# Patient Record
Sex: FEMALE | Race: BLACK OR AFRICAN AMERICAN | Employment: OTHER | URBAN - METROPOLITAN AREA
[De-identification: names, ages, dates, MRNs, and addresses within clinical notes are randomized per-mention and may not be internally consistent; named-entity substitution may affect disease eponyms.]

---

## 2017-01-06 RX ORDER — OMEPRAZOLE 20 MG/1
CAPSULE, DELAYED RELEASE ORAL
Qty: 90 CAP | Refills: 12 | Status: SHIPPED | OUTPATIENT
Start: 2017-01-06 | End: 2019-01-08 | Stop reason: ALTCHOICE

## 2017-01-30 RX ORDER — ATORVASTATIN CALCIUM 20 MG/1
20 TABLET, FILM COATED ORAL DAILY
Qty: 90 TAB | Refills: 3 | Status: SHIPPED | OUTPATIENT
Start: 2017-01-30 | End: 2017-12-18 | Stop reason: SDUPTHER

## 2017-06-06 ENCOUNTER — OFFICE VISIT (OUTPATIENT)
Dept: FAMILY MEDICINE CLINIC | Age: 72
End: 2017-06-06

## 2017-06-06 VITALS
HEART RATE: 62 BPM | SYSTOLIC BLOOD PRESSURE: 132 MMHG | RESPIRATION RATE: 14 BRPM | DIASTOLIC BLOOD PRESSURE: 62 MMHG | BODY MASS INDEX: 20.43 KG/M2 | TEMPERATURE: 96.2 F | HEIGHT: 65 IN | WEIGHT: 122.6 LBS | OXYGEN SATURATION: 100 %

## 2017-06-06 DIAGNOSIS — M79.10 MYALGIA: ICD-10-CM

## 2017-06-06 DIAGNOSIS — E78.00 HYPERCHOLESTEROLEMIA: Primary | ICD-10-CM

## 2017-06-06 DIAGNOSIS — R13.19 ESOPHAGEAL DYSPHAGIA: ICD-10-CM

## 2017-06-06 RX ORDER — DICLOFENAC SODIUM 50 MG/1
TABLET, DELAYED RELEASE ORAL
Qty: 60 TAB | Refills: 5 | Status: SHIPPED | OUTPATIENT
Start: 2017-06-06 | End: 2017-08-25 | Stop reason: CLARIF

## 2017-06-06 NOTE — PROGRESS NOTES
HISTORY OF PRESENT ILLNESS  Lucy Palumbo is a 66 y.o. female. HPI In for cholesterol check. No complaints of chest pain, shortness of breath, TIAs, claudication or edema. Some pain and swelling in knees, Left greater than Right. Thinks that cholesterol pills make pain worse. Takes Tylenol prn- some relief. Has problems with food getting hung up in throat when swallows. Has had this problem for 50 years. Occasional vomiting. No weight loss. ROS    Physical Exam   Constitutional: She is oriented to person, place, and time. She appears well-developed and well-nourished. Neck: No thyromegaly present. Cardiovascular: Normal rate, regular rhythm and normal heart sounds. No murmur heard. Pulmonary/Chest: Effort normal and breath sounds normal. She has no wheezes. BR- without mass   Abdominal: Soft. Bowel sounds are normal. She exhibits no distension. There is no tenderness. There is no rebound and no guarding. Musculoskeletal: Normal range of motion. She exhibits no edema. Knees- moderate crepitus bilaterally   Lymphadenopathy:     She has no cervical adenopathy. Neurological: She is alert and oriented to person, place, and time. Nursing note and vitals reviewed. ASSESSMENT and PLAN  Orders Placed This Encounter    XR SWALLOW FUNC VIDEO    METABOLIC PANEL, COMPREHENSIVE    CBC WITH AUTOMATED DIFF    LIPID PANEL    CK    diclofenac EC (VOLTAREN) 50 mg EC tablet     Glory was seen today for cholesterol problem and gerd. Diagnoses and all orders for this visit:    Hypercholesterolemia  -     METABOLIC PANEL, COMPREHENSIVE  -     LIPID PANEL    Esophageal dysphagia  -     XR SWALLOW FUNC VIDEO; Future  -     CBC WITH AUTOMATED DIFF    Myalgia  -     CK    Other orders  -     diclofenac EC (VOLTAREN) 50 mg EC tablet; One every 12 hours as needed for knee pain      Follow-up Disposition:  Return in about 6 months (around 12/6/2017).

## 2017-06-06 NOTE — PROGRESS NOTES
Chief Complaint   Patient presents with    Cholesterol Problem    GERD     1. Have you been to the ER, urgent care clinic since your last visit? Hospitalized since your last visit? No    2. Have you seen or consulted any other health care providers outside of the [facility] since your last visit? Include any pap smears or colon screening.  No     Health Maintenance Due   Topic Date Due    GLAUCOMA SCREENING Q2Y  07/07/2008    OSTEOPOROSIS SCREENING (DEXA)  07/07/2008

## 2017-06-06 NOTE — MR AVS SNAPSHOT
Visit Information Date & Time Provider Department Dept. Phone Encounter #  
 6/6/2017  8:45 AM Tsering Pimentel MD [facility] [phone] [id number] Follow-up Instructions Return in about 6 months (around 12/6/2017). Upcoming Health Maintenance Date Due  
 GLAUCOMA SCREENING Q2Y 7/7/2008 OSTEOPOROSIS SCREENING (DEXA) 7/7/2008 INFLUENZA AGE 9 TO ADULT 8/1/2017 MEDICARE YEARLY EXAM 12/7/2017 BREAST CANCER SCRN MAMMOGRAM 1/8/2018 DTaP/Tdap/Td series (2 - Td) 1/28/2024 COLONOSCOPY 8/25/2026 Allergies as of 6/6/2017  Review Complete On: 6/6/2017 By: Tsering Pimentel MD  
 No Known Allergies Current Immunizations  Reviewed on 12/1/2015 Name Date Influenza High Dose Vaccine PF 12/6/2016, 12/1/2015, 12/1/2014 Influenza Vaccine PF 11/25/2013 Influenza Vaccine Split 11/30/2012, 10/28/2011, 10/19/2010 Pneumococcal Vaccine (Unspecified Type) 11/30/2012, 10/25/2007 Tdap 1/28/2014 Not reviewed this visit You Were Diagnosed With   
  
 Codes Comments Hypercholesterolemia    -  Primary ICD-10-CM: E78.00 ICD-9-CM: 272.0 Esophageal dysphagia     ICD-10-CM: R13.14 ICD-9-CM: 787.24 Myalgia     ICD-10-CM: M79.1 ICD-9-CM: 729.1 Vitals BP Pulse Temp Resp Height(growth percentile) Weight(growth percentile) 132/62 62 96.2 °F (35.7 °C) (Oral) 14 5' 5\" (1.651 m) 122 lb 9.6 oz (55.6 kg) SpO2 BMI OB Status Smoking Status 100% 20.4 kg/m2 Postmenopausal Current Some Day Smoker Vitals History BMI and BSA Data Body Mass Index Body Surface Area  
 20.4 kg/m 2 1.6 m 2 Preferred Pharmacy Pharmacy Name Phone [facility] [address] [place] [address]  [place] [phone] Your Updated Medication List  
  
   
This list is accurate as of: 6/6/17  9:22 AM.  Always use your most recent med list.  
  
  
  
  
 atorvastatin 20 mg tablet Commonly known as:  LIPITOR Take 1 Tab by mouth daily. For cholesterol  
  
 diclofenac EC 50 mg EC tablet Commonly known as:  VOLTAREN One every 12 hours as needed for knee pain  
  
 omeprazole 20 mg capsule Commonly known as:  PRILOSEC  
TAKE 1 CAPSULE BY MOUTH DAILY FOR INDEGESTION Prescriptions Sent to Pharmacy Refills  
 diclofenac EC (VOLTAREN) 50 mg EC tablet 5 Sig: One every 12 hours as needed for knee pain  
 Class: Normal  
 Pharmacy: [facility] [address] #: [phone] We Performed the Following CBC WITH AUTOMATED DIFF [39731 CPT(R)] CK [id number] CPT(R)] LIPID PANEL [98562 CPT(R)] METABOLIC PANEL, COMPREHENSIVE [09565 CPT(R)] Follow-up Instructions Return in about 6 months (around 12/6/2017). To-Do List   
 06/06/2017 Imaging:  XR SWALLOW FUNC VIDEO   
  
 06/14/2017 2:20 PM  
  Appointment with [facility] SRIDEVI 1 at [facility] ([phone]) Shower or bathe using soap and water. Do not use deodorant, powder, perfumes, or lotion the day of your exam.  If your prior mammograms were not performed at [facility] 6 please bring films with you or forward prior images 2 days before your procedure. Check in at registration 15min before your appointment time unless you were instructed to do otherwise. A script is not necessary, but if you have one, please bring it on the day of the mammogram or have it faxed to the department. [facility] [phone] [facility]  [phone] [facility] [address] [place]  [phone] [facility] [phone] [address] [phone] Introducing [facility]! [facility] introduces [employer] patient portal. Now you can access parts of your medical record, email your doctor's office, and request medication refills online.    
 
1. In your internet browser, go to [email or web address] 2. Click on the First Time User? Click Here link in the Sign In box. You will see the New Member Sign Up page. 3. Enter your [employer] Access Code exactly as it appears below. You will not need to use this code after youve completed the sign-up process. If you do not sign up before the expiration date, you must request a new code. · [employer] Access Code: [email or web address] Expires: 8/9/2017  2:48 PM 
 
4. Enter the last four digits of your Social Security Number (xxxx) and Date of Birth (mm/dd/yyyy) as indicated and click Submit. You will be taken to the next sign-up page. 5. Create a "[employer]"t ID. This will be your [employer] login ID and cannot be changed, so think of one that is secure and easy to remember. 6. Create a [employer] password. You can change your password at any time. 7. Enter your Password Reset Question and Answer. This can be used at a later time if you forget your password. 8. Enter your e-mail address. You will receive e-mail notification when new information is available in [address] 9. Click Sign Up. You can now view and download portions of your medical record. 10. Click the Download Summary menu link to download a portable copy of your medical information. If you have questions, please visit the Frequently Asked Questions section of the [employer] website. Remember, [employer] is NOT to be used for urgent needs. For medical emergencies, dial 911. Now available from your iPhone and Android! Please provide this summary of care documentation to your next provider. Your primary care clinician is listed as Gavin aJramillo. If you have any questions after today's visit, please call [phone].

## 2017-06-07 LAB
ALBUMIN SERPL-MCNC: 4.4 G/DL (ref 3.5–4.8)
ALBUMIN/GLOB SERPL: 1.3 {RATIO} (ref 1.2–2.2)
ALP SERPL-CCNC: 113 IU/L (ref 39–117)
ALT SERPL-CCNC: 15 IU/L (ref 0–32)
AST SERPL-CCNC: 19 IU/L (ref 0–40)
BASOPHILS # BLD AUTO: 0.1 X10E3/UL (ref 0–0.2)
BASOPHILS NFR BLD AUTO: 1 %
BILIRUB SERPL-MCNC: 0.2 MG/DL (ref 0–1.2)
BUN SERPL-MCNC: 10 MG/DL (ref 8–27)
BUN/CREAT SERPL: 11 (ref 12–28)
CALCIUM SERPL-MCNC: 9.5 MG/DL (ref 8.7–10.3)
CHLORIDE SERPL-SCNC: 100 MMOL/L (ref 96–106)
CHOLEST SERPL-MCNC: 161 MG/DL (ref 100–199)
CK SERPL-CCNC: 127 U/L (ref 24–173)
CO2 SERPL-SCNC: 25 MMOL/L (ref 18–29)
CREAT SERPL-MCNC: 0.9 MG/DL (ref 0.57–1)
EOSINOPHIL # BLD AUTO: 0.3 X10E3/UL (ref 0–0.4)
EOSINOPHIL NFR BLD AUTO: 3 %
ERYTHROCYTE [DISTWIDTH] IN BLOOD BY AUTOMATED COUNT: 14.7 % (ref 12.3–15.4)
GLOBULIN SER CALC-MCNC: 3.4 G/DL (ref 1.5–4.5)
GLUCOSE SERPL-MCNC: 79 MG/DL (ref 65–99)
HCT VFR BLD AUTO: 34.3 % (ref 34–46.6)
HDLC SERPL-MCNC: 68 MG/DL
HGB BLD-MCNC: 11.6 G/DL (ref 11.1–15.9)
IMM GRANULOCYTES # BLD: 0 X10E3/UL (ref 0–0.1)
IMM GRANULOCYTES NFR BLD: 0 %
INTERPRETATION, 910389: NORMAL
LDLC SERPL CALC-MCNC: 83 MG/DL (ref 0–99)
LYMPHOCYTES # BLD AUTO: 2.8 X10E3/UL (ref 0.7–3.1)
LYMPHOCYTES NFR BLD AUTO: 33 %
MCH RBC QN AUTO: 31.6 PG (ref 26.6–33)
MCHC RBC AUTO-ENTMCNC: 33.8 G/DL (ref 31.5–35.7)
MCV RBC AUTO: 94 FL (ref 79–97)
MONOCYTES # BLD AUTO: 0.6 X10E3/UL (ref 0.1–0.9)
MONOCYTES NFR BLD AUTO: 8 %
NEUTROPHILS # BLD AUTO: 4.6 X10E3/UL (ref 1.4–7)
NEUTROPHILS NFR BLD AUTO: 55 %
PLATELET # BLD AUTO: 267 X10E3/UL (ref 150–379)
POTASSIUM SERPL-SCNC: 5.1 MMOL/L (ref 3.5–5.2)
PROT SERPL-MCNC: 7.8 G/DL (ref 6–8.5)
RBC # BLD AUTO: 3.67 X10E6/UL (ref 3.77–5.28)
SODIUM SERPL-SCNC: 137 MMOL/L (ref 134–144)
TRIGL SERPL-MCNC: 50 MG/DL (ref 0–149)
VLDLC SERPL CALC-MCNC: 10 MG/DL (ref 5–40)
WBC # BLD AUTO: 8.4 X10E3/UL (ref 3.4–10.8)

## 2017-06-14 ENCOUNTER — HOSPITAL ENCOUNTER (OUTPATIENT)
Dept: MAMMOGRAPHY | Age: 72
Discharge: HOME | End: 2017-06-14
Attending: FAMILY MEDICINE
Payer: MEDICARE

## 2017-06-14 DIAGNOSIS — Z12.31 VISIT FOR SCREENING MAMMOGRAM: ICD-10-CM

## 2017-06-14 PROCEDURE — 77067 SCR MAMMO BI INCL CAD: CPT

## 2017-06-16 ENCOUNTER — HOSPITAL ENCOUNTER (OUTPATIENT)
Dept: GENERAL RADIOLOGY | Age: 72
Discharge: HOME | End: 2017-06-16
Attending: FAMILY MEDICINE
Payer: MEDICARE

## 2017-06-16 DIAGNOSIS — R13.19 ESOPHAGEAL DYSPHAGIA: ICD-10-CM

## 2017-06-16 PROCEDURE — 74230 X-RAY XM SWLNG FUNCJ C+: CPT

## 2017-06-16 PROCEDURE — 92611 MOTION FLUOROSCOPY/SWALLOW: CPT

## 2017-06-16 NOTE — PROGRESS NOTES
[address] [place] [address]    Speech Pathology Modified barium swallow Study  Patient: Aurora Linder (11 y.o. female)  Date: 6/16/2017  Referring Provider:  Dr. Rylee Warrenter:   Patient pleasant, cooperative. Patient reports trouble getting food down. Reports globus sensation while pointing to the level of the collarbone. Reports this has been occurring for many years, but worsened over the past 8 months. Reports this occurs daily. Reports this can occur with any consistency of food, and rarely occurs with liquids. When patient experiences globus sensation, she drinks liquid which alleviates the sensation. Denies coughing/choking with PO intake. Eats regular/thin liquid diet. Denies prior SLP treatment. PMH includes reflux. No neurological or pulmonary issues reported. OBJECTIVE:   Past Medical History:   Past Medical History:   Diagnosis Date    GERD (gastroesophageal reflux disease)     Hypercholesterolemia      Past Surgical History:   Procedure Laterality Date    COLONOSCOPY N/A 8/25/2016    COLONOSCOPY performed by Aneudy Adorno MD at . [facility] LESN,FORCEP/CAUTERY  8/25/2016         Elyssa Barraza  8/25/2016         ENDOSCOPY, COLON, DIAGNOSTIC  6/2006    amanda- nl- but had hx polyp in 2002    HX GYN      vaginal deliveries x3    HI COLONOSCOPY FLX DX W/COLLJ SPEC WHEN PFRMD  6/6/2011         HI ESOPHAGOGASTRODUODENOSCOPY TRANSORAL DIAGNOSTIC  6/6/2011          Current Dietary Status:  Regular/thin  Radiologist: Dr. Pate Spittle: Lateral;Fluoro  Patient Position: standing upright    Trial 1:   Consistency Presented: Thin liquid;Puree; Solid   How Presented: SLP-fed/presented;Spoon;Self-fed/presented;Cup/sip;Cup/gulp       Bolus Acceptance: No impairment   Bolus Formation/Control: No impairment:     Propulsion: No impairment   Oral Residue: None   Initiation of Swallow: Triggered at vallecula;Triggered at pyriform sinus(es)   Timing: No impairment   Penetration: None   Aspiration/Timing: No evidence of aspiration   Pharyngeal Clearance: Vallecular residue;Pyriform residue ; Less than 10%   Attempted Modifications: Double swallow; Alternate liquids/solids   Effective Modifications: Double swallow   Cues for Modifications: None   Comments: Patient with achalasia per radiologist. Also, patient with food in esophagus although patient reported last eating last night       Decreased Tongue Base Retraction?: Yes  Laryngeal Elevation: Inadequate epiglottic inversion; Reduced excursion with laryngeal vestibule gap  Aspiration/Penetration Score: 1 (No penetration or aspiration-Contrast does not enter the airway)  Pharyngeal Symmetry: Not assessed  Pharyngeal-Esophageal Segment: Decreased relaxation of upper esophageal segment; Suspected esophageal dysphagia  Pharyngeal Dysfunction: Crico-pharyngeal dysfunction;Decreased tongue base retraction;Decreased strength    Oral Phase Severity: No impairment  Pharyngeal Phase Severity: Mild    In compliance with CMSs Claims Based Outcome Reporting, the following G-code set was chosen for this patient based the use of the NOMS functional outcome to quantify this patient's level of swallowing impairment. Using the NOMS, the patient was determined to be at level 6 for swallow function which correlates with the CI= 1-19% level of severity. Based on the objective assessment provided within this note, the current, goal, and discharge g-codes are as follows:    Swallow  Swallowing:   Swallow Current Status CI= 1-19%   Swallow Goal Status CI= 1-19%   Swallow D/C Status CI= 1-19%        NOMS Swallowing Levels:  Level 1 (CN): NPO  Level 2 (CM): NPO but takes consistency in therapy  Level 3 (CL): Takes less than 50% of nutrition p.o. and continues with nonoral feedings; and/or safe with mod cues; and/or max diet restriction  Level 4 (CK):  Safe swallow but needs mod cues; and/or mod diet restriction; and/or still requires some nonoral feeding/supplements  Level 5 (CJ): Safe swallow with min diet restriction; and/or needs min cues  Level 6 (CI): Independent with p.o.; rare cues; usually self cues; may need to avoid some foods or needs extra time  Level 7 ([address] [place] [address]): Independent for all p.o.  MORIS. (2003). National Outcomes Measurement System (NOMS): Adult Speech-Language Pathology User's Guide. ASSESSMENT :  Based on the objective data described above, the patient presents with intact oral phase and mild pharyngeal dysphagia. Patient with reduced tongue base retraction, epiglottic inversion, anterior hyoid excursion, and PES opening. This resulted in mild vallecular and pyriform sinus residue. Patient with swallow initiation at the level of the valleculae or pyriform sinus without significant pooling. No penetration or aspiration observed. Per radiologist after esophageal sweep, patient with achalasia. Radiologist also reported that patient with food still in esophagus from last meal, which patient reported was last night. Radiologist reported Barium swallow not necessary given diagnosis has already been made. Patient should follow up with GI MD for treatment for achalasia. PLAN/RECOMMENDATIONS :  No further SLP treatment indicated. Recommendations based on esophageal dysphagia. --Mechanical soft/thin liquid diet  --Alternate liquids/solids  --Upright for 60 minutes after PO intake  --More frequent, smaller meals  --Full glass of water with all medications  --Follow up with GI MD for treatment for achalasia     COMMUNICATION/EDUCATION:   The above findings and recommendations were discussed with: patient who verbalized understanding.     Thank you for this referral.  GIANFRANCO Carlson  Time Calculation: 20 mins

## 2017-06-26 ENCOUNTER — TELEPHONE (OUTPATIENT)
Dept: FAMILY MEDICINE CLINIC | Age: 72
End: 2017-06-26

## 2017-08-28 ENCOUNTER — ANESTHESIA EVENT (OUTPATIENT)
Dept: ENDOSCOPY | Age: 72
End: 2017-08-28
Payer: MEDICARE

## 2017-08-28 ENCOUNTER — HOSPITAL ENCOUNTER (OUTPATIENT)
Age: 72
Setting detail: OUTPATIENT SURGERY
Discharge: HOME | End: 2017-08-28
Attending: INTERNAL MEDICINE | Admitting: INTERNAL MEDICINE
Payer: MEDICARE

## 2017-08-28 ENCOUNTER — ANESTHESIA (OUTPATIENT)
Dept: ENDOSCOPY | Age: 72
End: 2017-08-28
Payer: MEDICARE

## 2017-08-28 VITALS
TEMPERATURE: 97.4 F | DIASTOLIC BLOOD PRESSURE: 88 MMHG | BODY MASS INDEX: 19.66 KG/M2 | OXYGEN SATURATION: 96 % | WEIGHT: 118 LBS | SYSTOLIC BLOOD PRESSURE: 119 MMHG | HEIGHT: 65 IN | RESPIRATION RATE: 19 BRPM | HEART RATE: 63 BPM

## 2017-08-28 PROCEDURE — 76060000031 HC ANESTHESIA FIRST 0.5 HR: Performed by: INTERNAL MEDICINE

## 2017-08-28 PROCEDURE — 74011250636 HC RX REV CODE- 250/636: Performed by: INTERNAL MEDICINE

## 2017-08-28 PROCEDURE — 74011250636 HC RX REV CODE- 250/636

## 2017-08-28 PROCEDURE — C1726 CATH, BAL DIL, NON-VASCULAR: HCPCS | Performed by: INTERNAL MEDICINE

## 2017-08-28 PROCEDURE — 76040000019: Performed by: INTERNAL MEDICINE

## 2017-08-28 PROCEDURE — 74011000250 HC RX REV CODE- 250

## 2017-08-28 PROCEDURE — 77030018712 HC DEV BLLN INFL BSC -B: Performed by: INTERNAL MEDICINE

## 2017-08-28 RX ORDER — DEXTROMETHORPHAN/PSEUDOEPHED 2.5-7.5/.8
1.2 DROPS ORAL
Status: DISCONTINUED | OUTPATIENT
Start: 2017-08-28 | End: 2017-08-28 | Stop reason: HOSPADM

## 2017-08-28 RX ORDER — NALOXONE HYDROCHLORIDE 0.4 MG/ML
0.4 INJECTION, SOLUTION INTRAMUSCULAR; INTRAVENOUS; SUBCUTANEOUS
Status: ACTIVE | OUTPATIENT
Start: 2017-08-28 | End: 2017-08-28

## 2017-08-28 RX ORDER — SODIUM CHLORIDE 9 MG/ML
50 INJECTION, SOLUTION INTRAVENOUS CONTINUOUS
Status: DISPENSED | OUTPATIENT
Start: 2017-08-28 | End: 2017-08-28

## 2017-08-28 RX ORDER — EPINEPHRINE 0.1 MG/ML
1 INJECTION INTRACARDIAC; INTRAVENOUS
Status: DISCONTINUED | OUTPATIENT
Start: 2017-08-28 | End: 2017-08-28 | Stop reason: HOSPADM

## 2017-08-28 RX ORDER — SODIUM CHLORIDE 0.9 % (FLUSH) 0.9 %
5-10 SYRINGE (ML) INJECTION EVERY 8 HOURS
Status: DISCONTINUED | OUTPATIENT
Start: 2017-08-28 | End: 2017-08-28 | Stop reason: HOSPADM

## 2017-08-28 RX ORDER — FLUMAZENIL 0.1 MG/ML
0.2 INJECTION INTRAVENOUS
Status: ACTIVE | OUTPATIENT
Start: 2017-08-28 | End: 2017-08-28

## 2017-08-28 RX ORDER — ATROPINE SULFATE 0.1 MG/ML
0.5 INJECTION INTRAVENOUS
Status: ACTIVE | OUTPATIENT
Start: 2017-08-28 | End: 2017-08-28

## 2017-08-28 RX ORDER — SODIUM CHLORIDE 0.9 % (FLUSH) 0.9 %
5-10 SYRINGE (ML) INJECTION AS NEEDED
Status: DISCONTINUED | OUTPATIENT
Start: 2017-08-28 | End: 2017-08-28 | Stop reason: HOSPADM

## 2017-08-28 RX ORDER — PROPOFOL 10 MG/ML
INJECTION, EMULSION INTRAVENOUS AS NEEDED
Status: DISCONTINUED | OUTPATIENT
Start: 2017-08-28 | End: 2017-08-28 | Stop reason: HOSPADM

## 2017-08-28 RX ORDER — FLUMAZENIL 0.1 MG/ML
0.2 INJECTION INTRAVENOUS
Status: DISCONTINUED | OUTPATIENT
Start: 2017-08-28 | End: 2017-08-28 | Stop reason: HOSPADM

## 2017-08-28 RX ORDER — NALOXONE HYDROCHLORIDE 0.4 MG/ML
0.4 INJECTION, SOLUTION INTRAMUSCULAR; INTRAVENOUS; SUBCUTANEOUS
Status: DISCONTINUED | OUTPATIENT
Start: 2017-08-28 | End: 2017-08-28 | Stop reason: HOSPADM

## 2017-08-28 RX ORDER — SODIUM CHLORIDE 0.9 % (FLUSH) 0.9 %
5-10 SYRINGE (ML) INJECTION AS NEEDED
Status: ACTIVE | OUTPATIENT
Start: 2017-08-28 | End: 2017-08-28

## 2017-08-28 RX ORDER — GLYCOPYRROLATE 0.2 MG/ML
INJECTION INTRAMUSCULAR; INTRAVENOUS AS NEEDED
Status: DISCONTINUED | OUTPATIENT
Start: 2017-08-28 | End: 2017-08-28 | Stop reason: HOSPADM

## 2017-08-28 RX ORDER — ATROPINE SULFATE 0.1 MG/ML
0.5 INJECTION INTRAVENOUS
Status: DISCONTINUED | OUTPATIENT
Start: 2017-08-28 | End: 2017-08-28 | Stop reason: HOSPADM

## 2017-08-28 RX ORDER — EPINEPHRINE 0.1 MG/ML
1 INJECTION INTRACARDIAC; INTRAVENOUS
Status: ACTIVE | OUTPATIENT
Start: 2017-08-28 | End: 2017-08-28

## 2017-08-28 RX ORDER — LIDOCAINE HYDROCHLORIDE 20 MG/ML
INJECTION, SOLUTION EPIDURAL; INFILTRATION; INTRACAUDAL; PERINEURAL AS NEEDED
Status: DISCONTINUED | OUTPATIENT
Start: 2017-08-28 | End: 2017-08-28 | Stop reason: HOSPADM

## 2017-08-28 RX ADMIN — SODIUM CHLORIDE 50 ML/HR: 900 INJECTION, SOLUTION INTRAVENOUS at 09:48

## 2017-08-28 RX ADMIN — LIDOCAINE HYDROCHLORIDE 40 MG: 20 INJECTION, SOLUTION EPIDURAL; INFILTRATION; INTRACAUDAL; PERINEURAL at 10:23

## 2017-08-28 RX ADMIN — GLYCOPYRROLATE 0.1 MG: 0.2 INJECTION INTRAMUSCULAR; INTRAVENOUS at 10:23

## 2017-08-28 RX ADMIN — PROPOFOL 100 MG: 10 INJECTION, EMULSION INTRAVENOUS at 10:29

## 2017-08-28 NOTE — PROGRESS NOTES
Report received from FELIPA Huffman RN with pt upright, supine, with warming blanket on. Pt conversive & coherent.

## 2017-08-28 NOTE — PERIOP NOTES
Unable to get oral or axillary temp. Over 5 warm blankets applied. 94.8 rectal temp taken at 1147. Dr Glenis Valladares notified. Bear hugger ordered and applied. 1245 patient temp 97.4 oral without warm blankets or bear hugger.

## 2017-08-28 NOTE — PERIOP NOTES
The risks and benefits of the bite block have been explained to patient. Patient verbalizes understanding.   Patient has full dentures which will be removed

## 2017-08-28 NOTE — ROUTINE PROCESS
TRANSFER - IN REPORT:    Verbal report received from [facility] RN(name) on Glory Quiros  being received from endo(unit) for ordered procedure      Report consisted of patients Situation, Background, Assessment and   Recommendations(SBAR). Information from the following report(s) SBAR was reviewed with the receiving nurse. Opportunity for questions and clarification was provided. Assessment completed upon patients arrival to unit and care assumed.

## 2017-08-28 NOTE — PERIOP NOTES
Anesthesia reports 100mg Propofol, 40mg Lidocaine and 250mL NS, 0.1 mg robinul given during procedure. Received report from anesthesia staff on vital signs and status of patient.

## 2017-08-28 NOTE — ANESTHESIA PREPROCEDURE EVALUATION
Anesthetic History   No history of anesthetic complications            Review of Systems / Medical History  Patient summary reviewed, nursing notes reviewed and pertinent labs reviewed    Pulmonary          Smoker         Neuro/Psych   Within defined limits           Cardiovascular              Hyperlipidemia    Exercise tolerance: >4 METS     GI/Hepatic/Renal     GERD: poorly controlled           Endo/Other  Within defined limits           Other Findings            Physical Exam    Airway  Mallampati: I  TM Distance: 4 - 6 cm  Neck ROM: normal range of motion   Mouth opening: Normal     Cardiovascular  Regular rate and rhythm,  S1 and S2 normal,  no murmur, click, rub, or gallop  Rhythm: regular  Rate: normal         Dental    Dentition: Full upper dentures and Full lower dentures     Pulmonary  Breath sounds clear to auscultation               Abdominal  GI exam deferred       Other Findings            Anesthetic Plan    ASA: 2  Anesthesia type: total IV anesthesia          Induction: Intravenous  Anesthetic plan and risks discussed with: Patient

## 2017-08-28 NOTE — DISCHARGE INSTRUCTIONS
Cheryl Osgood  [id number]  1945    EGD DISCHARGE INSTRUCTIONS  Discomfort:  Sore throat- throat lozenges or warm salt water gargle  redness at IV site- apply warm compress to area; if redness or soreness persist- contact your physician  Gaseous discomfort- walking, belching will help relieve any discomfort  You may not operate a vehicle for 12 hours  You may not engage in an occupation involving machinery or appliances for rest of today  You may not drink alcoholic beverages for at least 12 hours  Avoid making any critical decisions for at least 24 hour  DIET  You may resume your regular diet    ACTIVITY  Spend the remainder of the day resting -  avoid any strenuous activity. CALL M.D.   ANY SIGN OF   Increasing pain, nausea, vomiting  Abdominal distension (swelling)  New increased bleeding (oral or rectal)  Fever (chills)  Pain in chest area  Bloody discharge from nose or mouth  Shortness of breath    Follow-up Instructions:   Call Israel Danielson  Telephone # [phone]  Brief Findings:   achalasia     DISCHARGE SUMMARY from Nurse    The following personal items collected during your admission are returned to you:   Dental Appliance:    Vision: Visual Aid: None  Hearing Aid:    Jewelry:    Clothing:    Other Valuables:    Valuables sent to safe:

## 2017-08-28 NOTE — PROCEDURES
Endoscopic Gastroduodenoscopy Procedure Note  Rula [place]  1945  [id number]      Procedure: Endoscopic Gastroduodenoscopy with esophageal dilation    Indication:  Dyphagia/odynophagia    Pre-operative Diagnosis: see indication above    Post-operative Diagnosis: see findings below    :  Cristin Tsang MD    Referring Provider:  travis    Anethesia/Sedation:  MAC anesthesia Propofol    Pre-Procedural Exam:      Airway: clear, Malimpati 2   Heart: RRR, without gallops or rubs  Lungs: clear bilaterally without wheezes, crackles, or rhonchi  Abdomen: soft, nontender, nondistended, bowel sounds present        Procedure Details     After infom consent was obtained for the procedure, with all risks and benefits of procedure explained the patient was taken to the endoscopy suite and placed in the left lateral decubitus position. Following sequential administration of sedation as per above, the [id number] gastroscope was inserted into the mouth and advanced under direct vision to second portion of the duodenum. A careful inspection was made as the gastroscope was withdrawn, including a retroflexed view of the proximal stomach; findings and interventions are described below. Findings/Impression:   ESOPHAGUS: The esophagus is normal. The proximal, mid, and distal portions are normal. The Z-Line is intact. Fluid in esophagus removed by suction. Some resistance at GE junction. STOMACH: The fundus on antegrade and retroflex views is normal. The body, antrum, and pylorus are normal.   DUODENUM: The bulb and second portions are normal.      Therapies:  esophageal dilation with 20mm sized balloon    Specimens: none          Complications:   None; patient tolerated the procedure well. EBL:  None. Recommendations:  -esophageal manometry.   Suspect achalasia    Discharge Disposition:

## 2017-08-28 NOTE — H&P
Gastroenterology History and Physical    Patient: [facility]    Physician: Nimo Mayorga MD    Vital Signs: Blood pressure 118/42, pulse 66, temperature 98.2 °F (36.8 °C), resp. rate 18, height 5' 5\" (1.651 m), weight 53.5 kg (118 lb), SpO2 100 %, not currently breastfeeding. Allergies: No Known Allergies    Indication: dysphagia    History:  Past Medical History:   Diagnosis Date    Achalasia     GERD (gastroesophageal reflux disease)     Hypercholesterolemia       Past Surgical History:   Procedure Laterality Date    COLONOSCOPY N/A 8/25/2016    COLONOSCOPY performed by Nmio Mayorga MD at . [facility] LESN,FORCEP/CAUTERY  8/25/2016         Pecolia Bilberry  8/25/2016         ENDOSCOPY, COLON, DIAGNOSTIC  6/2006    amanda- nl- but had hx polyp in 2002    HX GYN      vaginal deliveries x3    [place] COLONOSCOPY FLX DX W/COLLJ SPEC WHEN PFRMD  6/6/2011         [place] ESOPHAGOGASTRODUODENOSCOPY TRANSORAL DIAGNOSTIC  6/6/2011           Social History     Social History    Marital status:      Spouse name: N/A    Number of children: N/A    Years of education: N/A     Social History Main Topics    Smoking status: Current Some Day Smoker     Packs/day: 0.10    Smokeless tobacco: Never Used    Alcohol use Yes      Comment: occ    Drug use: None    Sexual activity: Not Asked     Other Topics Concern    None     Social History Narrative    History reviewed. No pertinent family history. Medications:   Prior to Admission medications    Medication Sig Start Date End Date Taking? Authorizing Provider   atorvastatin (LIPITOR) 20 mg tablet Take 1 Tab by mouth daily. For cholesterol 1/30/17   Jason Tolentino MD   omeprazole (PRILOSEC) 20 mg capsule TAKE 1 CAPSULE BY MOUTH DAILY FOR INDEGESTION 1/6/17   Jason Tolentino MD       Physical Exam:   HEENT: Head: Normocephalic, no lesions, without obvious abnormality.    Heart: regular rate and rhythm, S1, S2 normal, no murmur, click, rub or gallop   Lungs: chest clear, no wheezing, rales, normal symmetric air entry, Heart exam - S1, S2 normal, no murmur, no gallop, rate regular   Abdominal: Bowel sounds are normal, liver is not enlarged, spleen is not enlarged     Signed:  Deon Garcia MD 8/28/2017

## 2017-08-28 NOTE — IP AVS SNAPSHOT
[address] [facility] 
[phone] Patient: Dread Almeida MRN: [id number] AUW:2/0/[birth year]43 You are allergic to the following No active allergies Recent Documentation Height Weight Breastfeeding? BMI OB Status Smoking Status 1.651 m 53.5 kg No 19.64 kg/m2 Postmenopausal Current Some Day Smoker Emergency Contacts Name Discharge Info Relation Home Work Mobile Jamari Quiros DISCHARGE CAREGIVER [3] Spouse [3] [phone] About your hospitalization You were admitted on:  August 28, 2017 You last received care in the:  [facility] You were discharged on:  August 28, 2017 Unit phone number:  [phone] Why you were hospitalized Your primary diagnosis was:  Not on File Providers Seen During Your Hospitalizations Provider Role Specialty Primary office phone Norah Mendieta MD Attending Provider Gastroenterology [phone] Your Primary Care Physician (PCP) Primary Care Physician Office Phone Office Fax [facility] [phone] Follow-up Information None Your Appointments Tuesday September 12, 2017 ESOPHAGEAL MANOMETRY with Norah Mendieta MD  
[facility] ([place] OR PRE ASSESSMENT) [facility]  
[phone] Current Discharge Medication List  
  
CONTINUE these medications which have NOT CHANGED Dose & Instructions Dispensing Information Comments Morning Noon Evening Bedtime  
 atorvastatin 20 mg tablet Commonly known as:  LIPITOR Your last dose was: Your next dose is:    
   
   
 Dose:  20 mg Take 1 Tab by mouth daily. For cholesterol Quantity:  90 Tab Refills:  3  
     
   
   
   
  
 omeprazole 20 mg capsule Commonly known as:  PRILOSEC Your last dose was: Your next dose is: TAKE 1 CAPSULE BY MOUTH DAILY FOR INDEGESTION Quantity:  90 Cap Refills:  12 **Patient requests 90 days supply** Discharge Instructions Yifan Chaudhry [id number] 
1945 EGD DISCHARGE INSTRUCTIONS Discomfort: 
Sore throat- throat lozenges or warm salt water gargle 
redness at IV site- apply warm compress to area; if redness or soreness persist- contact your physician Gaseous discomfort- walking, belching will help relieve any discomfort You may not operate a vehicle for 12 hours You may not engage in an occupation involving machinery or appliances for rest of today You may not drink alcoholic beverages for at least 12 hours Avoid making any critical decisions for at least 24 hour DIET You may resume your regular diet ACTIVITY Spend the remainder of the day resting -  avoid any strenuous activity. CALL M.D. ANY SIGN OF Increasing pain, nausea, vomiting Abdominal distension (swelling) New increased bleeding (oral or rectal) Fever (chills) Pain in chest area Bloody discharge from nose or mouth Shortness of breath Follow-up Instructions: 
 Call Yaquelin Prather Telephone # [phone] Brief Findings:   achalasia DISCHARGE SUMMARY from Nurse The following personal items collected during your admission are returned to you:  
Dental Appliance:   
Vision: Visual Aid: None Hearing Aid:   
Jewelry:   
Clothing:   
Other Valuables:   
Valuables sent to safe:   
 
Discharge Orders None Introducing [facility] & HEALTH SERVICES! Galen Rodriguez introduces TripTouch patient portal. Now you can access parts of your medical record, email your doctor's office, and request medication refills online. 1. In your internet browser, go to [email or web address] [email or web address]/BVfon Telecommunication 2. Click on the First Time User? Click Here link in the Sign In box. You will see the New Member Sign Up page. 3. Enter your TripTouch Access Code exactly as it appears below.  You will not need to use this code after youve completed the sign-up process. If you do not sign up before the expiration date, you must request a new code. · Xockets Access Code: [email or web address] Expires: 11/22/2017  4:40 PM 
 
4. Enter the last four digits of your Social Security Number (xxxx) and Date of Birth (mm/dd/yyyy) as indicated and click Submit. You will be taken to the next sign-up page. 5. Create a Xockets ID. This will be your Xockets login ID and cannot be changed, so think of one that is secure and easy to remember. 6. Create a Xockets password. You can change your password at any time. 7. Enter your Password Reset Question and Answer. This can be used at a later time if you forget your password. 8. Enter your e-mail address. You will receive e-mail notification when new information is available in [address] 9. Click Sign Up. You can now view and download portions of your medical record. 10. Click the Download Summary menu link to download a portable copy of your medical information. If you have questions, please visit the Frequently Asked Questions section of the Xockets website. Remember, Xockets is NOT to be used for urgent needs. For medical emergencies, dial 911. Now available from your iPhone and Android! General Information Please provide this summary of care documentation to your next provider. Patient Signature:  ____________________________________________________________ Date:  ____________________________________________________________  
  
Gwendloyn Finger Provider Signature:  ____________________________________________________________ Date:  ____________________________________________________________

## 2017-08-28 NOTE — ANESTHESIA POSTPROCEDURE EVALUATION
Post-Anesthesia Evaluation and Assessment    Patient: Lynn Altamirano MRN: [id number]  SSN: [id number]    YOB: 1945  Age: 74 y.o. Sex: female       Cardiovascular Function/Vital Signs  Visit Vitals    /53    Pulse 74    Temp 36.8 °C (98.2 °F)    Resp 15    Ht 5' 5\" (1.651 m)    Wt 53.5 kg (118 lb)    SpO2 100%    Breastfeeding No    BMI 19.64 kg/m2       Patient is status post total IV anesthesia anesthesia for Procedure(s):  ESOPHAGOGASTRODUODENOSCOPY (EGD)  ESOPHAGEAL DILATION. Nausea/Vomiting: None    Postoperative hydration reviewed and adequate. Pain:  Pain Scale 1: Numeric (0 - 10) (08/28/17 1039)  Pain Intensity 1: 0 (08/28/17 1039)   Managed    Neurological Status: At baseline    Mental Status and Level of Consciousness: Arousable    Pulmonary Status:   O2 Device: Room air (08/28/17 1039)   Adequate oxygenation and airway patent    Complications related to anesthesia: None    Post-anesthesia assessment completed.  No concerns    Signed By: Qiana Beasley MD     August 28, 2017

## 2017-09-12 ENCOUNTER — HOSPITAL ENCOUNTER (OUTPATIENT)
Age: 72
Setting detail: OUTPATIENT SURGERY
Discharge: HOME | End: 2017-09-12
Attending: INTERNAL MEDICINE | Admitting: INTERNAL MEDICINE
Payer: MEDICARE

## 2017-09-12 VITALS
OXYGEN SATURATION: 100 % | HEIGHT: 65 IN | DIASTOLIC BLOOD PRESSURE: 64 MMHG | SYSTOLIC BLOOD PRESSURE: 131 MMHG | BODY MASS INDEX: 19.66 KG/M2 | WEIGHT: 118 LBS | HEART RATE: 52 BPM | RESPIRATION RATE: 16 BRPM

## 2017-09-12 PROCEDURE — 76040000007: Performed by: INTERNAL MEDICINE

## 2017-09-12 PROCEDURE — 74011000250 HC RX REV CODE- 250: Performed by: INTERNAL MEDICINE

## 2017-09-12 RX ORDER — LIDOCAINE HYDROCHLORIDE 20 MG/ML
JELLY TOPICAL ONCE
Status: COMPLETED | OUTPATIENT
Start: 2017-09-12 | End: 2017-09-12

## 2017-09-12 RX ADMIN — LIDOCAINE HYDROCHLORIDE 5 MG: 20 JELLY TOPICAL at 10:13

## 2017-09-12 NOTE — IP AVS SNAPSHOT
[address] [facility] 
[phone] Patient: Marcela Bosch MRN: [id number] ECM:6/7/0984 You are allergic to the following No active allergies Recent Documentation Height Weight Breastfeeding? BMI OB Status Smoking Status 1.651 m 53.5 kg No 19.64 kg/m2 Postmenopausal Current Some Day Smoker Emergency Contacts Name Discharge Info Relation Home Work Mobile Jamari Quiros DISCHARGE CAREGIVER [3] Spouse [3] [phone] About your hospitalization You were admitted on:  September 12, 2017 You last received care in the:  [facility] ENDOSCOPY You were discharged on:  September 12, 2017 Unit phone number:  [phone] Why you were hospitalized Your primary diagnosis was:  Not on File Providers Seen During Your Hospitalizations Provider Role Specialty Primary office phone Archana Boland MD Attending Provider Gastroenterology [phone] Your Primary Care Physician (PCP) Primary Care Physician Office Phone Office Fax Shira Del Rosario [phone] Follow-up Information None Current Discharge Medication List  
  
ASK your doctor about these medications Dose & Instructions Dispensing Information Comments Morning Noon Evening Bedtime  
 atorvastatin 20 mg tablet Commonly known as:  LIPITOR Your last dose was: Your next dose is:    
   
   
 Dose:  20 mg Take 1 Tab by mouth daily. For cholesterol Quantity:  90 Tab Refills:  3  
     
   
   
   
  
 omeprazole 20 mg capsule Commonly known as:  PRILOSEC Your last dose was: Your next dose is: TAKE 1 CAPSULE BY MOUTH DAILY FOR INDEGESTION Quantity:  90 Cap Refills:  12 **Patient requests 90 days supply** Discharge Instructions Marcela Bosch [id number] 
1945 MANOMETRY DISCHARGE INSTRUCTION You may resume your regular diet as tolerated. You may resume your normal daily activities. If you develop a sore throat- throat lozenges or warm salt water gargles will help. Call your Physician if you have any complications or questions. HESIODO Activation Thank you for requesting access to HESIODO. Please follow the instructions below to securely access and download your online medical record. HESIODO allows you to send messages to your doctor, view your test results, renew your prescriptions, schedule appointments, and more. How Do I Sign Up? 1. In your internet browser, go to [email or web address].[employer] (TUGG) 
2. Click on the First Time User? Click Here link in the Sign In box. You will be redirect to the New Member Sign Up page. 3. Enter your HESIODO Access Code exactly as it appears below. You will not need to use this code after youve completed the sign-up process. If you do not sign up before the expiration date, you must request a new code. HESIODO Access Code: [email or web address] Expires: 2017  4:40 PM (This is the date your HESIODO access code will ) 4. Enter the last four digits of your Social Security Number (xxxx) and Date of Birth (mm/dd/yyyy) as indicated and click Submit. You will be taken to the next sign-up page. 5. Create a HESIODO ID. This will be your HESIODO login ID and cannot be changed, so think of one that is secure and easy to remember. 6. Create a HESIODO password. You can change your password at any time. 7. Enter your Password Reset Question and Answer. This can be used at a later time if you forget your password. 8. Enter your e-mail address. You will receive e-mail notification when new information is available in [address] 9. Click Sign Up. You can now view and download portions of your medical record. 10. Click the Download Summary menu link to download a portable copy of your medical information. Additional Information If you have questions, please visit the Frequently Asked Questions section of the [email or web address] website at [email or web address] Remember, Pure life renalt is NOT to be used for urgent needs. For medical emergencies, dial 911. Discharge Orders None Introducing hospitals SERVICES! Kaylyn Valdez introduces emotion.me patient portal. Now you can access parts of your medical record, email your doctor's office, and request medication refills online. 1. In your internet browser, go to [email or web address]://[employer] Holdings. Redeemia/Adwo [employer] Holdings 2. Click on the First Time User? Click Here link in the Sign In box. You will see the New Member Sign Up page. 3. Enter your emotion.me Access Code exactly as it appears below. You will not need to use this code after youve completed the sign-up process. If you do not sign up before the expiration date, you must request a new code. · [email or web address] Access Code: [email or web address] Expires: 11/22/2017  4:40 PM 
 
4. Enter the last four digits of your Social Security Number (xxxx) and Date of Birth (mm/dd/yyyy) as indicated and click Submit. You will be taken to the next sign-up page. 5. Create a emotion.me ID. This will be your emotion.me login ID and cannot be changed, so think of one that is secure and easy to remember. 6. Create a emotion.me password. You can change your password at any time. 7. Enter your Password Reset Question and Answer. This can be used at a later time if you forget your password. 8. Enter your e-mail address. You will receive e-mail notification when new information is available in [address] 9. Click Sign Up. You can now view and download portions of your medical record. 10. Click the Download Summary menu link to download a portable copy of your medical information. If you have questions, please visit the Frequently Asked Questions section of the emotion.me website.  Remember, emotion.me is NOT to be used for urgent needs. For medical emergencies, dial 911. Now available from your iPhone and Android! General Information Please provide this summary of care documentation to your next provider. Patient Signature:  ____________________________________________________________ Date:  ____________________________________________________________  
  
Janett Oka Provider Signature:  ____________________________________________________________ Date:  ____________________________________________________________

## 2017-09-14 NOTE — OP NOTES
[address] [address] [address]   OP NOTE       Name:  Juan Griggs   MR#:  [id number]   :  1945   Account #:  [de-identified]    Surgery Date:  2017   Date of Adm:  2017       PREOPERATIVE DIAGNOS IS:      POSTOPERATIVE DIAGNOS IS:      PROCEDURES PERFORMED:  Esophageal manometry with   impedance. ESTIMATED BLOOD LOSS: 0     SPECIMENS REMOVED: 0     ANESTHESIA:  0     Study done 2017 and read 2017. INDICATIONS: Dysphagia. Lower esophageal sphincter basal pressure was normal at 33.7   (normal 13-43). The LES residual pressure was elevated to 21.9   (normal less than 15). The upper esophageal sphincter was normal   with a basal pressure of 34.7 (normal ) and a residual pressure   of -1.3 (normal less than 12). Peristalsis was not successful, 90% of   contractions completely failed and 10% were simultaneous, giving an   incomplete bolus contraction 100%. IMPRESSION: Classic achalasia.              MD Evie Yin   D:  2017   09:08   T:  2017   11:48   Job #:  [id number]

## 2017-10-06 ENCOUNTER — OFFICE VISIT (OUTPATIENT)
Dept: SURGERY | Age: 72
End: 2017-10-06

## 2017-10-06 VITALS
HEIGHT: 65 IN | TEMPERATURE: 98.3 F | DIASTOLIC BLOOD PRESSURE: 66 MMHG | RESPIRATION RATE: 18 BRPM | OXYGEN SATURATION: 98 % | SYSTOLIC BLOOD PRESSURE: 133 MMHG | HEART RATE: 65 BPM | WEIGHT: 119.5 LBS | BODY MASS INDEX: 19.91 KG/M2

## 2017-10-06 DIAGNOSIS — K44.9 HIATAL HERNIA: ICD-10-CM

## 2017-10-06 DIAGNOSIS — K22.0 ACHALASIA: Primary | ICD-10-CM

## 2017-10-06 NOTE — PROGRESS NOTES
Surgery Consult:  Achalasia  Requesting physician:  Dr. Joon Chowdhury    Subjective:   Patient 74 y.o.  female presents with 50 year history of solid dysphagia. Patient reports that within the past 6-8 months, symptoms have been getting worse. Now, patient has problems with liquid at times. Patient lost about 5 lbs over the past few months. Denies any nausea or vomiting. Patient complains of intermittent post-prandial epigastric pain especially with meat. No change in bowel habits. No diarrhea or constipation. No history of chronic cough, aspiration pneumonia, or SOB. Denies any GERD symptoms. Patient underwent swallow study on 6/16/17 and it showed dilated esophagus with retained material and concentric narrowing at the GE junction consistent with achalasia. Patient underwent EGD with dilation on 8/28/17 and noted some resistance at the GE junction. Patient reports some improvement with dilation. Patient also had esophageal manometry on 9/12/17 and it showed findings consistent with achalasia. In reviewing [facility] care, patient noted to have CT scan on 12/2/13 which was notable for hiatal hernia.       Past Medical & Surgical History:  Past Medical History:   Diagnosis Date    Achalasia     GERD (gastroesophageal reflux disease)     Hypercholesterolemia       Past Surgical History:   Procedure Laterality Date    COLONOSCOPY N/A 8/25/2016    COLONOSCOPY performed by Joyce Guzman MD at . [facility] LESN,FORCEP/CAUTERY  8/25/2016         Alex De La Rosa  8/25/2016         ENDOSCOPY, COLON, DIAGNOSTIC  6/2006    amanda- nl- but had hx polyp in 2002    HX GYN      vaginal deliveries x3    [place] COLONOSCOPY FLX DX W/COLLJ SPEC WHEN PFRMD  6/6/2011         [place] ESOPHAGOGASTRODUODENOSCOPY TRANSORAL DIAGNOSTIC  6/6/2011         UPPER GI ENDOSCOPY,BALL DIL,30MM  8/28/2017            Social History:  Social History     Social History    Marital status:  Spouse name: N/A    Number of children: N/A    Years of education: N/A     Occupational History    Not on file. Social History Main Topics    Smoking status: Current Some Day Smoker     Packs/day: 0.10    Smokeless tobacco: Never Used    Alcohol use Yes      Comment: occ    Drug use: Not on file    Sexual activity: Not on file     Other Topics Concern    Not on file     Social History Narrative        Family History:  Family History   Problem Relation Age of Onset    Heart Disease Father     Cancer Sister      cervical Cancer    Cancer Brother      Stomach Cancer        Medications:  Current Outpatient Prescriptions   Medication Sig    atorvastatin (LIPITOR) 20 mg tablet Take 1 Tab by mouth daily. For cholesterol    omeprazole (PRILOSEC) 20 mg capsule TAKE 1 CAPSULE BY MOUTH DAILY FOR INDEGESTION     No current facility-administered medications for this visit. Allergies:  No Known Allergies    Review of Systems  A comprehensive review of systems was negative except for that written in the HPI. Objective:     Exam:    Visit Vitals    /66    Pulse 65    Temp 98.3 °F (36.8 °C) (Oral)    Resp 18    Ht 5' 5\" (1.651 m)    Wt 54.2 kg (119 lb 8 oz)    SpO2 98%    BMI 19.89 kg/m2     General appearance: alert, cooperative, no distress, appears stated age  Eyes: negative  Lungs: clear to auscultation bilaterally  Heart: regular rate and rhythm  Abdomen: soft, non-tender. Non-distended. Extremities: extremities normal, atraumatic, no cyanosis or edema. ESTER. Skin: Skin color, texture, turgor normal. No rashes or lesions. Neurologic: Grossly normal      Assessment:     Achalasia  Hiatal hernia    Plan:     Hosie Counts myotomy with Ramo fundoplication and hiatal hernia repair  Risks, benefit, and alternative to surgery was discussed with the patient.   The risks of surgery include but not limited to infection, bleeding, intraabdominal organ injury, vagus nerve injury, recurrence, dysphagia, GERD symptoms, possible conversion to open, and the risks of general anesthetic. Patient wants to think about it and get back to us. All questions answered.

## 2017-10-06 NOTE — MR AVS SNAPSHOT
Visit Information Date & Time Provider Department Dept. Phone Encounter #  
 10/6/2017  2:40 PM Cj Trinidad MD [facility] [place] [address] [id number] Your Appointments 12/11/2017  8:45 AM  
ROUTINE CARE with José Luis Alexander MD  
[facility]) Appt Note: routine follow up  
 [address] [id number] [phone] [address] [place] [address] [address] Upcoming Health Maintenance Date Due  
 GLAUCOMA SCREENING Q2Y 7/7/2008 OSTEOPOROSIS SCREENING (DEXA) 7/7/2008 INFLUENZA AGE 9 TO ADULT 8/1/2017 MEDICARE YEARLY EXAM 12/7/2017 DTaP/Tdap/Td series (2 - Td) 1/28/2024 COLONOSCOPY 8/25/2026 Allergies as of 10/6/2017  Review Complete On: 10/6/2017 By: Cj Trinidad MD  
 No Known Allergies Current Immunizations  Reviewed on 12/1/2015 Name Date Influenza High Dose Vaccine PF 12/6/2016, 12/1/2015, 12/1/2014 Influenza Vaccine PF 11/25/2013 Influenza Vaccine Split 11/30/2012, 10/28/2011, 10/19/2010 Tdap 1/28/2014 ZZZ-RETIRED (DO NOT USE) Pneumococcal Vaccine (Unspecified Type) 11/30/2012, 10/25/2007 Not reviewed this visit You Were Diagnosed With   
  
 Codes Comments Achalasia    -  Primary ICD-10-CM: K22.0 ICD-9-CM: 530.0 Hiatal hernia     ICD-10-CM: K44.9 ICD-9-CM: 731. 3 Vitals BP Pulse Temp Resp Height(growth percentile) Weight(growth percentile) 133/66 65 98.3 °F (36.8 °C) (Oral) 18 5' 5\" (1.651 m) 119 lb 8 oz (54.2 kg) SpO2 BMI OB Status Smoking Status 98% 19.89 kg/m2 Postmenopausal Current Some Day Smoker BMI and BSA Data Body Mass Index Body Surface Area  
 19.89 kg/m 2 1.58 m 2 Preferred Pharmacy Pharmacy Name Phone [facility] [address]  [place] [phone] Your Updated Medication List  
  
   
This list is accurate as of: 10/6/17  4:46 PM.  Always use your most recent med list.  
  
  
  
  
 atorvastatin 20 mg tablet Commonly known as:  LIPITOR Take 1 Tab by mouth daily. For cholesterol  
  
 omeprazole 20 mg capsule Commonly known as:  PRILOSEC  
TAKE 1 CAPSULE BY MOUTH DAILY FOR INDEGESTION Introducing [facility] SERVICES! José Angel introduces 121cast patient portal. Now you can access parts of your medical record, email your doctor's office, and request medication refills online. 1. In your internet browser, go to [email or web address]. [email or web address]/NephroPlus 2. Click on the First Time User? Click Here link in the Sign In box. You will see the New Member Sign Up page. 3. Enter your 121cast Access Code exactly as it appears below. You will not need to use this code after youve completed the sign-up process. If you do not sign up before the expiration date, you must request a new code. · 121cast Access Code: [email or web address] Expires: 11/22/2017  4:40 PM 
 
4. Enter the last four digits of your Social Security Number (xxxx) and Date of Birth (mm/dd/yyyy) as indicated and click Submit. You will be taken to the next sign-up page. 5. Create a 121cast ID. This will be your 121cast login ID and cannot be changed, so think of one that is secure and easy to remember. 6. Create a 121cast password. You can change your password at any time. 7. Enter your Password Reset Question and Answer. This can be used at a later time if you forget your password. 8. Enter your e-mail address. You will receive e-mail notification when new information is available in [address] 9. Click Sign Up. You can now view and download portions of your medical record. 10. Click the Download Summary menu link to download a portable copy of your medical information.  
 
If you have questions, please visit the Frequently Asked Questions section of the DNS:Net. Remember, Cephasonicshart is NOT to be used for urgent needs. For medical emergencies, dial 911. Now available from your iPhone and Android! Please provide this summary of care documentation to your next provider. Your primary care clinician is listed as Jose Hazel. If you have any questions after today's visit, please call [phone].

## 2017-10-09 ENCOUNTER — CLINICAL SUPPORT (OUTPATIENT)
Dept: FAMILY MEDICINE CLINIC | Age: 72
End: 2017-10-09

## 2017-10-09 DIAGNOSIS — Z23 ENCOUNTER FOR IMMUNIZATION: Primary | ICD-10-CM

## 2017-10-09 NOTE — PATIENT INSTRUCTIONS
Vaccine Information Statement    Influenza (Flu) Vaccine (Inactivated or Recombinant): What you need to know    Many Vaccine Information Statements are available in Malay and other languages. See www.immunize.org/vis  Hojas de Información Sobre Vacunas están disponibles en Español y en muchos otros idiomas. Visite www.immunize.org/vis    1. Why get vaccinated? Influenza (flu) is a contagious disease that spreads around the [place] every year, usually between October and May. Flu is caused by influenza viruses, and is spread mainly by coughing, sneezing, and close contact. Anyone can get flu. Flu strikes suddenly and can last several days. Symptoms vary by age, but can include:   fever/chills   sore throat   muscle aches   fatigue   cough   headache    runny or stuffy nose    Flu can also lead to pneumonia and blood infections, and cause diarrhea and seizures in children. If you have a medical condition, such as heart or lung disease, flu can make it worse. Flu is more dangerous for some people. Infants and young children, people 72years of age and older, pregnant women, and people with certain health conditions or a weakened immune system are at greatest risk. Each year thousands of people in the [facility] die from flu, and many more are hospitalized. Flu vaccine can:   keep you from getting flu,   make flu less severe if you do get it, and   keep you from spreading flu to your family and other people. 2. Inactivated and recombinant flu vaccines    A dose of flu vaccine is recommended every flu season. Children 6 months through 6years of age may need two doses during the same flu season. Everyone else needs only one dose each flu season.        Some inactivated flu vaccines contain a very small amount of a mercury-based preservative called thimerosal. Studies have not shown thimerosal in vaccines to be harmful, but flu vaccines that do not contain thimerosal are available. There is no live flu virus in flu shots. They cannot cause the flu. There are many flu viruses, and they are always changing. Each year a new flu vaccine is made to protect against three or four viruses that are likely to cause disease in the upcoming flu season. But even when the vaccine doesnt exactly match these viruses, it may still provide some protection    Flu vaccine cannot prevent:   flu that is caused by a virus not covered by the vaccine, or   illnesses that look like flu but are not. It takes about 2 weeks for protection to develop after vaccination, and protection lasts through the flu season. 3. Some people should not get this vaccine    Tell the person who is giving you the vaccine:     If you have any severe, life-threatening allergies. If you ever had a life-threatening allergic reaction after a dose of flu vaccine, or have a severe allergy to any part of this vaccine, you may be advised not to get vaccinated. Most, but not all, types of flu vaccine contain a small amount of egg protein.  If you ever had Guillain-Barré Syndrome (also called GBS). Some people with a history of GBS should not get this vaccine. This should be discussed with your doctor.  If you are not feeling well. It is usually okay to get flu vaccine when you have a mild illness, but you might be asked to come back when you feel better. 4. Risks of a vaccine reaction    With any medicine, including vaccines, there is a chance of reactions. These are usually mild and go away on their own, but serious reactions are also possible. Most people who get a flu shot do not have any problems with it.      Minor problems following a flu shot include:    soreness, redness, or swelling where the shot was given     hoarseness   sore, red or itchy eyes   cough   fever   aches   headache   itching   fatigue  If these problems occur, they usually begin soon after the shot and last 1 or 2 days. More serious problems following a flu shot can include the following:     There may be a small increased risk of Guillain-Barré Syndrome (GBS) after inactivated flu vaccine. This risk has been estimated at 1 or 2 additional cases per million people vaccinated. This is much lower than the risk of severe complications from flu, which can be prevented by flu vaccine.  Young children who get the flu shot along with pneumococcal vaccine (PCV13) and/or DTaP vaccine at the same time might be slightly more likely to have a seizure caused by fever. Ask your doctor for more information. Tell your doctor if a child who is getting flu vaccine has ever had a seizure. Problems that could happen after any injected vaccine:      People sometimes faint after a medical procedure, including vaccination. Sitting or lying down for about 15 minutes can help prevent fainting, and injuries caused by a fall. Tell your doctor if you feel dizzy, or have vision changes or ringing in the ears.  Some people get severe pain in the shoulder and have difficulty moving the arm where a shot was given. This happens very rarely.  Any medication can cause a severe allergic reaction. Such reactions from a vaccine are very rare, estimated at about 1 in a million doses, and would happen within a few minutes to a few hours after the vaccination. As with any medicine, there is a very remote chance of a vaccine causing a serious injury or death. The safety of vaccines is always being monitored. For more information, visit: www.cdc.gov/vaccinesafety/    5. What if there is a serious reaction? What should I look for?  Look for anything that concerns you, such as signs of a severe allergic reaction, very high fever, or unusual behavior.     Signs of a severe allergic reaction can include hives, swelling of the face and throat, difficulty breathing, a fast heartbeat, dizziness, and weakness - usually within a few minutes to a few hours after the vaccination. What should I do?  If you think it is a severe allergic reaction or other emergency that cant wait, call 9-1-1 and get the person to the nearest hospital. Otherwise, call your doctor.  Reactions should be reported to the Vaccine Adverse Event Reporting System (VAERS). Your doctor should file this report, or you can do it yourself through  the VAERS web site at www.vaers. [facility].gov, or by calling 6-451.992.3716. VAERS does not give medical advice. 6. The National Vaccine Injury Compensation Program    The [facility] Vaccine Injury Compensation Program (VICP) is a federal program that was created to compensate people who may have been injured by certain vaccines. Persons who believe they may have been injured by a vaccine can learn about the program and about filing a claim by calling 5-407.206.9697 or visiting the [place] website at www.[facility].gov/vaccinecompensation. There is a time limit to file a claim for compensation. 7. How can I learn more?  Ask your healthcare provider. He or she can give you the vaccine package insert or suggest other sources of information.  Call your local or state health department.  Contact the Centers for Disease Control and Prevention (CDC):  - Call 4-942.795.6943 (1-800-CDC-INFO) or  - Visit CDCs website at www.cdc.gov/flu    Vaccine Information Statement   Inactivated Influenza Vaccine   8/7/2015  [facility] [id number]    Department of Health and Human Services  Centers for Disease Control and Prevention    Office Use Only

## 2017-12-11 ENCOUNTER — OFFICE VISIT (OUTPATIENT)
Dept: FAMILY MEDICINE CLINIC | Age: 72
End: 2017-12-11

## 2017-12-11 VITALS
WEIGHT: 114.8 LBS | RESPIRATION RATE: 14 BRPM | HEART RATE: 65 BPM | DIASTOLIC BLOOD PRESSURE: 51 MMHG | BODY MASS INDEX: 19.13 KG/M2 | SYSTOLIC BLOOD PRESSURE: 94 MMHG | TEMPERATURE: 96.4 F | HEIGHT: 65 IN

## 2017-12-11 DIAGNOSIS — E78.00 HYPERCHOLESTEROLEMIA: Primary | ICD-10-CM

## 2017-12-11 DIAGNOSIS — J40 BRONCHITIS: ICD-10-CM

## 2017-12-11 DIAGNOSIS — M25.531 RIGHT WRIST PAIN: ICD-10-CM

## 2017-12-11 RX ORDER — CHOLECALCIFEROL (VITAMIN D3) 125 MCG
CAPSULE ORAL
COMMUNITY
Start: 2017-12-11 | End: 2019-01-08 | Stop reason: ALTCHOICE

## 2017-12-11 RX ORDER — AMOXICILLIN 500 MG/1
500 CAPSULE ORAL 3 TIMES DAILY
Qty: 30 CAP | Refills: 0 | Status: SHIPPED | OUTPATIENT
Start: 2017-12-11 | End: 2017-12-21

## 2017-12-11 RX ORDER — PROMETHAZINE HYDROCHLORIDE AND DEXTROMETHORPHAN HYDROBROMIDE 6.25; 15 MG/5ML; MG/5ML
5 SYRUP ORAL
Qty: 240 ML | Refills: 2 | Status: SHIPPED | OUTPATIENT
Start: 2017-12-11 | End: 2019-01-08 | Stop reason: ALTCHOICE

## 2017-12-11 NOTE — MR AVS SNAPSHOT
Visit Information Date & Time Provider Department Dept. Phone Encounter #  
 12/11/2017  8:45 AM Nataliya Espinoza MD [facility] [phone] [id number] Upcoming Health Maintenance Date Due  
 GLAUCOMA SCREENING Q2Y 7/7/2008 OSTEOPOROSIS SCREENING (DEXA) 7/7/2008 MEDICARE YEARLY EXAM 12/7/2017 DTaP/Tdap/Td series (2 - Td) 1/28/2024 COLONOSCOPY 8/25/2026 Allergies as of 12/11/2017  Review Complete On: 12/11/2017 By: Veronica Mendez LPN No Known Allergies Current Immunizations  Reviewed on 12/1/2015 Name Date Influenza High Dose Vaccine PF 10/9/2017, 12/6/2016, 12/1/2015, 12/1/2014 Influenza Vaccine PF 11/25/2013 Influenza Vaccine Split 11/30/2012, 10/28/2011, 10/19/2010 Tdap 1/28/2014 ZZZ-RETIRED (DO NOT USE) Pneumococcal Vaccine (Unspecified Type) 11/30/2012, 10/25/2007 Not reviewed this visit You Were Diagnosed With   
  
 Codes Comments Hypercholesterolemia    -  Primary ICD-10-CM: E78.00 ICD-9-CM: 272.0 Right wrist pain     ICD-10-CM: M25.531 ICD-9-CM: 719.43 Bronchitis     ICD-10-CM: J40 ICD-9-CM: 722 Vitals BP Pulse Temp Resp Height(growth percentile) Weight(growth percentile) 94/51 65 96.4 °F (35.8 °C) (Oral) 14 5' 5\" (1.651 m) 114 lb 12.8 oz (52.1 kg) BMI OB Status Smoking Status 19.1 kg/m2 Postmenopausal Current Some Day Smoker Vitals History BMI and BSA Data Body Mass Index Body Surface Area  
 19.1 kg/m 2 1.55 m 2 Preferred Pharmacy Pharmacy Name Phone [facility] [place] [phone] Your Updated Medication List  
  
   
This list is accurate as of: 12/11/17  9:17 AM.  Always use your most recent med list.  
  
  
  
  
 ALEVE 220 mg Cap Generic drug:  naproxen sodium 1-2 tabs every 12 hours as needed  
  
 amoxicillin 500 mg capsule Commonly known as:  AMOXIL Take 1 Cap by mouth three (3) times daily for 10 days. atorvastatin 20 mg tablet Commonly known as:  LIPITOR Take 1 Tab by mouth daily. For cholesterol  
  
 omeprazole 20 mg capsule Commonly known as:  PRILOSEC  
TAKE 1 CAPSULE BY MOUTH DAILY FOR INDEGESTION  
  
 promethazine-dextromethorphan 6.25-15 mg/5 mL syrup Commonly known as:  PROMETHAZINE-DM Take 5 mL by mouth four (4) times daily as needed for Cough. Prescriptions Sent to Pharmacy Refills  
 amoxicillin (AMOXIL) 500 mg capsule 0 Sig: Take 1 Cap by mouth three (3) times daily for 10 days. Class: Normal  
 Pharmacy: [email or web address] [address] Ph #: [phone] Route: Oral  
 promethazine-dextromethorphan (PROMETHAZINE-DM) 6.25-15 mg/5 mL syrup 2 Sig: Take 5 mL by mouth four (4) times daily as needed for Cough. Class: Normal  
 Pharmacy: [email or web address] [address] Ph #: [phone] Route: Oral  
  
We Performed the Following CBC WITH AUTOMATED DIFF [71104 CPT(R)] LIPID PANEL [47585 CPT(R)] METABOLIC PANEL, COMPREHENSIVE [06958 CPT(R)] To-Do List   
 12/11/2017 Imaging:  XR WRIST RT AP/LAT Introducing [facility]! Corona Peres introduces Kobojo patient portal. Now you can access parts of your medical record, email your doctor's office, and request medication refills online. 1. In your internet browser, go to [email or web address] 2. Click on the First Time User? Click Here link in the Sign In box. You will see the New Member Sign Up page. 3. Enter your Kobojo Access Code exactly as it appears below. You will not need to use this code after youve completed the sign-up process. If you do not sign up before the expiration date, you must request a new code. · Snaptracs Access Code: [email or web address] Expires: 3/11/2018  8:43 AM 
 
4. Enter the last four digits of your Social Security Number (xxxx) and Date of Birth (mm/dd/yyyy) as indicated and click Submit. You will be taken to the next sign-up page. 5. Create a Snaptracs ID. This will be your Snaptracs login ID and cannot be changed, so think of one that is secure and easy to remember. 6. Create a Snaptracs password. You can change your password at any time. 7. Enter your Password Reset Question and Answer. This can be used at a later time if you forget your password. 8. Enter your e-mail address. You will receive e-mail notification when new information is available in [address] 9. Click Sign Up. You can now view and download portions of your medical record. 10. Click the Download Summary menu link to download a portable copy of your medical information. If you have questions, please visit the Frequently Asked Questions section of the Snaptracs website. Remember, Snaptracs is NOT to be used for urgent needs. For medical emergencies, dial 911. Now available from your iPhone and Android! Please provide this summary of care documentation to your next provider. Your primary care clinician is listed as Leigh Sal. If you have any questions after today's visit, please call [phone].

## 2017-12-11 NOTE — PROGRESS NOTES
Chief Complaint   Patient presents with    Cholesterol Problem    GERD     1. Have you been to the ER, urgent care clinic since your last visit? Hospitalized since your last visit? No    2. Have you seen or consulted any other health care providers outside of the [address] [place] [address] since your last visit? Include any pap smears or colon screening.  No     Health Maintenance Due   Topic Date Due    GLAUCOMA SCREENING Q2Y  07/07/2008    OSTEOPOROSIS SCREENING (DEXA)  07/07/2008    MEDICARE YEARLY EXAM  12/07/2017

## 2017-12-11 NOTE — PROGRESS NOTES
HISTORY OF PRESENT ILLNESS  Del Calabrese is a 74 y.o. female. HPI In for cholesterol check. Getting over the flu. No complaints of chest pain, shortness of breath, TIAs, claudication or edema. ROS    Physical Exam   Constitutional: She is oriented to person, place, and time. She appears well-developed and well-nourished. Neck: No thyromegaly present. Cardiovascular: Normal rate, regular rhythm and normal heart sounds. No murmur heard. Pulmonary/Chest: Effort normal and breath sounds normal. She has no wheezes. Abdominal: Soft. Bowel sounds are normal. She exhibits no distension. There is no tenderness. There is no rebound and no guarding. Musculoskeletal: Normal range of motion. She exhibits no edema. Lymphadenopathy:     She has no cervical adenopathy. Neurological: She is alert and oriented to person, place, and time. Nursing note and vitals reviewed. ASSESSMENT and PLAN  Orders Placed This Encounter    XR WRIST RT AP/LAT    CBC WITH AUTOMATED DIFF    METABOLIC PANEL, COMPREHENSIVE    LIPID PANEL    amoxicillin (AMOXIL) 500 mg capsule    promethazine-dextromethorphan (PROMETHAZINE-DM) 6.25-15 mg/5 mL syrup     Diagnoses and all orders for this visit:    1. Hypercholesterolemia  -     CBC WITH AUTOMATED DIFF  -     METABOLIC PANEL, COMPREHENSIVE  -     LIPID PANEL    2. Right wrist pain  -     XR WRIST RT AP/LAT; Future    3. Bronchitis    Other orders  -     amoxicillin (AMOXIL) 500 mg capsule; Take 1 Cap by mouth three (3) times daily for 10 days. -     promethazine-dextromethorphan (PROMETHAZINE-DM) 6.25-15 mg/5 mL syrup; Take 5 mL by mouth four (4) times daily as needed for Cough. Follow-up Disposition:  Return in about 6 months (around 6/11/2018).

## 2017-12-12 LAB
ALBUMIN SERPL-MCNC: 3.8 G/DL (ref 3.5–4.8)
ALBUMIN/GLOB SERPL: 1.1 {RATIO} (ref 1.2–2.2)
ALP SERPL-CCNC: 113 IU/L (ref 39–117)
ALT SERPL-CCNC: 12 IU/L (ref 0–32)
AST SERPL-CCNC: 15 IU/L (ref 0–40)
BASOPHILS # BLD AUTO: 0.1 X10E3/UL (ref 0–0.2)
BASOPHILS NFR BLD AUTO: 0 %
BILIRUB SERPL-MCNC: 0.3 MG/DL (ref 0–1.2)
BUN SERPL-MCNC: 12 MG/DL (ref 8–27)
BUN/CREAT SERPL: 11 (ref 12–28)
CALCIUM SERPL-MCNC: 9.1 MG/DL (ref 8.7–10.3)
CHLORIDE SERPL-SCNC: 101 MMOL/L (ref 96–106)
CHOLEST SERPL-MCNC: 139 MG/DL (ref 100–199)
CO2 SERPL-SCNC: 22 MMOL/L (ref 18–29)
CREAT SERPL-MCNC: 1.1 MG/DL (ref 0.57–1)
EOSINOPHIL # BLD AUTO: 0.1 X10E3/UL (ref 0–0.4)
EOSINOPHIL NFR BLD AUTO: 1 %
ERYTHROCYTE [DISTWIDTH] IN BLOOD BY AUTOMATED COUNT: 13.7 % (ref 12.3–15.4)
GFR SERPLBLD CREATININE-BSD FMLA CKD-EPI: 50 ML/MIN/1.73
GFR SERPLBLD CREATININE-BSD FMLA CKD-EPI: 57 ML/MIN/1.73
GLOBULIN SER CALC-MCNC: 3.4 G/DL (ref 1.5–4.5)
GLUCOSE SERPL-MCNC: 90 MG/DL (ref 65–99)
HCT VFR BLD AUTO: 30.2 % (ref 34–46.6)
HDLC SERPL-MCNC: 36 MG/DL
HGB BLD-MCNC: 10.1 G/DL (ref 11.1–15.9)
IMM GRANULOCYTES # BLD: 0.1 X10E3/UL (ref 0–0.1)
IMM GRANULOCYTES NFR BLD: 1 %
INTERPRETATION, 910389: NORMAL
INTERPRETATION: NORMAL
LDLC SERPL CALC-MCNC: 89 MG/DL (ref 0–99)
LYMPHOCYTES # BLD AUTO: 2.7 X10E3/UL (ref 0.7–3.1)
LYMPHOCYTES NFR BLD AUTO: 22 %
MCH RBC QN AUTO: 31 PG (ref 26.6–33)
MCHC RBC AUTO-ENTMCNC: 33.4 G/DL (ref 31.5–35.7)
MCV RBC AUTO: 93 FL (ref 79–97)
MONOCYTES # BLD AUTO: 1.5 X10E3/UL (ref 0.1–0.9)
MONOCYTES NFR BLD AUTO: 12 %
MORPHOLOGY BLD-IMP: ABNORMAL
NEUTROPHILS # BLD AUTO: 8 X10E3/UL (ref 1.4–7)
NEUTROPHILS NFR BLD AUTO: 64 %
PDF IMAGE, 910387: NORMAL
PLATELET # BLD AUTO: 455 X10E3/UL (ref 150–379)
POTASSIUM SERPL-SCNC: 4.6 MMOL/L (ref 3.5–5.2)
PROT SERPL-MCNC: 7.2 G/DL (ref 6–8.5)
RBC # BLD AUTO: 3.26 X10E6/UL (ref 3.77–5.28)
SODIUM SERPL-SCNC: 139 MMOL/L (ref 134–144)
TRIGL SERPL-MCNC: 70 MG/DL (ref 0–149)
VLDLC SERPL CALC-MCNC: 14 MG/DL (ref 5–40)
WBC # BLD AUTO: 12.5 X10E3/UL (ref 3.4–10.8)

## 2017-12-18 RX ORDER — ATORVASTATIN CALCIUM 20 MG/1
20 TABLET, FILM COATED ORAL DAILY
Qty: 90 TAB | Refills: 3 | Status: SHIPPED | OUTPATIENT
Start: 2017-12-18 | End: 2019-01-08 | Stop reason: SDUPTHER

## 2018-04-27 ENCOUNTER — DOCUMENTATION ONLY (OUTPATIENT)
Dept: FAMILY MEDICINE CLINIC | Age: 73
End: 2018-04-27

## 2018-04-27 NOTE — PROGRESS NOTES
Received a call from a nurse practitoner from [employer] stating she went to see pt today. She did a PAD test on pt and found pt has significant  PAD in left leg and moderate PAD in right leg. Stated she will fax over notes next week and ask us to follow up with pt.  Told NP would let  know

## 2018-07-13 ENCOUNTER — HOSPITAL ENCOUNTER (OUTPATIENT)
Dept: MAMMOGRAPHY | Age: 73
Discharge: HOME | End: 2018-07-13
Attending: FAMILY MEDICINE
Payer: MEDICARE

## 2018-07-13 DIAGNOSIS — Z12.31 VISIT FOR SCREENING MAMMOGRAM: ICD-10-CM

## 2018-07-13 PROCEDURE — 77067 SCR MAMMO BI INCL CAD: CPT

## 2019-01-08 ENCOUNTER — OFFICE VISIT (OUTPATIENT)
Dept: FAMILY MEDICINE CLINIC | Age: 74
End: 2019-01-08

## 2019-01-08 VITALS
HEART RATE: 47 BPM | BODY MASS INDEX: 20.23 KG/M2 | OXYGEN SATURATION: 82 % | WEIGHT: 121.4 LBS | DIASTOLIC BLOOD PRESSURE: 36 MMHG | HEIGHT: 65 IN | SYSTOLIC BLOOD PRESSURE: 111 MMHG | RESPIRATION RATE: 14 BRPM

## 2019-01-08 DIAGNOSIS — R53.1 WEAKNESS: ICD-10-CM

## 2019-01-08 DIAGNOSIS — E78.00 HYPERCHOLESTEROLEMIA: ICD-10-CM

## 2019-01-08 DIAGNOSIS — R30.0 DYSURIA: ICD-10-CM

## 2019-01-08 DIAGNOSIS — Z23 ENCOUNTER FOR IMMUNIZATION: Primary | ICD-10-CM

## 2019-01-08 DIAGNOSIS — E53.8 VITAMIN B12 DEFICIENCY: ICD-10-CM

## 2019-01-08 LAB
BILIRUB UR QL STRIP: NEGATIVE
GLUCOSE UR-MCNC: NEGATIVE MG/DL
KETONES P FAST UR STRIP-MCNC: NEGATIVE MG/DL
PH UR STRIP: 5.5 [PH] (ref 4.6–8)
PROT UR QL STRIP: NEGATIVE
SP GR UR STRIP: 1 (ref 1–1.03)
UA UROBILINOGEN AMB POC: NORMAL (ref 0.2–1)
URINALYSIS CLARITY POC: CLEAR
URINALYSIS COLOR POC: YELLOW
URINE BLOOD POC: NEGATIVE
URINE LEUKOCYTES POC: NORMAL
URINE NITRITES POC: NEGATIVE

## 2019-01-08 RX ORDER — ATORVASTATIN CALCIUM 20 MG/1
20 TABLET, FILM COATED ORAL DAILY
Qty: 90 TAB | Refills: 3 | Status: SHIPPED | OUTPATIENT
Start: 2019-01-08 | End: 2019-06-18 | Stop reason: SDUPTHER

## 2019-01-08 NOTE — PROGRESS NOTES
HISTORY OF PRESENT ILLNESS Blaise Troncoso is a 74 y.o. female. HPI In for medicare wellness exam. Still somewhat upset over husbands death. 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 Also is involved with 2 senior groups. Not currently seeing any other doctors. utd on mammograms and colonoscopy. Due for a prevnar 13. Had the flu shot earlier this year used to have some dysphagia , was dxed with achalasia, however, symptoms have improved from 1-2 years ago. Review of Systems Constitutional: Positive for malaise/fatigue. Negative for weight loss. HENT: Negative for hearing loss and tinnitus. Eyes: Negative for blurred vision and double vision. Respiratory: Negative for cough and shortness of breath. Smokes 3-4 cigarettes a day. Cardiovascular: Negative for chest pain and orthopnea. Doing some walking , a mile a day. Gastrointestinal: Negative for abdominal pain and blood in stool. Genitourinary: Negative for frequency and hematuria. Skin: Negative for itching and rash. Neurological: Negative for focal weakness and loss of consciousness. No falls, no canes or walkers. Independent in all ADLS Psychiatric/Behavioral: Negative for depression. Still feels lonely since husbands death May 6. No alcohol. Feels safe at home. Had been  for 56 years. Physical Exam  
Constitutional: She appears well-developed and well-nourished. HENT:  
Right Ear: External ear normal.  
Left Ear: External ear normal.  
Mouth/Throat: Oropharynx is clear and moist.  
Neck: No thyromegaly present. Cardiovascular: Normal rate, regular rhythm, normal heart sounds and intact distal pulses. Pulmonary/Chest: Breath sounds normal. She has no wheezes. Abdominal: Soft. Bowel sounds are normal. She exhibits no distension and no mass. There is no tenderness. Musculoskeletal: She exhibits no edema. Lymphadenopathy:  
  She has no cervical adenopathy. Neurological: AMT4- 4/4  
phalens test negative. tinels negative. Nursing note and vitals reviewed. ASSESSMENT and PLAN Orders Placed This Encounter  Pneumococcal conjugate 13 valent, IM (PREVNAR-13)  CBC WITH AUTOMATED DIFF  
 METABOLIC PANEL, COMPREHENSIVE  LIPID PANEL  
 TSH 3RD GENERATION  
 VITAMIN B12  
 AMB POC URINALYSIS DIP STICK AUTO W/ MICRO  atorvastatin (LIPITOR) 20 mg tablet Diagnoses and all orders for this visit: 1. Encounter for immunization 
-     PNEUMOCOCCAL CONJ VACCINE 13 VALENT IM 2. Hypercholesterolemia -     METABOLIC PANEL, COMPREHENSIVE 
-     LIPID PANEL 3. Vitamin B12 deficiency 
-     VITAMIN B12 
 
4. Weakness 
-     CBC WITH AUTOMATED DIFF 
-     TSH 3RD GENERATION 5. Dysuria -     AMB POC URINALYSIS DIP STICK AUTO W/ MICRO Other orders 
-     atorvastatin (LIPITOR) 20 mg tablet; Take 1 Tab by mouth daily. For cholesterol Follow-up Disposition: 
Return in about 1 year (around 1/8/2020).

## 2019-01-08 NOTE — PROGRESS NOTES
Chief Complaint Patient presents with  Complete Physical  
 Annual Wellness Visit 1. Have you been to the ER, urgent care clinic since your last visit? Hospitalized since your last visit? No 
 
2. Have you seen or consulted any other health care providers outside of the [address] [place] [address] since your last visit? Include any pap smears or colon screening. No  
 
Health Maintenance Due Topic Date Due  Shingrix Vaccine Age 50> (1 of 2) 07/07/1993  GLAUCOMA SCREENING Q2Y  07/07/2008  Bone Densitometry (Dexa) Screening  07/07/2008  MEDICARE YEARLY EXAM  03/14/2018 After obtaining consent, and per orders of Dr. Jacques Perdue, injection of Prevnar 13 (Left deltoid) given by Vale Phillips LPN. Patient instructed to remain in clinic for 20 minutes afterwards, and to report any adverse reaction to me immediately. Pt did not report any negative reactions

## 2019-01-09 LAB
ALBUMIN SERPL-MCNC: 4.5 G/DL (ref 3.5–4.8)
ALBUMIN/GLOB SERPL: 1.5 {RATIO} (ref 1.2–2.2)
ALP SERPL-CCNC: 106 IU/L (ref 39–117)
ALT SERPL-CCNC: 21 IU/L (ref 0–32)
AST SERPL-CCNC: 25 IU/L (ref 0–40)
BASOPHILS # BLD AUTO: 0 X10E3/UL (ref 0–0.2)
BASOPHILS NFR BLD AUTO: 1 %
BILIRUB SERPL-MCNC: <0.2 MG/DL (ref 0–1.2)
BUN SERPL-MCNC: 17 MG/DL (ref 8–27)
BUN/CREAT SERPL: 15 (ref 12–28)
CALCIUM SERPL-MCNC: 9.7 MG/DL (ref 8.7–10.3)
CHLORIDE SERPL-SCNC: 104 MMOL/L (ref 96–106)
CHOLEST SERPL-MCNC: 198 MG/DL (ref 100–199)
CO2 SERPL-SCNC: 25 MMOL/L (ref 20–29)
CREAT SERPL-MCNC: 1.11 MG/DL (ref 0.57–1)
EOSINOPHIL # BLD AUTO: 0.1 X10E3/UL (ref 0–0.4)
EOSINOPHIL NFR BLD AUTO: 2 %
ERYTHROCYTE [DISTWIDTH] IN BLOOD BY AUTOMATED COUNT: 14.8 % (ref 12.3–15.4)
GLOBULIN SER CALC-MCNC: 3.1 G/DL (ref 1.5–4.5)
GLUCOSE SERPL-MCNC: 72 MG/DL (ref 65–99)
HCT VFR BLD AUTO: 35.1 % (ref 34–46.6)
HDLC SERPL-MCNC: 76 MG/DL
HGB BLD-MCNC: 11.6 G/DL (ref 11.1–15.9)
IMM GRANULOCYTES # BLD AUTO: 0 X10E3/UL (ref 0–0.1)
IMM GRANULOCYTES NFR BLD AUTO: 0 %
INTERPRETATION, 910389: NORMAL
INTERPRETATION: NORMAL
LDLC SERPL CALC-MCNC: 110 MG/DL (ref 0–99)
LYMPHOCYTES # BLD AUTO: 3.1 X10E3/UL (ref 0.7–3.1)
LYMPHOCYTES NFR BLD AUTO: 41 %
MCH RBC QN AUTO: 30.9 PG (ref 26.6–33)
MCHC RBC AUTO-ENTMCNC: 33 G/DL (ref 31.5–35.7)
MCV RBC AUTO: 93 FL (ref 79–97)
MONOCYTES # BLD AUTO: 0.4 X10E3/UL (ref 0.1–0.9)
MONOCYTES NFR BLD AUTO: 5 %
NEUTROPHILS # BLD AUTO: 4 X10E3/UL (ref 1.4–7)
NEUTROPHILS NFR BLD AUTO: 51 %
PDF IMAGE, 910387: NORMAL
PLATELET # BLD AUTO: 250 X10E3/UL (ref 150–379)
POTASSIUM SERPL-SCNC: 5.3 MMOL/L (ref 3.5–5.2)
PROT SERPL-MCNC: 7.6 G/DL (ref 6–8.5)
RBC # BLD AUTO: 3.76 X10E6/UL (ref 3.77–5.28)
SODIUM SERPL-SCNC: 142 MMOL/L (ref 134–144)
TRIGL SERPL-MCNC: 58 MG/DL (ref 0–149)
TSH SERPL DL<=0.005 MIU/L-ACNC: 1.87 UIU/ML (ref 0.45–4.5)
VIT B12 SERPL-MCNC: 739 PG/ML (ref 232–1245)
VLDLC SERPL CALC-MCNC: 12 MG/DL (ref 5–40)
WBC # BLD AUTO: 7.7 X10E3/UL (ref 3.4–10.8)

## 2019-06-18 ENCOUNTER — OFFICE VISIT (OUTPATIENT)
Dept: FAMILY MEDICINE CLINIC | Age: 74
End: 2019-06-18

## 2019-06-18 VITALS
HEART RATE: 60 BPM | WEIGHT: 123 LBS | SYSTOLIC BLOOD PRESSURE: 125 MMHG | HEIGHT: 65 IN | BODY MASS INDEX: 20.49 KG/M2 | RESPIRATION RATE: 16 BRPM | OXYGEN SATURATION: 98 % | TEMPERATURE: 97.6 F | DIASTOLIC BLOOD PRESSURE: 54 MMHG

## 2019-06-18 DIAGNOSIS — R10.9 STOMACH PAIN: Primary | ICD-10-CM

## 2019-06-18 DIAGNOSIS — R53.1 WEAKNESS: ICD-10-CM

## 2019-06-18 LAB
BILIRUB UR QL STRIP: NEGATIVE
GLUCOSE UR-MCNC: NEGATIVE MG/DL
GRAN# POC: NORMAL K/UL
GRAN% POC: NORMAL %
HCT VFR BLD CALC: NORMAL %
HGB BLD-MCNC: NORMAL G/DL
KETONES P FAST UR STRIP-MCNC: NEGATIVE MG/DL
LY# POC: NORMAL K/UL
LY% POC: NORMAL %
MCH RBC QN: NORMAL PG
MCHC RBC-ENTMCNC: NORMAL G/DL
MCV RBC: NORMAL FL
MID #, POC: NORMAL 10^3/UL
MID% POC: NORMAL %
PH UR STRIP: 6 [PH] (ref 4.6–8)
PLATELET # BLD: NORMAL K/UL
PROT UR QL STRIP: NEGATIVE
RBC # BLD: NORMAL M/UL
SP GR UR STRIP: 1 (ref 1–1.03)
UA UROBILINOGEN AMB POC: NORMAL (ref 0.2–1)
URINALYSIS CLARITY POC: CLEAR
URINALYSIS COLOR POC: YELLOW
URINE BLOOD POC: NEGATIVE
URINE LEUKOCYTES POC: NEGATIVE
URINE NITRITES POC: NEGATIVE
WBC # BLD: NORMAL K/UL

## 2019-06-18 RX ORDER — OMEPRAZOLE 20 MG/1
20 CAPSULE, DELAYED RELEASE ORAL DAILY
Qty: 90 CAP | Refills: 3 | Status: SHIPPED | OUTPATIENT
Start: 2019-06-18 | End: 2019-12-18 | Stop reason: SDUPTHER

## 2019-06-18 RX ORDER — ATORVASTATIN CALCIUM 20 MG/1
20 TABLET, FILM COATED ORAL DAILY
Qty: 90 TAB | Refills: 3 | Status: SHIPPED | OUTPATIENT
Start: 2019-06-18 | End: 2019-12-18 | Stop reason: SDUPTHER

## 2019-06-18 NOTE — PROGRESS NOTES
HISTORY OF PRESENT ILLNESS  Luis Alberto Carnes is a 74 y.o. female. HPI Says that feels like something is moving around in stomach and that stomach is swelling up. Had one episode of indigestion in last 2 weeks, felt like food was hung up in throat, had a burning sensation. Drank a lot of water and symptoms subsided after one hour. Has had 2 episodes in last 3 months. No diarrhea, constipation. No difficulty voiding. No vaginal bleeding. ROS    Physical Exam   Constitutional: She appears well-developed and well-nourished. HENT:   Right Ear: External ear normal.   Left Ear: External ear normal.   Mouth/Throat: Oropharynx is clear and moist.   Neck: No thyromegaly present. Cardiovascular: Normal rate, regular rhythm, normal heart sounds and intact distal pulses. Pulmonary/Chest: Breath sounds normal. She has no wheezes. Abdominal: Soft. Bowel sounds are normal. She exhibits no distension and no mass. There is no tenderness. Mild lower quadrant tenderness bilaterally   Musculoskeletal: She exhibits no edema. Lymphadenopathy:     She has no cervical adenopathy. Nursing note and vitals reviewed. ASSESSMENT and PLAN  Orders Placed This Encounter    US ABD COMP    US PELV NON OBS    METABOLIC PANEL, COMPREHENSIVE    TSH 3RD GENERATION    AMB POC URINALYSIS DIP STICK AUTO W/O MICRO    AMB POC COMPLETE CBC,AUTOMATED ENTER    atorvastatin (LIPITOR) 20 mg tablet    omeprazole (PRILOSEC) 20 mg capsule     Diagnoses and all orders for this visit:    1. Stomach pain  -     AMB POC URINALYSIS DIP STICK AUTO W/O MICRO  -     AMB POC COMPLETE CBC,AUTOMATED ENTER  -     METABOLIC PANEL, COMPREHENSIVE  -     US ABD COMP; Future  -     US PELV NON OBS; Future    2. Weakness  -     TSH 3RD GENERATION    Other orders  -     atorvastatin (LIPITOR) 20 mg tablet; Take 1 Tab by mouth daily. For cholesterol  -     omeprazole (PRILOSEC) 20 mg capsule; Take 1 Cap by mouth daily for 360 days.  For stomach      Follow-up and Dispositions    · Return in about 6 months (around 12/18/2019).

## 2019-06-18 NOTE — PROGRESS NOTES
Chief Complaint   Patient presents with    Hernia (Non Specific)     1. Have you been to the ER, urgent care clinic since your last visit? Hospitalized since your last visit? No    2. Have you seen or consulted any other health care providers outside of the [address] [place] [address] since your last visit? Include any pap smears or colon screening.  No     Health Maintenance Due   Topic Date Due    Shingrix Vaccine Age 49> (1 of 2) 07/07/1993    GLAUCOMA SCREENING Q2Y  07/07/2008    Bone Densitometry (Dexa) Screening  07/07/2008

## 2019-06-19 LAB
ALBUMIN SERPL-MCNC: 4.4 G/DL (ref 3.5–4.8)
ALBUMIN/GLOB SERPL: 1.5 {RATIO} (ref 1.2–2.2)
ALP SERPL-CCNC: 101 IU/L (ref 39–117)
ALT SERPL-CCNC: 17 IU/L (ref 0–32)
AST SERPL-CCNC: 24 IU/L (ref 0–40)
BILIRUB SERPL-MCNC: <0.2 MG/DL (ref 0–1.2)
BUN SERPL-MCNC: 16 MG/DL (ref 8–27)
BUN/CREAT SERPL: 14 (ref 12–28)
CALCIUM SERPL-MCNC: 9.7 MG/DL (ref 8.7–10.3)
CHLORIDE SERPL-SCNC: 104 MMOL/L (ref 96–106)
CO2 SERPL-SCNC: 26 MMOL/L (ref 20–29)
CREAT SERPL-MCNC: 1.15 MG/DL (ref 0.57–1)
GLOBULIN SER CALC-MCNC: 3 G/DL (ref 1.5–4.5)
GLUCOSE SERPL-MCNC: 53 MG/DL (ref 65–99)
INTERPRETATION: NORMAL
POTASSIUM SERPL-SCNC: 5.1 MMOL/L (ref 3.5–5.2)
PROT SERPL-MCNC: 7.4 G/DL (ref 6–8.5)
SODIUM SERPL-SCNC: 141 MMOL/L (ref 134–144)

## 2019-06-20 LAB — TSH SERPL DL<=0.005 MIU/L-ACNC: 1.78 UIU/ML (ref 0.45–4.5)

## 2019-06-27 ENCOUNTER — HOSPITAL ENCOUNTER (OUTPATIENT)
Dept: ULTRASOUND IMAGING | Age: 74
Discharge: HOME | End: 2019-06-27
Attending: FAMILY MEDICINE
Payer: MEDICARE

## 2019-06-27 DIAGNOSIS — R10.9 STOMACH ACHE: ICD-10-CM

## 2019-06-27 DIAGNOSIS — R10.9 STOMACH PAIN: ICD-10-CM

## 2019-06-27 PROCEDURE — 76856 US EXAM PELVIC COMPLETE: CPT

## 2019-06-27 PROCEDURE — 76700 US EXAM ABDOM COMPLETE: CPT

## 2019-07-01 ENCOUNTER — LAB ONLY (OUTPATIENT)
Dept: FAMILY MEDICINE CLINIC | Age: 74
End: 2019-07-01

## 2019-07-01 DIAGNOSIS — R10.9 STOMACH PAIN: Primary | ICD-10-CM

## 2019-07-02 LAB — FOLATE SERPL-MCNC: 14.6 NG/ML

## 2019-08-01 ENCOUNTER — OFFICE VISIT (OUTPATIENT)
Dept: FAMILY MEDICINE CLINIC | Age: 74
End: 2019-08-01

## 2019-08-01 VITALS
DIASTOLIC BLOOD PRESSURE: 55 MMHG | HEIGHT: 65 IN | WEIGHT: 123.6 LBS | RESPIRATION RATE: 14 BRPM | HEART RATE: 66 BPM | OXYGEN SATURATION: 100 % | SYSTOLIC BLOOD PRESSURE: 126 MMHG | TEMPERATURE: 97.7 F | BODY MASS INDEX: 20.59 KG/M2

## 2019-08-01 DIAGNOSIS — B86 SCABIES: Primary | ICD-10-CM

## 2019-08-01 RX ORDER — PREDNISONE 20 MG/1
20 TABLET ORAL 2 TIMES DAILY
Qty: 10 TAB | Refills: 0 | Status: SHIPPED | OUTPATIENT
Start: 2019-08-01 | End: 2019-12-18 | Stop reason: ALTCHOICE

## 2019-08-01 RX ORDER — PERMETHRIN 50 MG/G
CREAM TOPICAL
Qty: 60 G | Refills: 0 | Status: SHIPPED | OUTPATIENT
Start: 2019-08-01 | End: 2019-12-18 | Stop reason: ALTCHOICE

## 2019-08-01 RX ORDER — TRIAMCINOLONE ACETONIDE 5 MG/G
CREAM TOPICAL 2 TIMES DAILY
Qty: 454 G | Refills: 3 | Status: SHIPPED | OUTPATIENT
Start: 2019-08-01 | End: 2019-12-18 | Stop reason: ALTCHOICE

## 2019-08-01 NOTE — PROGRESS NOTES
HISTORY OF PRESENT ILLNESS  Danita Gore is a 66 y.o. female. HPI pruritic rash on arms and legs for one week. Came on while vacationing in [facility]. Was outside all day long while there. Mostly was walking thru cities. Not taking any meds for it. Has been using triamcinolone cream 0.5%- seemed to help. ROS    Physical Exam   Constitutional: She appears well-developed and well-nourished. HENT:   Right Ear: External ear normal.   Left Ear: External ear normal.   Mouth/Throat: Oropharynx is clear and moist.   Neck: No thyromegaly present. Cardiovascular: Normal rate, regular rhythm, normal heart sounds and intact distal pulses. Pulmonary/Chest: Breath sounds normal. She has no wheezes. Abdominal: Soft. Bowel sounds are normal. She exhibits no distension and no mass. There is no tenderness. Musculoskeletal: She exhibits no edema. Lymphadenopathy:     She has no cervical adenopathy. Skin:   Several papules and burrows on both arms and legs. Nursing note and vitals reviewed. ASSESSMENT and PLAN  Orders Placed This Encounter    permethrin (ACTICIN) 5 % topical cream    triamcinolone (ARISTOCORT) 0.5 % topical cream    predniSONE (DELTASONE) 20 mg tablet     Diagnoses and all orders for this visit:    1. Scabies    Other orders  -     permethrin (ACTICIN) 5 % topical cream; Apply from neck to toes (use  1/2 the bottle), leave on for 8 hours, then wash it off. May repeat in one week if no better  -     triamcinolone (ARISTOCORT) 0.5 % topical cream; Apply  to affected area two (2) times a day. use thin layer twice daily to affected areas, for itching.  -     predniSONE (DELTASONE) 20 mg tablet; Take 20 mg by mouth two (2) times a day. For itching.

## 2019-08-01 NOTE — PROGRESS NOTES
Chief Complaint   Patient presents with    Rash     pt was in [facility] July 25 2019  Rash on Arms and legs and itches  pt denies being bitten by insects. 1. Have you been to the ER, urgent care clinic since your last visit? Hospitalized since your last visit? No    2. Have you seen or consulted any other health care providers outside of the [address] [place] [address] since your last visit? Include any pap smears or colon screening.  No'        Health Maintenance Due   Topic Date Due    Shingrix Vaccine Age 49> (1 of 2) 07/07/1993    GLAUCOMA SCREENING Q2Y  07/07/2008    Bone Densitometry (Dexa) Screening  07/07/2008    Influenza Age 9 to Adult  08/01/2019

## 2019-08-05 ENCOUNTER — HOSPITAL ENCOUNTER (OUTPATIENT)
Dept: MAMMOGRAPHY | Age: 74
Discharge: HOME | End: 2019-08-05
Attending: FAMILY MEDICINE
Payer: MEDICARE

## 2019-08-05 DIAGNOSIS — Z12.31 VISIT FOR SCREENING MAMMOGRAM: ICD-10-CM

## 2019-08-05 PROCEDURE — 77067 SCR MAMMO BI INCL CAD: CPT

## 2019-12-18 ENCOUNTER — OFFICE VISIT (OUTPATIENT)
Dept: FAMILY MEDICINE CLINIC | Age: 74
End: 2019-12-18

## 2019-12-18 VITALS
TEMPERATURE: 98.2 F | BODY MASS INDEX: 20.76 KG/M2 | RESPIRATION RATE: 16 BRPM | OXYGEN SATURATION: 96 % | SYSTOLIC BLOOD PRESSURE: 130 MMHG | WEIGHT: 124.6 LBS | HEIGHT: 65 IN | HEART RATE: 51 BPM | DIASTOLIC BLOOD PRESSURE: 53 MMHG

## 2019-12-18 DIAGNOSIS — G44.52 NEW DAILY PERSISTENT HEADACHE: ICD-10-CM

## 2019-12-18 DIAGNOSIS — E78.00 HYPERCHOLESTEROLEMIA: ICD-10-CM

## 2019-12-18 DIAGNOSIS — N95.1 MENOPAUSAL SYMPTOM: Primary | ICD-10-CM

## 2019-12-18 RX ORDER — ATORVASTATIN CALCIUM 20 MG/1
20 TABLET, FILM COATED ORAL DAILY
Qty: 90 TAB | Refills: 3 | Status: SHIPPED | OUTPATIENT
Start: 2019-12-18 | End: 2020-12-21 | Stop reason: SDUPTHER

## 2019-12-18 RX ORDER — BUTALBITAL, ACETAMINOPHEN AND CAFFEINE 50; 325; 40 MG/1; MG/1; MG/1
TABLET ORAL
Qty: 50 TAB | Refills: 1 | Status: SHIPPED | OUTPATIENT
Start: 2019-12-18 | End: 2020-12-21 | Stop reason: SDUPTHER

## 2019-12-18 RX ORDER — OMEPRAZOLE 20 MG/1
20 CAPSULE, DELAYED RELEASE ORAL DAILY
Qty: 90 CAP | Refills: 3 | Status: SHIPPED | OUTPATIENT
Start: 2019-12-18 | End: 2020-12-12

## 2019-12-18 NOTE — PROGRESS NOTES
.  Chief Complaint   Patient presents with    Complete Physical     Here for her yearly physical.  She is complaining of a throbbing headache over her left temple. 1. Have you been to the ER, urgent care clinic since your last visit? Hospitalized since your last visit? No    2. Have you seen or consulted any other health care providers outside of the [address] [place] [address] since your last visit? Include any pap smears or colon screening.  No

## 2019-12-18 NOTE — PROGRESS NOTES
HISTORY OF PRESENT ILLNESS  Preeti Louise is a 66 y.o. female. HPI In for cholesterol check. 3 week hx headache over L eye, comes on every day, pain lasts all day long, can keep her up at night. No assc nausea, vomiting. No phonophobia, photophobia. Eyesight ok. No focal neuro symptoms. No prior hx similar problem. NO prior hx headaches. No unusual stress. Advil, tylenol- minimal relief. Has also had numbness in tips of fingers, right worse than left, for the last month intermittently. ROS    Physical Exam  Vitals signs and nursing note reviewed. Constitutional:       Appearance: She is well-developed. HENT:      Head:      Comments: PERRLA. No injection of conjunctiva     Right Ear: External ear normal.      Left Ear: External ear normal.   Neck:      Thyroid: No thyromegaly. Cardiovascular:      Rate and Rhythm: Normal rate and regular rhythm. Heart sounds: Normal heart sounds. Pulmonary:      Breath sounds: Normal breath sounds. No wheezing. Abdominal:      General: Bowel sounds are normal. There is no distension. Palpations: Abdomen is soft. There is no mass. Tenderness: There is no tenderness. Lymphadenopathy:      Cervical: No cervical adenopathy. Neurological:      Cranial Nerves: No cranial nerve deficit. Comments: Cer- fnf intact  Gross motor intact  Gait- normal  Heel to toe - normal           ASSESSMENT and PLAN  Orders Placed This Encounter    DEXA BONE DENSITY STUDY AXIAL    CT HEAD WO CONT    SED RATE (ESR)    METABOLIC PANEL, COMPREHENSIVE    LIPID PANEL    CBC WITH AUTOMATED DIFF    butalbital-acetaminophen-caffeine (FIORICET, ESGIC) -40 mg per tablet    atorvastatin (LIPITOR) 20 mg tablet    omeprazole (PRILOSEC) 20 mg capsule     Diagnoses and all orders for this visit:    1. Menopausal symptom  -     DEXA BONE DENSITY STUDY AXIAL; Future    2.  New daily persistent headache  -     SED RATE (ESR)  -     CT HEAD WO CONT; Future  -     CBC WITH AUTOMATED DIFF    3. Hypercholesterolemia  -     METABOLIC PANEL, COMPREHENSIVE  -     LIPID PANEL    Other orders  -     butalbital-acetaminophen-caffeine (FIORICET, ESGIC) -40 mg per tablet; 1-2 tabs po every 6 hours as needed for headache  -     atorvastatin (LIPITOR) 20 mg tablet; Take 1 Tab by mouth daily. For cholesterol  -     omeprazole (PRILOSEC) 20 mg capsule; Take 1 Cap by mouth daily for 360 days.  For stomach

## 2019-12-19 ENCOUNTER — TELEPHONE (OUTPATIENT)
Dept: FAMILY MEDICINE CLINIC | Age: 74
End: 2019-12-19

## 2019-12-19 LAB
ALBUMIN SERPL-MCNC: 4.6 G/DL (ref 3.5–4.8)
ALBUMIN/GLOB SERPL: 1.5 {RATIO} (ref 1.2–2.2)
ALP SERPL-CCNC: 99 IU/L (ref 39–117)
ALT SERPL-CCNC: 22 IU/L (ref 0–32)
AST SERPL-CCNC: 26 IU/L (ref 0–40)
BASOPHILS # BLD AUTO: 0.1 X10E3/UL (ref 0–0.2)
BASOPHILS NFR BLD AUTO: 1 %
BILIRUB SERPL-MCNC: 0.2 MG/DL (ref 0–1.2)
BUN SERPL-MCNC: 18 MG/DL (ref 8–27)
BUN/CREAT SERPL: 18 (ref 12–28)
CALCIUM SERPL-MCNC: 10 MG/DL (ref 8.7–10.3)
CHLORIDE SERPL-SCNC: 104 MMOL/L (ref 96–106)
CHOLEST SERPL-MCNC: 229 MG/DL (ref 100–199)
CO2 SERPL-SCNC: 23 MMOL/L (ref 20–29)
CREAT SERPL-MCNC: 0.98 MG/DL (ref 0.57–1)
EOSINOPHIL # BLD AUTO: 0.2 X10E3/UL (ref 0–0.4)
EOSINOPHIL NFR BLD AUTO: 2 %
ERYTHROCYTE [DISTWIDTH] IN BLOOD BY AUTOMATED COUNT: 13.8 % (ref 12.3–15.4)
ERYTHROCYTE [SEDIMENTATION RATE] IN BLOOD BY WESTERGREN METHOD: 17 MM/HR (ref 0–40)
GLOBULIN SER CALC-MCNC: 3 G/DL (ref 1.5–4.5)
GLUCOSE SERPL-MCNC: 79 MG/DL (ref 65–99)
HCT VFR BLD AUTO: 34.9 % (ref 34–46.6)
HDLC SERPL-MCNC: 85 MG/DL
HGB BLD-MCNC: 11.6 G/DL (ref 11.1–15.9)
IMM GRANULOCYTES # BLD AUTO: 0 X10E3/UL (ref 0–0.1)
IMM GRANULOCYTES NFR BLD AUTO: 0 %
INTERPRETATION, 910389: NORMAL
INTERPRETATION: NORMAL
LDLC SERPL CALC-MCNC: 128 MG/DL (ref 0–99)
LYMPHOCYTES # BLD AUTO: 3.5 X10E3/UL (ref 0.7–3.1)
LYMPHOCYTES NFR BLD AUTO: 43 %
MCH RBC QN AUTO: 31.3 PG (ref 26.6–33)
MCHC RBC AUTO-ENTMCNC: 33.2 G/DL (ref 31.5–35.7)
MCV RBC AUTO: 94 FL (ref 79–97)
MONOCYTES # BLD AUTO: 0.6 X10E3/UL (ref 0.1–0.9)
MONOCYTES NFR BLD AUTO: 7 %
NEUTROPHILS # BLD AUTO: 3.7 X10E3/UL (ref 1.4–7)
NEUTROPHILS NFR BLD AUTO: 47 %
PDF IMAGE, 910387: NORMAL
PLATELET # BLD AUTO: 215 X10E3/UL (ref 150–450)
POTASSIUM SERPL-SCNC: 5.3 MMOL/L (ref 3.5–5.2)
PROT SERPL-MCNC: 7.6 G/DL (ref 6–8.5)
RBC # BLD AUTO: 3.71 X10E6/UL (ref 3.77–5.28)
SODIUM SERPL-SCNC: 141 MMOL/L (ref 134–144)
TRIGL SERPL-MCNC: 82 MG/DL (ref 0–149)
VLDLC SERPL CALC-MCNC: 16 MG/DL (ref 5–40)
WBC # BLD AUTO: 8 X10E3/UL (ref 3.4–10.8)

## 2019-12-19 NOTE — TELEPHONE ENCOUNTER
Mohinder sent fax stating that Butalbital/acetaminophen/caffeine tablets are not covered by patients insurance. Insurance prefers: sumatriptan succinate, naratriptan HCL, or Rizatriptan. Asking for new Rx to be sent to the pharmacy with new medication, strength, directions, quantity and refills.      Thanks,  Corrinne Exon

## 2019-12-27 ENCOUNTER — HOSPITAL ENCOUNTER (OUTPATIENT)
Dept: CT IMAGING | Age: 74
Discharge: HOME | End: 2019-12-27
Attending: FAMILY MEDICINE
Payer: MEDICARE

## 2019-12-27 ENCOUNTER — HOSPITAL ENCOUNTER (OUTPATIENT)
Dept: MAMMOGRAPHY | Age: 74
Discharge: HOME | End: 2019-12-27
Attending: FAMILY MEDICINE
Payer: MEDICARE

## 2019-12-27 DIAGNOSIS — G44.52 NEW DAILY PERSISTENT HEADACHE: ICD-10-CM

## 2019-12-27 DIAGNOSIS — N95.1 MENOPAUSAL SYMPTOM: ICD-10-CM

## 2019-12-27 PROCEDURE — 70450 CT HEAD/BRAIN W/O DYE: CPT

## 2019-12-27 PROCEDURE — 77080 DXA BONE DENSITY AXIAL: CPT

## 2019-12-31 RX ORDER — AMOXICILLIN 500 MG/1
500 CAPSULE ORAL 3 TIMES DAILY
Qty: 42 CAP | Refills: 0 | Status: SHIPPED | OUTPATIENT
Start: 2019-12-31 | End: 2020-01-14

## 2020-01-02 RX ORDER — ALENDRONATE SODIUM 70 MG/1
70 TABLET ORAL
Qty: 13 TAB | Refills: 3 | Status: SHIPPED | OUTPATIENT
Start: 2020-01-02 | End: 2020-12-21 | Stop reason: SDUPTHER

## 2020-10-28 ENCOUNTER — HOSPITAL ENCOUNTER (OUTPATIENT)
Dept: MAMMOGRAPHY | Age: 75
Discharge: HOME | End: 2020-10-28
Attending: FAMILY MEDICINE
Payer: MEDICARE

## 2020-10-28 DIAGNOSIS — Z12.31 SCREENING MAMMOGRAM FOR HIGH-RISK PATIENT: ICD-10-CM

## 2020-10-28 PROCEDURE — 77067 SCR MAMMO BI INCL CAD: CPT

## 2020-12-21 ENCOUNTER — OFFICE VISIT (OUTPATIENT)
Dept: FAMILY MEDICINE CLINIC | Age: 75
End: 2020-12-21
Payer: MEDICARE

## 2020-12-21 VITALS
HEIGHT: 65 IN | BODY MASS INDEX: 21.33 KG/M2 | RESPIRATION RATE: 16 BRPM | HEART RATE: 58 BPM | WEIGHT: 128 LBS | TEMPERATURE: 96.4 F | DIASTOLIC BLOOD PRESSURE: 56 MMHG | SYSTOLIC BLOOD PRESSURE: 107 MMHG

## 2020-12-21 DIAGNOSIS — K21.9 GASTROESOPHAGEAL REFLUX DISEASE, UNSPECIFIED WHETHER ESOPHAGITIS PRESENT: ICD-10-CM

## 2020-12-21 DIAGNOSIS — E78.00 HYPERCHOLESTEROLEMIA: ICD-10-CM

## 2020-12-21 DIAGNOSIS — Z00.00 ENCOUNTER FOR MEDICARE ANNUAL WELLNESS EXAM: Primary | ICD-10-CM

## 2020-12-21 PROCEDURE — G8536 NO DOC ELDER MAL SCRN: HCPCS | Performed by: FAMILY MEDICINE

## 2020-12-21 PROCEDURE — G8420 CALC BMI NORM PARAMETERS: HCPCS | Performed by: FAMILY MEDICINE

## 2020-12-21 PROCEDURE — G8427 DOCREV CUR MEDS BY ELIG CLIN: HCPCS | Performed by: FAMILY MEDICINE

## 2020-12-21 PROCEDURE — 1090F PRES/ABSN URINE INCON ASSESS: CPT | Performed by: FAMILY MEDICINE

## 2020-12-21 PROCEDURE — G8510 SCR DEP NEG, NO PLAN REQD: HCPCS | Performed by: FAMILY MEDICINE

## 2020-12-21 PROCEDURE — 99213 OFFICE O/P EST LOW 20 MIN: CPT | Performed by: FAMILY MEDICINE

## 2020-12-21 PROCEDURE — 1101F PT FALLS ASSESS-DOCD LE1/YR: CPT | Performed by: FAMILY MEDICINE

## 2020-12-21 PROCEDURE — G0439 PPPS, SUBSEQ VISIT: HCPCS | Performed by: FAMILY MEDICINE

## 2020-12-21 PROCEDURE — G8399 PT W/DXA RESULTS DOCUMENT: HCPCS | Performed by: FAMILY MEDICINE

## 2020-12-21 RX ORDER — ATORVASTATIN CALCIUM 20 MG/1
20 TABLET, FILM COATED ORAL DAILY
Qty: 90 TAB | Refills: 3 | Status: SHIPPED | OUTPATIENT
Start: 2020-12-21 | End: 2021-06-02 | Stop reason: SDUPTHER

## 2020-12-21 RX ORDER — BUTALBITAL, ACETAMINOPHEN AND CAFFEINE 50; 325; 40 MG/1; MG/1; MG/1
TABLET ORAL
Qty: 50 TAB | Refills: 1 | Status: SHIPPED | OUTPATIENT
Start: 2020-12-21 | End: 2021-06-02 | Stop reason: SDUPTHER

## 2020-12-21 RX ORDER — ALENDRONATE SODIUM 70 MG/1
70 TABLET ORAL
Qty: 13 TAB | Refills: 3 | Status: SHIPPED | OUTPATIENT
Start: 2020-12-21 | End: 2021-06-22 | Stop reason: SDUPTHER

## 2020-12-21 NOTE — PROGRESS NOTES
HISTORY OF PRESENT ILLNESS  Megan Mckenzie is a 66 y.o. female. HPI in for medicare wellness exam. Needs cholesterol checked. Has a lower abdominal hernia for 3 years, starting to bother her a bit. Not seeing any other doctors. UTD on mammograms. UTD on immunizations  Would want daughter Aaron Nixon to be her mPOA has an advacned directive. Review of Systems   HENT: Negative for hearing loss. Neurological:        No falls, canes. Independent in all adls. Memory ok. Psychiatric/Behavioral: Negative for depression. Very rare glass of wine       Physical Exam  Vitals signs and nursing note reviewed. Constitutional:       Appearance: She is well-developed. HENT:      Right Ear: External ear normal.      Left Ear: External ear normal.   Neck:      Thyroid: No thyromegaly. Cardiovascular:      Rate and Rhythm: Normal rate and regular rhythm. Heart sounds: Normal heart sounds. Pulmonary:      Breath sounds: Normal breath sounds. No wheezing. Abdominal:      General: Bowel sounds are normal. There is no distension. Palpations: Abdomen is soft. There is no mass. Tenderness: There is no abdominal tenderness. Lymphadenopathy:      Cervical: No cervical adenopathy. ASSESSMENT and PLAN  Orders Placed This Encounter    CBC WITH AUTOMATED DIFF    METABOLIC PANEL, COMPREHENSIVE    LIPID PANEL    butalbital-acetaminophen-caffeine (FIORICET, ESGIC) -40 mg per tablet    alendronate (FOSAMAX) 70 mg tablet    atorvastatin (LIPITOR) 20 mg tablet     Diagnoses and all orders for this visit:    1. Encounter for Medicare annual wellness exam    2. Hypercholesterolemia  -     METABOLIC PANEL, COMPREHENSIVE; Future  -     LIPID PANEL; Future    3. Gastroesophageal reflux disease, unspecified whether esophagitis present  -     CBC WITH AUTOMATED DIFF;  Future    Other orders  -     butalbital-acetaminophen-caffeine (FIORICET, ESGIC) -40 mg per tablet; 1-2 tabs po every 6 hours as needed for headache  -     alendronate (FOSAMAX) 70 mg tablet; Take 1 Tab by mouth every seven (7) days. Take with 8 ounces of water on empty stomach. Remain upright for 30 minutes afterwards. For osteoporosis (weak bones)  -     atorvastatin (LIPITOR) 20 mg tablet; Take 1 Tab by mouth daily.  For cholesterol

## 2020-12-22 LAB
ALBUMIN SERPL-MCNC: 4.6 G/DL (ref 3.7–4.7)
ALBUMIN/GLOB SERPL: 1.4 {RATIO} (ref 1.2–2.2)
ALP SERPL-CCNC: 117 IU/L (ref 39–117)
ALT SERPL-CCNC: 19 IU/L (ref 0–32)
AST SERPL-CCNC: 26 IU/L (ref 0–40)
BASOPHILS # BLD AUTO: 0 X10E3/UL (ref 0–0.2)
BASOPHILS NFR BLD AUTO: 1 %
BILIRUB SERPL-MCNC: <0.2 MG/DL (ref 0–1.2)
BUN SERPL-MCNC: 17 MG/DL (ref 8–27)
BUN/CREAT SERPL: 16 (ref 12–28)
CALCIUM SERPL-MCNC: 11.4 MG/DL (ref 8.7–10.3)
CHLORIDE SERPL-SCNC: 99 MMOL/L (ref 96–106)
CHOLEST SERPL-MCNC: 234 MG/DL (ref 100–199)
CO2 SERPL-SCNC: 25 MMOL/L (ref 20–29)
CREAT SERPL-MCNC: 1.07 MG/DL (ref 0.57–1)
EOSINOPHIL # BLD AUTO: 0.1 X10E3/UL (ref 0–0.4)
EOSINOPHIL NFR BLD AUTO: 2 %
ERYTHROCYTE [DISTWIDTH] IN BLOOD BY AUTOMATED COUNT: 13.5 % (ref 11.7–15.4)
GLOBULIN SER CALC-MCNC: 3.2 G/DL (ref 1.5–4.5)
GLUCOSE SERPL-MCNC: 77 MG/DL (ref 65–99)
HCT VFR BLD AUTO: 33.6 % (ref 34–46.6)
HDLC SERPL-MCNC: 75 MG/DL
HGB BLD-MCNC: 11.3 G/DL (ref 11.1–15.9)
IMM GRANULOCYTES # BLD AUTO: 0 X10E3/UL (ref 0–0.1)
IMM GRANULOCYTES NFR BLD AUTO: 0 %
INTERPRETATION, 910389: NORMAL
INTERPRETATION: NORMAL
LDLC SERPL CALC-MCNC: 140 MG/DL (ref 0–99)
LYMPHOCYTES # BLD AUTO: 2.8 X10E3/UL (ref 0.7–3.1)
LYMPHOCYTES NFR BLD AUTO: 33 %
MCH RBC QN AUTO: 31.1 PG (ref 26.6–33)
MCHC RBC AUTO-ENTMCNC: 33.6 G/DL (ref 31.5–35.7)
MCV RBC AUTO: 93 FL (ref 79–97)
MONOCYTES # BLD AUTO: 0.5 X10E3/UL (ref 0.1–0.9)
MONOCYTES NFR BLD AUTO: 6 %
NEUTROPHILS # BLD AUTO: 5 X10E3/UL (ref 1.4–7)
NEUTROPHILS NFR BLD AUTO: 58 %
PDF IMAGE, 910387: NORMAL
PLATELET # BLD AUTO: 244 X10E3/UL (ref 150–450)
POTASSIUM SERPL-SCNC: 5 MMOL/L (ref 3.5–5.2)
PROT SERPL-MCNC: 7.8 G/DL (ref 6–8.5)
RBC # BLD AUTO: 3.63 X10E6/UL (ref 3.77–5.28)
SODIUM SERPL-SCNC: 138 MMOL/L (ref 134–144)
TRIGL SERPL-MCNC: 109 MG/DL (ref 0–149)
VLDLC SERPL CALC-MCNC: 19 MG/DL (ref 5–40)
WBC # BLD AUTO: 8.5 X10E3/UL (ref 3.4–10.8)

## 2020-12-28 DIAGNOSIS — E83.52 HYPERCALCEMIA: ICD-10-CM

## 2020-12-30 LAB
CALCIUM SERPL-MCNC: 9.1 MG/DL (ref 8.7–10.3)
PTH-INTACT SERPL-MCNC: 89 PG/ML (ref 15–65)

## 2021-06-02 ENCOUNTER — OFFICE VISIT (OUTPATIENT)
Dept: FAMILY MEDICINE CLINIC | Age: 76
End: 2021-06-02
Payer: MEDICARE

## 2021-06-02 VITALS
TEMPERATURE: 98.3 F | DIASTOLIC BLOOD PRESSURE: 51 MMHG | RESPIRATION RATE: 16 BRPM | WEIGHT: 131.4 LBS | BODY MASS INDEX: 21.89 KG/M2 | HEIGHT: 65 IN | SYSTOLIC BLOOD PRESSURE: 112 MMHG | HEART RATE: 57 BPM | OXYGEN SATURATION: 100 %

## 2021-06-02 DIAGNOSIS — E78.00 HYPERCHOLESTEROLEMIA: Primary | ICD-10-CM

## 2021-06-02 DIAGNOSIS — D64.9 ANEMIA, UNSPECIFIED TYPE: ICD-10-CM

## 2021-06-02 LAB
ALBUMIN SERPL-MCNC: 4.2 G/DL (ref 3.5–5)
ALBUMIN/GLOB SERPL: 1.1 {RATIO} (ref 1.1–2.2)
ALP SERPL-CCNC: 112 U/L (ref 45–117)
ALT SERPL-CCNC: 27 U/L (ref 12–78)
ANION GAP SERPL CALC-SCNC: 6 MMOL/L (ref 5–15)
AST SERPL-CCNC: 24 U/L (ref 15–37)
BASOPHILS # BLD: 0 K/UL (ref 0–0.1)
BASOPHILS NFR BLD: 1 % (ref 0–1)
BILIRUB SERPL-MCNC: 0.2 MG/DL (ref 0.2–1)
BUN SERPL-MCNC: 18 MG/DL (ref 6–20)
BUN/CREAT SERPL: 18 (ref 12–20)
CALCIUM SERPL-MCNC: 9.5 MG/DL (ref 8.5–10.1)
CHLORIDE SERPL-SCNC: 105 MMOL/L (ref 97–108)
CHOLEST SERPL-MCNC: 242 MG/DL
CO2 SERPL-SCNC: 27 MMOL/L (ref 21–32)
CREAT SERPL-MCNC: 1 MG/DL (ref 0.55–1.02)
DIFFERENTIAL METHOD BLD: ABNORMAL
EOSINOPHIL # BLD: 0.1 K/UL (ref 0–0.4)
EOSINOPHIL NFR BLD: 2 % (ref 0–7)
ERYTHROCYTE [DISTWIDTH] IN BLOOD BY AUTOMATED COUNT: 15.1 % (ref 11.5–14.5)
GLOBULIN SER CALC-MCNC: 3.8 G/DL (ref 2–4)
GLUCOSE SERPL-MCNC: 61 MG/DL (ref 65–100)
HCT VFR BLD AUTO: 33.6 % (ref 35–47)
HDLC SERPL-MCNC: 82 MG/DL
HDLC SERPL: 3 {RATIO} (ref 0–5)
HGB BLD-MCNC: 10.7 G/DL (ref 11.5–16)
IMM GRANULOCYTES # BLD AUTO: 0 K/UL (ref 0–0.04)
IMM GRANULOCYTES NFR BLD AUTO: 0 % (ref 0–0.5)
LDLC SERPL CALC-MCNC: 147.2 MG/DL (ref 0–100)
LYMPHOCYTES # BLD: 2.9 K/UL (ref 0.8–3.5)
LYMPHOCYTES NFR BLD: 40 % (ref 12–49)
MCH RBC QN AUTO: 30.3 PG (ref 26–34)
MCHC RBC AUTO-ENTMCNC: 31.8 G/DL (ref 30–36.5)
MCV RBC AUTO: 95.2 FL (ref 80–99)
MONOCYTES # BLD: 0.5 K/UL (ref 0–1)
MONOCYTES NFR BLD: 6 % (ref 5–13)
NEUTS SEG # BLD: 3.7 K/UL (ref 1.8–8)
NEUTS SEG NFR BLD: 51 % (ref 32–75)
NRBC # BLD: 0 K/UL (ref 0–0.01)
NRBC BLD-RTO: 0 PER 100 WBC
PLATELET # BLD AUTO: 248 K/UL (ref 150–400)
PMV BLD AUTO: 11.8 FL (ref 8.9–12.9)
POTASSIUM SERPL-SCNC: 5.4 MMOL/L (ref 3.5–5.1)
PROT SERPL-MCNC: 8 G/DL (ref 6.4–8.2)
RBC # BLD AUTO: 3.53 M/UL (ref 3.8–5.2)
SODIUM SERPL-SCNC: 138 MMOL/L (ref 136–145)
TRIGL SERPL-MCNC: 64 MG/DL (ref ?–150)
VLDLC SERPL CALC-MCNC: 12.8 MG/DL
WBC # BLD AUTO: 7.2 K/UL (ref 3.6–11)

## 2021-06-02 PROCEDURE — G8399 PT W/DXA RESULTS DOCUMENT: HCPCS | Performed by: FAMILY MEDICINE

## 2021-06-02 PROCEDURE — G8420 CALC BMI NORM PARAMETERS: HCPCS | Performed by: FAMILY MEDICINE

## 2021-06-02 PROCEDURE — G8427 DOCREV CUR MEDS BY ELIG CLIN: HCPCS | Performed by: FAMILY MEDICINE

## 2021-06-02 PROCEDURE — G8536 NO DOC ELDER MAL SCRN: HCPCS | Performed by: FAMILY MEDICINE

## 2021-06-02 PROCEDURE — 1101F PT FALLS ASSESS-DOCD LE1/YR: CPT | Performed by: FAMILY MEDICINE

## 2021-06-02 PROCEDURE — 1090F PRES/ABSN URINE INCON ASSESS: CPT | Performed by: FAMILY MEDICINE

## 2021-06-02 PROCEDURE — 99213 OFFICE O/P EST LOW 20 MIN: CPT | Performed by: FAMILY MEDICINE

## 2021-06-02 PROCEDURE — G8510 SCR DEP NEG, NO PLAN REQD: HCPCS | Performed by: FAMILY MEDICINE

## 2021-06-02 RX ORDER — IBUPROFEN 200 MG
400 TABLET ORAL
Qty: 50 TABLET | Refills: 1
Start: 2021-06-02 | End: 2022-01-12 | Stop reason: ALTCHOICE

## 2021-06-02 RX ORDER — BUTALBITAL, ACETAMINOPHEN AND CAFFEINE 50; 325; 40 MG/1; MG/1; MG/1
TABLET ORAL
Qty: 50 TABLET | Refills: 1 | Status: SHIPPED | OUTPATIENT
Start: 2021-06-02 | End: 2021-07-30 | Stop reason: SDUPTHER

## 2021-06-02 RX ORDER — ATORVASTATIN CALCIUM 20 MG/1
20 TABLET, FILM COATED ORAL DAILY
Qty: 90 TABLET | Refills: 3 | Status: SHIPPED | OUTPATIENT
Start: 2021-06-02 | End: 2021-07-30 | Stop reason: SDUPTHER

## 2021-06-02 NOTE — PROGRESS NOTES
HISTORY OF PRESENT ILLNESS  Luis Eduardo Meyer is a 66 y.o. female. HPI In for cholesterol check. Not having to take any pain meds for legs. Some swelling and pain. Some tingling in fingertips of both hands. No nighttime awakening. No complaints of chest pain, shortness of breath, TIAs, claudication or edema. Has had covid vaccine. ROS    Physical Exam  Vitals and nursing note reviewed. Constitutional:       Appearance: She is well-developed. HENT:      Right Ear: External ear normal.      Left Ear: External ear normal.   Neck:      Thyroid: No thyromegaly. Cardiovascular:      Rate and Rhythm: Normal rate and regular rhythm. Heart sounds: Normal heart sounds. Pulmonary:      Breath sounds: Normal breath sounds. No wheezing. Abdominal:      General: Bowel sounds are normal. There is no distension. Palpations: Abdomen is soft. There is no mass. Tenderness: There is no abdominal tenderness. Lymphadenopathy:      Cervical: No cervical adenopathy. ASSESSMENT and PLAN  Orders Placed This Encounter    CBC WITH AUTOMATED DIFF    METABOLIC PANEL, COMPREHENSIVE    LIPID PANEL    PROTEIN ELECTROPHORESIS    ibuprofen (AdviL) 200 mg tablet    butalbital-acetaminophen-caffeine (FIORICET, ESGIC) -40 mg per tablet    atorvastatin (LIPITOR) 20 mg tablet     Diagnoses and all orders for this visit:    1. Hypercholesterolemia  -     METABOLIC PANEL, COMPREHENSIVE; Future  -     LIPID PANEL; Future    2. Anemia, unspecified type  -     CBC WITH AUTOMATED DIFF; Future  -     PROTEIN ELECTROPHORESIS; Future    Other orders  -     ibuprofen (AdviL) 200 mg tablet; Take 2 Tablets by mouth every eight (8) hours as needed for Pain.  -     butalbital-acetaminophen-caffeine (FIORICET, ESGIC) -40 mg per tablet; 1-2 tabs po every 6 hours as needed for headache  -     atorvastatin (LIPITOR) 20 mg tablet; Take 1 Tablet by mouth daily.  For cholesterol      Follow-up and Dispositions · Return in about 6 months (around 12/2/2021).

## 2021-06-02 NOTE — PROGRESS NOTES
Chief Complaint   Patient presents with    Follow-up    Headache     1. Have you been to the ER, urgent care clinic since your last visit? Hospitalized since your last visit? No    2. Have you seen or consulted any other health care providers outside of the [address] [place] [address] since your last visit? Include any pap smears or colon screening.  No

## 2021-06-04 LAB
ALBUMIN SERPL ELPH-MCNC: 4.1 G/DL (ref 2.9–4.4)
ALBUMIN/GLOB SERPL: 1.1 {RATIO} (ref 0.7–1.7)
ALPHA1 GLOB SERPL ELPH-MCNC: 0.3 G/DL (ref 0–0.4)
ALPHA2 GLOB SERPL ELPH-MCNC: 0.9 G/DL (ref 0.4–1)
B-GLOBULIN SERPL ELPH-MCNC: 1 G/DL (ref 0.7–1.3)
GAMMA GLOB SERPL ELPH-MCNC: 1.6 G/DL (ref 0.4–1.8)
GLOBULIN SER CALC-MCNC: 3.7 G/DL (ref 2.2–3.9)
M PROTEIN SERPL ELPH-MCNC: NORMAL G/DL
PROT SERPL-MCNC: 7.8 G/DL (ref 6–8.5)

## 2021-06-22 RX ORDER — ALENDRONATE SODIUM 70 MG/1
70 TABLET ORAL
Qty: 13 TABLET | Refills: 3 | Status: SHIPPED | OUTPATIENT
Start: 2021-06-22 | End: 2021-07-30 | Stop reason: SDUPTHER

## 2021-07-30 RX ORDER — ATORVASTATIN CALCIUM 20 MG/1
20 TABLET, FILM COATED ORAL DAILY
Qty: 90 TABLET | Refills: 3 | Status: ON HOLD | OUTPATIENT
Start: 2021-07-30 | End: 2021-12-30 | Stop reason: SDUPTHER

## 2021-07-30 RX ORDER — BUTALBITAL, ACETAMINOPHEN AND CAFFEINE 50; 325; 40 MG/1; MG/1; MG/1
TABLET ORAL
Qty: 50 TABLET | Refills: 1 | Status: SHIPPED | OUTPATIENT
Start: 2021-07-30

## 2021-07-30 RX ORDER — ALENDRONATE SODIUM 70 MG/1
70 TABLET ORAL
Qty: 13 TABLET | Refills: 3 | Status: SHIPPED | OUTPATIENT
Start: 2021-07-30 | End: 2021-12-23 | Stop reason: ALTCHOICE

## 2021-07-30 NOTE — TELEPHONE ENCOUNTER
Last visit: 21  Next visit:21  Previous refill 21(90 day supply+ Refills)    Requested Prescriptions     Pending Prescriptions Disp Refills    alendronate (FOSAMAX) 70 mg tablet 13 Tablet 3     Sig: Take 1 Tablet by mouth every seven (7) days. Take with 8 ounces of water on empty stomach. Remain upright for 30 minutes afterwards. For osteoporosis (weak bones)    atorvastatin (LIPITOR) 20 mg tablet 90 Tablet 3     Sig: Take 1 Tablet by mouth daily.  For cholesterol    butalbital-acetaminophen-caffeine (FIORICET, ESGIC) -40 mg per tablet 50 Tablet 1     Si-2 tabs po every 6 hours as needed for headache

## 2021-09-01 ENCOUNTER — TELEPHONE (OUTPATIENT)
Dept: FAMILY MEDICINE CLINIC | Age: 76
End: 2021-09-01

## 2021-09-01 NOTE — TELEPHONE ENCOUNTER
Patient is calling, because she needs a referral to a [employer] Dr. Bogdan Akers. Lester Tomas (Phone #[phone]. )  Please call [phone].

## 2021-09-09 NOTE — TELEPHONE ENCOUNTER
Per referral coordinator Junior Naidu no insurance referral is needed. Left Message for patient with above info.

## 2021-10-17 NOTE — DISCHARGE INSTRUCTIONS
Alicia Collazo  [id number]  1945      MANOMETRY DISCHARGE INSTRUCTION    You may resume your regular diet as tolerated. You may resume your normal daily activities. If you develop a sore throat- throat lozenges or warm salt water gargles will help. Call your Physician if you have any complications or questions. [email or web address] Activation    Thank you for requesting access to [facility]. Please follow the instructions below to securely access and download your online medical record. [facility] allows you to send messages to your doctor, view your test results, renew your prescriptions, schedule appointments, and more. How Do I Sign Up? 1. In your internet browser, go to [email or web address].[employer]  2. Click on the First Time User? Click Here link in the Sign In box. You will be redirect to the New Member Sign Up page. 3. Enter your [facility] Access Code exactly as it appears below. You will not need to use this code after youve completed the sign-up process. If you do not sign up before the expiration date, you must request a new code. [facility] Access Code: [email or web address]  Expires: 2017  4:40 PM (This is the date your [facility] access code will )    4. Enter the last four digits of your Social Security Number (xxxx) and Date of Birth (mm/dd/yyyy) as indicated and click Submit. You will be taken to the next sign-up page. 5. Create a [facility] ID. This will be your [facility] login ID and cannot be changed, so think of one that is secure and easy to remember. 6. Create a [facility] password. You can change your password at any time. 7. Enter your Password Reset Question and Answer. This can be used at a later time if you forget your password. 8. Enter your e-mail address. You will receive e-mail notification when new information is available in [address] 9. Click Sign Up. You can now view and download portions of your medical record.   10. Click the Download Summary menu link to download a portable copy of your medical information. Additional Information    If you have questions, please visit the Frequently Asked Questions section of the [employer] website at [email or web address] Remember, [employer] is NOT to be used for urgent needs. For medical emergencies, dial 911.

## 2021-11-03 ENCOUNTER — OFFICE VISIT (OUTPATIENT)
Dept: FAMILY MEDICINE CLINIC | Age: 76
End: 2021-11-03
Payer: MEDICARE

## 2021-11-03 VITALS
HEIGHT: 65 IN | RESPIRATION RATE: 16 BRPM | OXYGEN SATURATION: 100 % | DIASTOLIC BLOOD PRESSURE: 60 MMHG | WEIGHT: 138.8 LBS | SYSTOLIC BLOOD PRESSURE: 102 MMHG | HEART RATE: 60 BPM | BODY MASS INDEX: 23.13 KG/M2 | TEMPERATURE: 98 F

## 2021-11-03 DIAGNOSIS — Z23 NEEDS FLU SHOT: ICD-10-CM

## 2021-11-03 DIAGNOSIS — Z23 ENCOUNTER FOR IMMUNIZATION: ICD-10-CM

## 2021-11-03 DIAGNOSIS — Z12.31 SCREENING MAMMOGRAM, ENCOUNTER FOR: ICD-10-CM

## 2021-11-03 DIAGNOSIS — H25.013 CORTICAL AGE-RELATED CATARACT OF BOTH EYES: Primary | ICD-10-CM

## 2021-11-03 PROCEDURE — G8427 DOCREV CUR MEDS BY ELIG CLIN: HCPCS | Performed by: FAMILY MEDICINE

## 2021-11-03 PROCEDURE — G8420 CALC BMI NORM PARAMETERS: HCPCS | Performed by: FAMILY MEDICINE

## 2021-11-03 PROCEDURE — G8536 NO DOC ELDER MAL SCRN: HCPCS | Performed by: FAMILY MEDICINE

## 2021-11-03 PROCEDURE — 99213 OFFICE O/P EST LOW 20 MIN: CPT | Performed by: FAMILY MEDICINE

## 2021-11-03 PROCEDURE — 1101F PT FALLS ASSESS-DOCD LE1/YR: CPT | Performed by: FAMILY MEDICINE

## 2021-11-03 PROCEDURE — 1090F PRES/ABSN URINE INCON ASSESS: CPT | Performed by: FAMILY MEDICINE

## 2021-11-03 PROCEDURE — 90694 VACC AIIV4 NO PRSRV 0.5ML IM: CPT | Performed by: FAMILY MEDICINE

## 2021-11-03 PROCEDURE — G8399 PT W/DXA RESULTS DOCUMENT: HCPCS | Performed by: FAMILY MEDICINE

## 2021-11-03 PROCEDURE — G8510 SCR DEP NEG, NO PLAN REQD: HCPCS | Performed by: FAMILY MEDICINE

## 2021-11-03 PROCEDURE — G0008 ADMIN INFLUENZA VIRUS VAC: HCPCS | Performed by: FAMILY MEDICINE

## 2021-11-03 RX ORDER — PREDNISOLONE ACETATE 10 MG/ML
SUSPENSION/ DROPS OPHTHALMIC
COMMUNITY
Start: 2021-09-27 | End: 2022-07-12 | Stop reason: ALTCHOICE

## 2021-11-03 RX ORDER — OFLOXACIN 3 MG/ML
SOLUTION/ DROPS OPHTHALMIC
COMMUNITY
Start: 2021-09-27 | End: 2022-07-12 | Stop reason: ALTCHOICE

## 2021-11-03 RX ORDER — KETOROLAC TROMETHAMINE 5 MG/ML
SOLUTION OPHTHALMIC
COMMUNITY
Start: 2021-09-27 | End: 2022-07-12 | Stop reason: ALTCHOICE

## 2021-11-03 NOTE — PROGRESS NOTES
No chief complaint on file. 1. Have you been to the ER, urgent care clinic since your last visit? Hospitalized since your last visit? No    2. Have you seen or consulted any other health care providers outside of the [address] [place] [address] since your last visit? Include any pap smears or colon screening. [facility] surgery on 11/30/21     Verbal Order received from Dr. Diogenes Galloway  for fluad given IM  in left arm.

## 2021-11-03 NOTE — PROGRESS NOTES
HISTORY OF PRESENT ILLNESS  Melvi Bedolla is a 64 y.o. female. HPIIn for preop cataract exam. Has had covid shots. Overall is doing well, has difficulty seeing at night    ROS    Physical Exam  Vitals and nursing note reviewed. Constitutional:       Appearance: She is well-developed. HENT:      Right Ear: External ear normal.      Left Ear: External ear normal.   Eyes:      Comments: Bilateral cataracts   Neck:      Thyroid: No thyromegaly. Cardiovascular:      Rate and Rhythm: Normal rate and regular rhythm. Heart sounds: Normal heart sounds. Pulmonary:      Breath sounds: Normal breath sounds. No wheezing. Abdominal:      General: Bowel sounds are normal. There is no distension. Palpations: Abdomen is soft. There is no mass. Tenderness: There is no abdominal tenderness. Lymphadenopathy:      Cervical: No cervical adenopathy. ASSESSMENT and PLAN  Orders Placed This Encounter    [facility] MAMMO BI SCREENING INCL CAD     Standing Status:   Future     Standing Expiration Date:   12/3/2022    Influenza Vaccine, QUAD, 65 Yrs +  IM  (Fluad 47739 )    Administration fee () for Medicare insured patients     Orders Placed This Encounter    [facility] MAMMO BI SCREENING INCL CAD    Influenza Vaccine, QUAD, 65 Yrs +  IM  ([place] )    Administration fee () for Medicare insured patients     Diagnoses and all orders for this visit:    1. Cortical age-related cataract of both eyes    2. Encounter for immunization  -     ADMIN INFLUENZA VIRUS VAC    3. Needs flu shot  -     FLU (FLUAD QUAD INFLUENZA VACCINE,QUAD,ADJUVANTED)    4. Screening mammogram, encounter for  -     [facility] MAMMO BI SCREENING INCL CAD;  Future

## 2021-11-03 NOTE — PATIENT INSTRUCTIONS
Vaccine Information Statement Influenza (Flu) Vaccine (Inactivated or Recombinant): What You Need to Know Many vaccine information statements are available in Syriac and other languages. See www.immunize.org/vis. Hojas de información sobre vacunas están disponibles en español y en muchos otros idiomas. Visite www.immunize.org/vis. 1. Why get vaccinated? Influenza vaccine can prevent influenza (flu). Flu is a contagious disease that spreads around the [facility] every year, usually between October and May. Anyone can get the flu, but it is more dangerous for some people. Infants and young children, people 72 years and older, pregnant people, and people with certain health conditions or a weakened immune system are at greatest risk of flu complications. Pneumonia, bronchitis, sinus infections, and ear infections are examples of flu-related complications. If you have a medical condition, such as heart disease, cancer, or diabetes, flu can make it worse. Flu can cause fever and chills, sore throat, muscle aches, fatigue, cough, headache, and runny or stuffy nose. Some people may have vomiting and diarrhea, though this is more common in children than adults. In an average year, thousands of people in the [facility] die from flu, and many more are hospitalized. Flu vaccine prevents millions of illnesses and flu-related visits to the doctor each year. 2. Influenza vaccines CDC recommends everyone 6 months and older get vaccinated every flu season. Children 6 months through 6years of age may need 2 doses during a single flu season. Everyone else needs only 1 dose each flu season. It takes about 2 weeks for protection to develop after vaccination. There are many flu viruses, and they are always changing. Each year a new flu vaccine is made to protect against the influenza viruses believed to be likely to cause disease in the upcoming flu season.  Even when the vaccine doesnt exactly match these viruses, it may still provide some protection. Influenza vaccine does not cause flu. Influenza vaccine may be given at the same time as other vaccines. 3. Talk with your health care provider Tell your vaccination provider if the person getting the vaccine: 
 Has had an allergic reaction after a previous dose of influenza vaccine, or has any severe, life-threatening allergies  Has ever had Guillain-Barré Syndrome (also called GBS) In some cases, your health care provider may decide to postpone influenza vaccination until a future visit. Influenza vaccine can be administered at any time during pregnancy. People who are or will be pregnant during influenza season should receive inactivated influenza vaccine. People with minor illnesses, such as a cold, may be vaccinated. People who are moderately or severely ill should usually wait until they recover before getting influenza vaccine. Your health care provider can give you more information. 4. Risks of a vaccine reaction  Soreness, redness, and swelling where the shot is given, fever, muscle aches, and headache can happen after influenza vaccination.  There may be a very small increased risk of Guillain-Barré Syndrome (GBS) after inactivated influenza vaccine (the flu shot). Jerardo Messer children who get the flu shot along with pneumococcal vaccine (PCV13) and/or DTaP vaccine at the same time might be slightly more likely to have a seizure caused by fever. Tell your health care provider if a child who is getting flu vaccine has ever had a seizure. People sometimes faint after medical procedures, including vaccination. Tell your provider if you feel dizzy or have vision changes or ringing in the ears. As with any medicine, there is a very remote chance of a vaccine causing a severe allergic reaction, other serious injury, or death. 5. What if there is a serious problem?  
 
An allergic reaction could occur after the vaccinated person leaves the clinic. If you see signs of a severe allergic reaction (hives, swelling of the face and throat, difficulty breathing, a fast heartbeat, dizziness, or weakness), call 9-1-1 and get the person to the nearest hospital. 
 
For other signs that concern you, call your health care provider. Adverse reactions should be reported to the Vaccine Adverse Event Reporting System (VAERS). Your health care provider will usually file this report, or you can do it yourself. Visit the VAERS website at www.vaers. [facility].gov or call 5-798.272.5527. VAERS is only for reporting reactions, and VAERS staff members do not give medical advice. 6. The National Vaccine Injury Compensation Program 
 
The [facility] Vaccine Injury Compensation Program (VICP) is a federal program that was created to compensate people who may have been injured by certain vaccines. Claims regarding alleged injury or death due to vaccination have a time limit for filing, which may be as short as two years. Visit the VICP website at www.[facility].gov/vaccinecompensation or call 7-157.352.6608 to learn about the program and about filing a claim. 7. How can I learn more?  Ask your health care provider.  Call your local or state health department.  Visit the website of the Food and Drug Administration (FDA) for vaccine package inserts and additional information at www.fda.gov/vaccines-blood-biologics/vaccines.  Contact the Centers for Disease Control and Prevention (CDC): 
- Call 5-213.735.5182 (1-800-CDC-INFO) or 
- Visit CDCs influenza website at www.cdc.gov/flu. Vaccine Information Statement Inactivated Influenza Vaccine 8/6/2021 
42 AURELIA Agustin 599LH-17 Department of Health and [employer] Centers for Disease Control and Prevention Office Use Only

## 2021-12-16 ENCOUNTER — HOSPITAL ENCOUNTER (OUTPATIENT)
Dept: MAMMOGRAPHY | Age: 76
Discharge: HOME | End: 2021-12-16
Attending: FAMILY MEDICINE
Payer: MEDICARE

## 2021-12-16 DIAGNOSIS — Z12.31 SCREENING MAMMOGRAM, ENCOUNTER FOR: ICD-10-CM

## 2021-12-16 PROCEDURE — 77067 SCR MAMMO BI INCL CAD: CPT

## 2021-12-23 ENCOUNTER — OFFICE VISIT (OUTPATIENT)
Dept: FAMILY MEDICINE CLINIC | Age: 76
End: 2021-12-23
Payer: MEDICARE

## 2021-12-23 VITALS
BODY MASS INDEX: 22.66 KG/M2 | TEMPERATURE: 96.9 F | HEART RATE: 61 BPM | DIASTOLIC BLOOD PRESSURE: 53 MMHG | WEIGHT: 136 LBS | OXYGEN SATURATION: 97 % | SYSTOLIC BLOOD PRESSURE: 134 MMHG | RESPIRATION RATE: 16 BRPM | HEIGHT: 65 IN

## 2021-12-23 DIAGNOSIS — Z00.00 ENCOUNTER FOR MEDICARE ANNUAL WELLNESS EXAM: Primary | ICD-10-CM

## 2021-12-23 DIAGNOSIS — E78.00 HYPERCHOLESTEROLEMIA: ICD-10-CM

## 2021-12-23 LAB
ALBUMIN SERPL-MCNC: 4 G/DL (ref 3.5–5)
ALBUMIN/GLOB SERPL: 1.1 {RATIO} (ref 1.1–2.2)
ALP SERPL-CCNC: 124 U/L (ref 45–117)
ALT SERPL-CCNC: 42 U/L (ref 12–78)
ANION GAP SERPL CALC-SCNC: 2 MMOL/L (ref 5–15)
AST SERPL-CCNC: 30 U/L (ref 15–37)
BASOPHILS # BLD: 0.1 K/UL (ref 0–0.1)
BASOPHILS NFR BLD: 1 % (ref 0–1)
BILIRUB SERPL-MCNC: 0.2 MG/DL (ref 0.2–1)
BUN SERPL-MCNC: 23 MG/DL (ref 6–20)
BUN/CREAT SERPL: 19 (ref 12–20)
CALCIUM SERPL-MCNC: 9.9 MG/DL (ref 8.5–10.1)
CHLORIDE SERPL-SCNC: 108 MMOL/L (ref 97–108)
CHOLEST SERPL-MCNC: 210 MG/DL
CO2 SERPL-SCNC: 26 MMOL/L (ref 21–32)
CREAT SERPL-MCNC: 1.24 MG/DL (ref 0.55–1.02)
DIFFERENTIAL METHOD BLD: ABNORMAL
EOSINOPHIL # BLD: 0.2 K/UL (ref 0–0.4)
EOSINOPHIL NFR BLD: 3 % (ref 0–7)
ERYTHROCYTE [DISTWIDTH] IN BLOOD BY AUTOMATED COUNT: 14.9 % (ref 11.5–14.5)
GLOBULIN SER CALC-MCNC: 3.8 G/DL (ref 2–4)
GLUCOSE SERPL-MCNC: 79 MG/DL (ref 65–100)
HCT VFR BLD AUTO: 32 % (ref 35–47)
HDLC SERPL-MCNC: 73 MG/DL
HDLC SERPL: 2.9 {RATIO} (ref 0–5)
HGB BLD-MCNC: 10.3 G/DL (ref 11.5–16)
IMM GRANULOCYTES # BLD AUTO: 0 K/UL (ref 0–0.04)
IMM GRANULOCYTES NFR BLD AUTO: 0 % (ref 0–0.5)
LDLC SERPL CALC-MCNC: 113.4 MG/DL (ref 0–100)
LYMPHOCYTES # BLD: 3.2 K/UL (ref 0.8–3.5)
LYMPHOCYTES NFR BLD: 43 % (ref 12–49)
MCH RBC QN AUTO: 31 PG (ref 26–34)
MCHC RBC AUTO-ENTMCNC: 32.2 G/DL (ref 30–36.5)
MCV RBC AUTO: 96.4 FL (ref 80–99)
MONOCYTES # BLD: 0.4 K/UL (ref 0–1)
MONOCYTES NFR BLD: 5 % (ref 5–13)
NEUTS SEG # BLD: 3.6 K/UL (ref 1.8–8)
NEUTS SEG NFR BLD: 48 % (ref 32–75)
NRBC # BLD: 0 K/UL (ref 0–0.01)
NRBC BLD-RTO: 0 PER 100 WBC
PLATELET # BLD AUTO: 184 K/UL (ref 150–400)
PMV BLD AUTO: 12.4 FL (ref 8.9–12.9)
POTASSIUM SERPL-SCNC: 5.5 MMOL/L (ref 3.5–5.1)
PROT SERPL-MCNC: 7.8 G/DL (ref 6.4–8.2)
RBC # BLD AUTO: 3.32 M/UL (ref 3.8–5.2)
SODIUM SERPL-SCNC: 136 MMOL/L (ref 136–145)
TRIGL SERPL-MCNC: 118 MG/DL (ref ?–150)
VLDLC SERPL CALC-MCNC: 23.6 MG/DL
WBC # BLD AUTO: 7.4 K/UL (ref 3.6–11)

## 2021-12-23 PROCEDURE — G0439 PPPS, SUBSEQ VISIT: HCPCS | Performed by: FAMILY MEDICINE

## 2021-12-23 PROCEDURE — 99213 OFFICE O/P EST LOW 20 MIN: CPT | Performed by: FAMILY MEDICINE

## 2021-12-23 PROCEDURE — G8399 PT W/DXA RESULTS DOCUMENT: HCPCS | Performed by: FAMILY MEDICINE

## 2021-12-23 PROCEDURE — 1101F PT FALLS ASSESS-DOCD LE1/YR: CPT | Performed by: FAMILY MEDICINE

## 2021-12-23 PROCEDURE — G8510 SCR DEP NEG, NO PLAN REQD: HCPCS | Performed by: FAMILY MEDICINE

## 2021-12-23 PROCEDURE — 1090F PRES/ABSN URINE INCON ASSESS: CPT | Performed by: FAMILY MEDICINE

## 2021-12-23 PROCEDURE — G8536 NO DOC ELDER MAL SCRN: HCPCS | Performed by: FAMILY MEDICINE

## 2021-12-23 PROCEDURE — G8427 DOCREV CUR MEDS BY ELIG CLIN: HCPCS | Performed by: FAMILY MEDICINE

## 2021-12-23 PROCEDURE — G8420 CALC BMI NORM PARAMETERS: HCPCS | Performed by: FAMILY MEDICINE

## 2021-12-23 NOTE — PROGRESS NOTES
HISTORY OF PRESENT ILLNESS  Ann Epley is a 64 y.o. female. HPI In for medicare wellness exam. Alendronate causes tingling, dizziness. Still having headaches, mainly in L frontal area. No focal neuro symptoms. hadnt had one for several months, then had one this am at [facility] Dr. Kareem Loera (ophthalmology). Had a mammogram last week. UTD on immunizations. Would want her daughter Rocael Grove to be her mPOA if she became incapacitated. Has quit smoking. Review of Systems   HENT: Negative for hearing loss. Neurological:        No falls, assistive devices. Independent in all adls. Memory ok . Psychiatric/Behavioral: Negative for depression. Very rare alcoholic drink. Physical Exam  Vitals and nursing note reviewed. Constitutional:       Appearance: She is well-developed. HENT:      Right Ear: External ear normal.      Left Ear: External ear normal.   Neck:      Thyroid: No thyromegaly. Cardiovascular:      Rate and Rhythm: Normal rate and regular rhythm. Heart sounds: Normal heart sounds. Pulmonary:      Breath sounds: Normal breath sounds. No wheezing. Abdominal:      General: Bowel sounds are normal. There is no distension. Palpations: Abdomen is soft. There is no mass. Tenderness: There is no abdominal tenderness. Lymphadenopathy:      Cervical: No cervical adenopathy. ASSESSMENT and PLAN  Orders Placed This Encounter    METABOLIC PANEL, COMPREHENSIVE    LIPID PANEL    CBC WITH AUTOMATED DIFF     Diagnoses and all orders for this visit:    1. Encounter for Medicare annual wellness exam    2. Hypercholesterolemia  -     METABOLIC PANEL, COMPREHENSIVE; Future  -     LIPID PANEL; Future  -     CBC WITH AUTOMATED DIFF; Future      Follow-up and Dispositions    · Return in about 1 year (around 12/23/2022).

## 2021-12-23 NOTE — PROGRESS NOTES
Identified pt with two pt identifiers(name and ). Reviewed record in preparation for visit and have obtained necessary documentation. Chief Complaint   Patient presents with    Complete Physical    Medication Evaluation     pt feels her fosamax is causing dizziness and numbness/tingling in fingers, weakness, leg pain, stomach pain        Vitals:    21 0904   BP: (!) 134/53   Pulse: 61   Resp: 16   Temp: 96.9 °F (36.1 °C)   TempSrc: Oral   SpO2: 97%   Weight: 136 lb (61.7 kg)   Height: 5' 5\" (1.651 m)   PainSc:   0 - No pain       Health Maintenance Due   Topic    Hepatitis C Screening     Shingrix Vaccine Age 49> (1 of 2)    Medicare Yearly Exam        Coordination of Care Questionnaire:  :   1) Have you been to an emergency room, urgent care, or hospitalized since your last visit? If yes, where when, and reason for visit? no       2. Have seen or consulted any other health care provider since your last visit? If yes, where when, and reason for visit? NO      Patient is accompanied by self I have received verbal consent from Reid Libman to discuss any/all medical information while they are present in the room.

## 2021-12-28 ENCOUNTER — APPOINTMENT (OUTPATIENT)
Dept: CT IMAGING | Age: 76
DRG: 103 | End: 2021-12-28
Attending: EMERGENCY MEDICINE
Payer: MEDICARE

## 2021-12-28 LAB
BASOPHILS # BLD: 0 K/UL (ref 0–0.1)
BASOPHILS NFR BLD: 1 % (ref 0–1)
DIFFERENTIAL METHOD BLD: ABNORMAL
EOSINOPHIL # BLD: 0.2 K/UL (ref 0–0.4)
EOSINOPHIL NFR BLD: 3 % (ref 0–7)
ERYTHROCYTE [DISTWIDTH] IN BLOOD BY AUTOMATED COUNT: 14.3 % (ref 11.5–14.5)
HCT VFR BLD AUTO: 29.3 % (ref 35–47)
HGB BLD-MCNC: 9.7 G/DL (ref 11.5–16)
IMM GRANULOCYTES # BLD AUTO: 0 K/UL (ref 0–0.04)
IMM GRANULOCYTES NFR BLD AUTO: 0 % (ref 0–0.5)
LYMPHOCYTES # BLD: 3.1 K/UL (ref 0.8–3.5)
LYMPHOCYTES NFR BLD: 42 % (ref 12–49)
MCH RBC QN AUTO: 30.6 PG (ref 26–34)
MCHC RBC AUTO-ENTMCNC: 33.1 G/DL (ref 30–36.5)
MCV RBC AUTO: 92.4 FL (ref 80–99)
MONOCYTES # BLD: 0.5 K/UL (ref 0–1)
MONOCYTES NFR BLD: 7 % (ref 5–13)
NEUTS SEG # BLD: 3.5 K/UL (ref 1.8–8)
NEUTS SEG NFR BLD: 47 % (ref 32–75)
NRBC # BLD: 0 K/UL (ref 0–0.01)
NRBC BLD-RTO: 0 PER 100 WBC
PLATELET # BLD AUTO: 171 K/UL (ref 150–400)
PMV BLD AUTO: 11.2 FL (ref 8.9–12.9)
RBC # BLD AUTO: 3.17 M/UL (ref 3.8–5.2)
WBC # BLD AUTO: 7.4 K/UL (ref 3.6–11)

## 2021-12-28 PROCEDURE — 80053 COMPREHEN METABOLIC PANEL: CPT

## 2021-12-28 PROCEDURE — 70450 CT HEAD/BRAIN W/O DYE: CPT

## 2021-12-28 PROCEDURE — 96374 THER/PROPH/DIAG INJ IV PUSH: CPT

## 2021-12-28 PROCEDURE — 99284 EMERGENCY DEPT VISIT MOD MDM: CPT

## 2021-12-28 PROCEDURE — 85025 COMPLETE CBC W/AUTO DIFF WBC: CPT

## 2021-12-29 ENCOUNTER — HOSPITAL ENCOUNTER (INPATIENT)
Age: 76
LOS: 1 days | Discharge: HOME | DRG: 103 | End: 2021-12-30
Attending: EMERGENCY MEDICINE | Admitting: HOSPITALIST
Payer: MEDICARE

## 2021-12-29 ENCOUNTER — APPOINTMENT (OUTPATIENT)
Dept: NON INVASIVE DIAGNOSTICS | Age: 76
DRG: 103 | End: 2021-12-29
Attending: HOSPITALIST
Payer: MEDICARE

## 2021-12-29 ENCOUNTER — APPOINTMENT (OUTPATIENT)
Dept: CT IMAGING | Age: 76
DRG: 103 | End: 2021-12-29
Attending: EMERGENCY MEDICINE
Payer: MEDICARE

## 2021-12-29 ENCOUNTER — APPOINTMENT (OUTPATIENT)
Dept: MRI IMAGING | Age: 76
DRG: 103 | End: 2021-12-29
Attending: EMERGENCY MEDICINE
Payer: MEDICARE

## 2021-12-29 DIAGNOSIS — E78.00 HYPERCHOLESTEROLEMIA: ICD-10-CM

## 2021-12-29 DIAGNOSIS — R51.9 SEVERE FRONTAL HEADACHES: Primary | ICD-10-CM

## 2021-12-29 DIAGNOSIS — J32.3 SPHENOID SINUSITIS, UNSPECIFIED CHRONICITY: ICD-10-CM

## 2021-12-29 DIAGNOSIS — R51.9 SUDDEN ONSET OF SEVERE HEADACHE: ICD-10-CM

## 2021-12-29 DIAGNOSIS — I65.03 STENOSIS OF BOTH VERTEBRAL ARTERIES: ICD-10-CM

## 2021-12-29 LAB
ALBUMIN SERPL-MCNC: 3.6 G/DL (ref 3.5–5)
ALBUMIN/GLOB SERPL: 1 {RATIO} (ref 1.1–2.2)
ALP SERPL-CCNC: 105 U/L (ref 45–117)
ALT SERPL-CCNC: 27 U/L (ref 12–78)
ANION GAP SERPL CALC-SCNC: 4 MMOL/L (ref 5–15)
AST SERPL-CCNC: 23 U/L (ref 15–37)
BILIRUB SERPL-MCNC: 0.1 MG/DL (ref 0.2–1)
BUN SERPL-MCNC: 23 MG/DL (ref 6–20)
BUN/CREAT SERPL: 22 (ref 12–20)
CALCIUM SERPL-MCNC: 8.9 MG/DL (ref 8.5–10.1)
CHLORIDE SERPL-SCNC: 101 MMOL/L (ref 97–108)
CO2 SERPL-SCNC: 27 MMOL/L (ref 21–32)
COVID-19 RAPID TEST, COVR: NOT DETECTED
CREAT SERPL-MCNC: 1.04 MG/DL (ref 0.55–1.02)
GLOBULIN SER CALC-MCNC: 3.7 G/DL (ref 2–4)
GLUCOSE SERPL-MCNC: 78 MG/DL (ref 65–100)
POTASSIUM SERPL-SCNC: 5.3 MMOL/L (ref 3.5–5.1)
PROT SERPL-MCNC: 7.3 G/DL (ref 6.4–8.2)
SARS-COV-2, COV2: NORMAL
SODIUM SERPL-SCNC: 132 MMOL/L (ref 136–145)
SOURCE, COVRS: NORMAL

## 2021-12-29 PROCEDURE — 74011250637 HC RX REV CODE- 250/637: Performed by: HOSPITALIST

## 2021-12-29 PROCEDURE — 92610 EVALUATE SWALLOWING FUNCTION: CPT | Performed by: SPEECH-LANGUAGE PATHOLOGIST

## 2021-12-29 PROCEDURE — U0005 INFEC AGEN DETEC AMPLI PROBE: HCPCS

## 2021-12-29 PROCEDURE — 74011250636 HC RX REV CODE- 250/636: Performed by: EMERGENCY MEDICINE

## 2021-12-29 PROCEDURE — 65660000000 HC RM CCU STEPDOWN

## 2021-12-29 PROCEDURE — 74011250636 HC RX REV CODE- 250/636: Performed by: FAMILY MEDICINE

## 2021-12-29 PROCEDURE — 74011000636 HC RX REV CODE- 636: Performed by: EMERGENCY MEDICINE

## 2021-12-29 PROCEDURE — 70496 CT ANGIOGRAPHY HEAD: CPT

## 2021-12-29 PROCEDURE — 70544 MR ANGIOGRAPHY HEAD W/O DYE: CPT

## 2021-12-29 PROCEDURE — 87635 SARS-COV-2 COVID-19 AMP PRB: CPT

## 2021-12-29 PROCEDURE — A9576 INJ PROHANCE MULTIPACK: HCPCS | Performed by: FAMILY MEDICINE

## 2021-12-29 PROCEDURE — 74011250637 HC RX REV CODE- 250/637: Performed by: FAMILY MEDICINE

## 2021-12-29 PROCEDURE — 74011250636 HC RX REV CODE- 250/636: Performed by: HOSPITALIST

## 2021-12-29 PROCEDURE — 70553 MRI BRAIN STEM W/O & W/DYE: CPT

## 2021-12-29 PROCEDURE — 74011000250 HC RX REV CODE- 250: Performed by: HOSPITALIST

## 2021-12-29 RX ORDER — ATORVASTATIN CALCIUM 40 MG/1
40 TABLET, FILM COATED ORAL
Status: DISCONTINUED | OUTPATIENT
Start: 2021-12-29 | End: 2021-12-30 | Stop reason: HOSPADM

## 2021-12-29 RX ORDER — ACETAMINOPHEN 325 MG/1
650 TABLET ORAL
Status: DISCONTINUED | OUTPATIENT
Start: 2021-12-29 | End: 2021-12-30 | Stop reason: HOSPADM

## 2021-12-29 RX ORDER — OXYCODONE HYDROCHLORIDE 5 MG/1
5 TABLET ORAL
Status: DISCONTINUED | OUTPATIENT
Start: 2021-12-29 | End: 2021-12-30 | Stop reason: HOSPADM

## 2021-12-29 RX ORDER — KETOROLAC TROMETHAMINE 30 MG/ML
15 INJECTION, SOLUTION INTRAMUSCULAR; INTRAVENOUS
Status: DISCONTINUED | OUTPATIENT
Start: 2021-12-29 | End: 2021-12-30 | Stop reason: HOSPADM

## 2021-12-29 RX ORDER — ASPIRIN 81 MG/1
81 TABLET ORAL DAILY
Status: DISCONTINUED | OUTPATIENT
Start: 2021-12-29 | End: 2021-12-30 | Stop reason: HOSPADM

## 2021-12-29 RX ORDER — LABETALOL HYDROCHLORIDE 5 MG/ML
5 INJECTION, SOLUTION INTRAVENOUS
Status: DISCONTINUED | OUTPATIENT
Start: 2021-12-29 | End: 2021-12-30 | Stop reason: HOSPADM

## 2021-12-29 RX ORDER — KETOROLAC TROMETHAMINE 5 MG/ML
1 SOLUTION OPHTHALMIC 4 TIMES DAILY
Status: DISCONTINUED | OUTPATIENT
Start: 2021-12-29 | End: 2021-12-29

## 2021-12-29 RX ORDER — FENTANYL CITRATE 50 UG/ML
25-50 INJECTION, SOLUTION INTRAMUSCULAR; INTRAVENOUS
Status: DISCONTINUED | OUTPATIENT
Start: 2021-12-29 | End: 2021-12-30 | Stop reason: HOSPADM

## 2021-12-29 RX ORDER — AMOXICILLIN 500 MG/1
500 TABLET, FILM COATED ORAL 3 TIMES DAILY
Qty: 30 TABLET | Refills: 0 | Status: SHIPPED | OUTPATIENT
Start: 2021-12-29 | End: 2021-12-29

## 2021-12-29 RX ORDER — ENOXAPARIN SODIUM 100 MG/ML
40 INJECTION SUBCUTANEOUS EVERY 24 HOURS
Status: DISCONTINUED | OUTPATIENT
Start: 2021-12-29 | End: 2021-12-30 | Stop reason: HOSPADM

## 2021-12-29 RX ORDER — SODIUM CHLORIDE 9 MG/ML
75 INJECTION, SOLUTION INTRAVENOUS CONTINUOUS
Status: DISCONTINUED | OUTPATIENT
Start: 2021-12-29 | End: 2021-12-29

## 2021-12-29 RX ORDER — ACETAMINOPHEN 650 MG/1
650 SUPPOSITORY RECTAL
Status: DISCONTINUED | OUTPATIENT
Start: 2021-12-29 | End: 2021-12-29

## 2021-12-29 RX ORDER — FENTANYL CITRATE 50 UG/ML
25 INJECTION, SOLUTION INTRAMUSCULAR; INTRAVENOUS
Status: DISCONTINUED | OUTPATIENT
Start: 2021-12-29 | End: 2021-12-29 | Stop reason: SDUPTHER

## 2021-12-29 RX ORDER — OFLOXACIN 3 MG/ML
1 SOLUTION/ DROPS OPHTHALMIC 4 TIMES DAILY
Status: DISCONTINUED | OUTPATIENT
Start: 2021-12-29 | End: 2021-12-30 | Stop reason: HOSPADM

## 2021-12-29 RX ORDER — DIPHENHYDRAMINE HYDROCHLORIDE 50 MG/ML
12.5 INJECTION, SOLUTION INTRAMUSCULAR; INTRAVENOUS
Status: COMPLETED | OUTPATIENT
Start: 2021-12-29 | End: 2021-12-29

## 2021-12-29 RX ORDER — BUTALBITAL, ACETAMINOPHEN AND CAFFEINE 50; 325; 40 MG/1; MG/1; MG/1
1 TABLET ORAL
Status: DISCONTINUED | OUTPATIENT
Start: 2021-12-29 | End: 2021-12-30 | Stop reason: HOSPADM

## 2021-12-29 RX ORDER — PREDNISOLONE ACETATE 10 MG/ML
2 SUSPENSION/ DROPS OPHTHALMIC 4 TIMES DAILY
Status: DISCONTINUED | OUTPATIENT
Start: 2021-12-29 | End: 2021-12-29

## 2021-12-29 RX ORDER — SODIUM CHLORIDE 9 MG/ML
75 INJECTION, SOLUTION INTRAVENOUS CONTINUOUS
Status: DISPENSED | OUTPATIENT
Start: 2021-12-29 | End: 2021-12-29

## 2021-12-29 RX ORDER — ACETAMINOPHEN 650 MG/1
650 SUPPOSITORY RECTAL
Status: DISCONTINUED | OUTPATIENT
Start: 2021-12-29 | End: 2021-12-30 | Stop reason: HOSPADM

## 2021-12-29 RX ORDER — TRAMADOL HYDROCHLORIDE 50 MG/1
50 TABLET ORAL
Qty: 12 TABLET | Refills: 0 | Status: SHIPPED | OUTPATIENT
Start: 2021-12-29 | End: 2021-12-29

## 2021-12-29 RX ORDER — PROCHLORPERAZINE EDISYLATE 5 MG/ML
5 INJECTION INTRAMUSCULAR; INTRAVENOUS
Status: COMPLETED | OUTPATIENT
Start: 2021-12-29 | End: 2021-12-29

## 2021-12-29 RX ORDER — ACETAMINOPHEN 325 MG/1
650 TABLET ORAL
Status: DISCONTINUED | OUTPATIENT
Start: 2021-12-29 | End: 2021-12-29

## 2021-12-29 RX ORDER — FENTANYL CITRATE 50 UG/ML
25 INJECTION, SOLUTION INTRAMUSCULAR; INTRAVENOUS
Status: COMPLETED | OUTPATIENT
Start: 2021-12-29 | End: 2021-12-29

## 2021-12-29 RX ADMIN — SODIUM CHLORIDE 500 ML: 9 INJECTION, SOLUTION INTRAVENOUS at 04:52

## 2021-12-29 RX ADMIN — FENTANYL CITRATE 25 MCG: 50 INJECTION INTRAMUSCULAR; INTRAVENOUS at 04:52

## 2021-12-29 RX ADMIN — SODIUM ZIRCONIUM CYCLOSILICATE 10 G: 10 POWDER, FOR SUSPENSION ORAL at 12:12

## 2021-12-29 RX ADMIN — KETOROLAC TROMETHAMINE 1 DROP: 5 SOLUTION/ DROPS OPHTHALMIC at 12:11

## 2021-12-29 RX ADMIN — PROCHLORPERAZINE EDISYLATE 5 MG: 5 INJECTION INTRAMUSCULAR; INTRAVENOUS at 10:02

## 2021-12-29 RX ADMIN — PREDNISOLONE ACETATE 2 DROP: 10 SUSPENSION/ DROPS OPHTHALMIC at 10:50

## 2021-12-29 RX ADMIN — BUTALBITAL, ACETAMINOPHEN, AND CAFFEINE 1 TABLET: 50; 325; 40 TABLET ORAL at 12:09

## 2021-12-29 RX ADMIN — DIPHENHYDRAMINE HYDROCHLORIDE 12.5 MG: 50 INJECTION, SOLUTION INTRAMUSCULAR; INTRAVENOUS at 10:02

## 2021-12-29 RX ADMIN — SODIUM CHLORIDE 75 ML/HR: 9 INJECTION, SOLUTION INTRAVENOUS at 12:09

## 2021-12-29 RX ADMIN — ASPIRIN 81 MG: 81 TABLET, COATED ORAL at 10:50

## 2021-12-29 RX ADMIN — ATORVASTATIN CALCIUM 40 MG: 40 TABLET, FILM COATED ORAL at 22:14

## 2021-12-29 RX ADMIN — GADOTERIDOL 11 ML: 279.3 INJECTION, SOLUTION INTRAVENOUS at 17:55

## 2021-12-29 RX ADMIN — OFLOXACIN 1 DROP: 3 SOLUTION OPHTHALMIC at 12:11

## 2021-12-29 RX ADMIN — OFLOXACIN 1 DROP: 3 SOLUTION OPHTHALMIC at 22:13

## 2021-12-29 RX ADMIN — IOPAMIDOL 100 ML: 755 INJECTION, SOLUTION INTRAVENOUS at 04:43

## 2021-12-29 RX ADMIN — ENOXAPARIN SODIUM 40 MG: 100 INJECTION SUBCUTANEOUS at 12:09

## 2021-12-29 NOTE — CONSULTS
Vascular Surgery Consult Note  12/29/2021    Subjective:     Antwan Calabrese is a 64 y.o.  female with a pmhx significant for HLD, hyperparathyroidism, and GERD. She denies a hx of ASHD, CAD, or DM. She is a former smoker. She presented to the hospital with complaint of acute onset of severe H/A that began 36 hrs ago. A CTA of the head and neck is significant for vertebral basilar stenosis, CCA stenosis, and left SCA stenosis. We have been asked to evaluate. She denies arm weakness, tingling, or pain. She reports numbness of the fingers of the bilateral hands that is chronic. Past medical history  Hyperparathyroidism  Hyperlipidemia  GERD  Achalasia    Past procedural history  Multiple polypectomies     Family history  Coronary artery disease: Father  Cervical cancer: Sister  Gastric cancer: Brother    Social history  She is a former light smoker. She quit in September 2021. She reports occasional alcohol use  She is routinely independent of her ADLs and IADLs. She lives alone and continues to drive. Her medications  ofloxacin (FLOXIN) 0.3 % ophthalmic solution INSTILL 1 DROP TO SURGERY EYE FOUR TIMES DAILY. START 2 DAYS BEFORE SURGERY   prednisoLONE acetate (PRED FORTE) 1 % ophthalmic suspension INSTILL 1 DROP FOUR TIMES DAILY INTO EYE HAVING SURGERY X 1 WEEK THEN TAPER AS DIRECTED   ketorolac (ACULAR) 0.5 % ophthalmic solution INSTILL 1 DROP INTO EYE HAVING SURGERY FOUR TIMES DAILY. START 2 DAYS PRIOR TO SURGERY   atorvastatin (LIPITOR) 20 mg tablet Take 1 Tablet by mouth daily. For cholesterol   butalbital-acetaminophen-caffeine (FIORICET, ESGIC) -40 mg per tablet 1-2 tabs po every 6 hours as needed for headache   ibuprofen (AdviL) 200 mg tablet Take 2 Tablets by mouth every eight (8) hours as needed for Pain. Allergies  Alendronate: Dizziness, tingling, numbness, and headaches. Review of Systems   Constitutional: Positive for activity change and fatigue.  Negative for appetite change, chills, diaphoresis and fever. HENT: Negative for congestion. Eyes: Negative for visual disturbance. Respiratory: Negative for cough, chest tightness and shortness of breath. Cardiovascular: Negative for chest pain and leg swelling. Gastrointestinal: Negative for abdominal pain, nausea and vomiting. Endocrine: Negative for polydipsia and polyuria. Genitourinary: Negative. Musculoskeletal: Negative for gait problem and myalgias. Skin: Negative for color change and wound. Allergic/Immunologic: Negative for immunocompromised state. Neurological: Positive for numbness (B/L fingers) and headaches. Negative for dizziness, facial asymmetry, speech difficulty, weakness and light-headedness. Hematological: Negative. Psychiatric/Behavioral: Negative. Objective:     Patient Vitals for the past 24 hrs:   BP Temp Pulse Resp SpO2 Height Weight   12/29/21 1400 104/79  (!) 55 17      12/29/21 1330   65 14      12/29/21 1300 126/64  (!) 45 13      12/29/21 1130   (!) 51 15 100 %     12/29/21 1100 (!) 109/59  (!) 53 12 95 %     12/29/21 1040 (!) 124/55 97.7 °F (36.5 °C) (!) 49 10 98 %     12/29/21 0919 (!) 88/57         12/29/21 0918 (!) 109/96         12/29/21 0700 (!) 91/52 97.8 °F (36.6 °C) (!) 53 12 99 %     12/29/21 0121 (!) 103/51  (!) 48       12/28/21 2247 (!) 88/47 97.3 °F (36.3 °C) 69 18 100 % 5' 5\" (1.651 m) 63.2 kg (139 lb 5.3 oz)     Physical Exam  HENT:      Head: Normocephalic. Nose: Nose normal.      Mouth/Throat:      Mouth: Mucous membranes are moist.   Eyes:      Pupils: Pupils are equal, round, and reactive to light. Neck:      Vascular: No carotid bruit. Cardiovascular:      Rate and Rhythm: Normal rate and regular rhythm. Pulmonary:      Effort: Pulmonary effort is normal. No respiratory distress. Abdominal:      General: Abdomen is flat. There is no distension.    Musculoskeletal:         General: Normal range of motion. Cervical back: Normal range of motion. Skin:     General: Skin is warm. Neurological:      General: No focal deficit present. Mental Status: She is alert. Mental status is at baseline. Psychiatric:         Mood and Affect: Mood normal.         Behavior: Behavior normal.       Pertinent Test Results:   Recent Results (from the past 24 hour(s))   METABOLIC PANEL, COMPREHENSIVE    Collection Time: 12/28/21 11:30 PM   Result Value Ref Range    Sodium 132 (L) 136 - 145 mmol/L    Potassium 5.3 (H) 3.5 - 5.1 mmol/L    Chloride 101 97 - 108 mmol/L    CO2 27 21 - 32 mmol/L    Anion gap 4 (L) 5 - 15 mmol/L    Glucose 78 65 - 100 mg/dL    BUN 23 (H) 6 - 20 MG/DL    Creatinine 1.04 (H) 0.55 - 1.02 MG/DL    BUN/Creatinine ratio 22 (H) 12 - 20      GFR est AA >60 >60 ml/min/1.73m2    GFR est non-AA 51 (L) >60 ml/min/1.73m2    Calcium 8.9 8.5 - 10.1 MG/DL    Bilirubin, total 0.1 (L) 0.2 - 1.0 MG/DL    ALT (SGPT) 27 12 - 78 U/L    AST (SGOT) 23 15 - 37 U/L    Alk. phosphatase 105 45 - 117 U/L    Protein, total 7.3 6.4 - 8.2 g/dL    Albumin 3.6 3.5 - 5.0 g/dL    Globulin 3.7 2.0 - 4.0 g/dL    A-G Ratio 1.0 (L) 1.1 - 2.2     CBC WITH AUTOMATED DIFF    Collection Time: 12/28/21 11:30 PM   Result Value Ref Range    WBC 7.4 3.6 - 11.0 K/uL    RBC 3.17 (L) 3.80 - 5.20 M/uL    HGB 9.7 (L) 11.5 - 16.0 g/dL    HCT 29.3 (L) 35.0 - 47.0 %    MCV 92.4 80.0 - 99.0 FL    MCH 30.6 26.0 - 34.0 PG    MCHC 33.1 30.0 - 36.5 g/dL    RDW 14.3 11.5 - 14.5 %    PLATELET 283 855 - 128 K/uL    MPV 11.2 8.9 - 12.9 FL    NRBC 0.0 0  WBC    ABSOLUTE NRBC 0.00 0.00 - 0.01 K/uL    NEUTROPHILS 47 32 - 75 %    LYMPHOCYTES 42 12 - 49 %    MONOCYTES 7 5 - 13 %    EOSINOPHILS 3 0 - 7 %    BASOPHILS 1 0 - 1 %    IMMATURE GRANULOCYTES 0 0.0 - 0.5 %    ABS. NEUTROPHILS 3.5 1.8 - 8.0 K/UL    ABS. LYMPHOCYTES 3.1 0.8 - 3.5 K/UL    ABS. MONOCYTES 0.5 0.0 - 1.0 K/UL    ABS. EOSINOPHILS 0.2 0.0 - 0.4 K/UL    ABS.  BASOPHILS 0.0 0.0 - 0.1 K/UL    ABS. IMM. GRANS. 0.0 0.00 - 0.04 K/UL    DF AUTOMATED     SARS-COV-2    Collection Time: 12/29/21  4:45 AM   Result Value Ref Range    SARS-CoV-2 Please find results under separate order         Assessmen/Plan:     B/L vertebral, common carotid, and left subclavian stenosis  -recent hx of removal of B/L cataracts (12/8 & 12/15)  -MRI of the brain pending  -Neurology following   · Dr. Matt Padron to review CTA and determine plan of care. · Did not take blood pressure in left arm as result will be erroneous.     Acute onset of headache  -Covid PCR pending  -Blood pressure labile  Hyperparathyroidis   Hyponatremia   Bradycardia  Hyperlipidemia  - (12/23)  Anemia   GERD    VTE Prophylaxis:   Per primary team    Disposition:  Home    Signed By: Leigh Marvin NP     December 29, 2021

## 2021-12-29 NOTE — ED NOTES
Ambulatory without assist to restroom. Resting quietly in bed, no acute distress noted.  Call bell in reach

## 2021-12-29 NOTE — PROGRESS NOTES
MRI Pending:    [facility]. Sign and fax to [phone]. Call [phone] once faxed.

## 2021-12-29 NOTE — H&P
Hospitalist Admission Note    NAME: Eva Valentin   :  1945   MRN:  [id number]     Date/Time:  2021 8:25 AM    Patient PCP: Zachariah Emmanuel MD    Please note that this dictation was completed with "[employer] the computer voice recognition software. Quite often unanticipated grammatical, syntax, homophones, and other interpretive errors are inadvertently transcribed by the computer software. Please disregard these errors. Please excuse any errors that have escaped final proofreading  ______________________________________________________________________   Assessment & Plan:  Acute onset severe headache across forehead 10/10  Severe stenosis of left proximal vertebral artery, POA  Moderate to severe stenosis of right proximal vertebral artery, POA  Severe stenosis left proximal subclavian artery, POA  --evaluated by teleneurologist who recommend admission for MRI brain with and without contrast and MR venogram, urine drug screen and local neurology consult. If work up negative, defer to neurology if recommends LP.  --no evidence of infection (no fever, leukocytosis)  --check UA  --put on aspirin 81mg and increase lipitor from 20mg to 40mg every day due to intracranial atherosclerosis disease  --await call back from neurointerventional radiology to see if benefit from outpatient consultation if work up is unrevealing  --continue fioricet prn, toradol prn, oxycodone and fentanyl prn if severe    Hypotension in left arm due to severe left subclavian artery stenosis  --BP checks in both arms reveal left arm 88/57 and right arm 109/69.    --refer to vascular surgery outpatient to eval for potential stent  --check echo, ekg    Acute hyponatremia Na 132, POA  --may be due to pain. Check urine sodium, urine osmol, serum osmol, tsh, AM cortisol  --getting NS 75ml/hr. Recheck in AM    Mild hyperkalemia, chronic 5.3  --put on low sodium diet.   Check ekg  --veltassa x 1 dose    Normocytic anemia  --check hemoccult stool, iron profile, ferritin, monitor H&H. Patient denies any sign of bleeding    Hyperlipidemia  -- on 12/23 so will not recheck  --increase lipitor from 20mg to 40mg daily    Recent b/l cataract surgery on 12/8 and 12/15  --continue eye drops to right eye    Body mass index is 23.19 kg/m². Code: full  DVT prophylaxis: lovenox  Surrogate decision maker:  Daughter Nory Mail    Dr. Vijay Curtis to assume care in AM    Addendum:  Spoke to neurointerventionalist Dr. Arlet Mc. He reviewed CTA head and neck. Recommended consulting vascular surgery to see inpatient as he also sees moderate stenosis at brachiocephalic and severe stenosis at left common carotid a. Advised to f/u in his clinic at [facility] regarding severe stenosis in proximal b/l vertebral artery. Vascular surgery consult called and d/w Tramaine Roberts NP. Subjective:   CHIEF COMPLAINT:  headache    HISTORY OF PRESENT ILLNESS:     Maldonado Corley is a 64 y.o. female     Chief Complaint   Patient presents with    Headache       Reports 10/10 headache starting last night. Reports normal vision, denied nausea and vomiting.  Hypotension       BP in triage 88/47, Daughter reports pt usually has low bps. With PMH hyperlipidemia, occasional headache but none in past several months, woke up at 2am yesterday morning with sudden onset of severe headache across the forehead 10 out of 10. Headache is described as throbbing, no radiation, associated with mild generalized weakness and pain in her gums. She took 2 Fioricet tablets that Dr. Vijay Curtis had previously prescribed with little relief. She waited at home hoping that the headache would get better but did not so she eventually presented to the emergency room last night. She had CT head, CTA head and neck and was going to be discharged home with empiric course of amoxicillin for sinusitis. She was given fentanyl in the ER with relief.   However upon waking up for discharge her severe headache returned. She was evaluated by the telemetry neurologist who recommended admission for further evaluation as mentioned above. She denies any acute visual change, nausea vomiting, pain with mastication. She has had rhinorrhea with clear nasal discharge. Denies any fevers or chills. No new medications or recent illness. A Covid PCR test was sent in anticipation of her discharge home. We were asked to admit for work up and evaluation of the above problems. Past Medical History:   Diagnosis Date    Achalasia     GERD (gastroesophageal reflux disease)     Hypercholesterolemia     Hyperparathyroidism ([address]       Past Surgical History:   Procedure Laterality Date    COLONOSCOPY N/A 2016    COLONOSCOPY performed by Gonzalo Quiroga MD at . [facility] LESN,FORCEP/CAUTERY  2016         Pratik Bernardo  2016         ENDOSCOPY, COLON, DIAGNOSTIC  2006    amanda- nl- but had hx polyp in     HX GYN      vaginal deliveries x3    ND COLONOSCOPY FLX DX W/COLLJ SPEC WHEN PFRMD  2011         ND ESOPHAGOGASTRODUODENOSCOPY TRANSORAL DIAGNOSTIC  2011         UPPER GI ENDOSCOPY,BALL DIL,30MM  2017          Social History     Tobacco Use    Smoking status: Former Smoker     Packs/day: 0.10     Types: Cigarettes     Quit date: 2021     Years since quittin.3    Smokeless tobacco: Never Used   Substance Use Topics    Alcohol use: Yes     Comment: occ      Drug use:  Denies  Lives alone      Family History   Problem Relation Age of Onset    Heart Disease Father     Cancer Sister         cervical Cancer    Cancer Brother         Stomach Cancer     Allergies   Allergen Reactions    Alendronate Other (comments)     Dizziness, tingling, numbness, headaches        Prior to Admission medications    Medication Sig Start Date End Date Taking?  Authorizing Provider   ofloxacin (FLOXIN) 0.3 % ophthalmic solution INSTILL 1 DROP TO SURGERY EYE FOUR TIMES DAILY. START 2 DAYS BEFORE SURGERY 9/27/21   Provider, Historical   prednisoLONE acetate (PRED FORTE) 1 % ophthalmic suspension INSTILL 1 DROP FOUR TIMES DAILY INTO EYE HAVING SURGERY X 1 WEEK THEN TAPER AS DIRECTED 9/27/21   Provider, Historical   ketorolac (ACULAR) 0.5 % ophthalmic solution INSTILL 1 DROP INTO EYE HAVING SURGERY FOUR TIMES DAILY. START 2 DAYS PRIOR TO SURGERY 9/27/21   Provider, Historical   atorvastatin (LIPITOR) 20 mg tablet Take 1 Tablet by mouth daily. For cholesterol 7/30/21   Johana Hawkins MD   butalbital-acetaminophen-caffeine [facility]) -40 mg per tablet 1-2 tabs po every 6 hours as needed for headache 7/30/21   Johana Hawkins MD   ibuprofen (AdviL) 200 mg tablet Take 2 Tablets by mouth every eight (8) hours as needed for Pain. 6/2/21   Johana Hawkins MD     REVIEW OF SYSTEMS:  POSITIVE= Bold.   Negative = normal text  General:  fever, chills, sweats, generalized weakness, weight loss/gain, loss of appetite  Eyes:  blurred vision, eye pain, loss of vision, diplopia  Ear Nose and Throat:  rhinorrhea, pharyngitis  Respiratory:   cough, sputum production, SOB, wheezing, KENNEY, pleuritic pain  Cardiology:  chest pain, palpitations, orthopnea, PND, edema, syncope   Gastrointestinal:  abdominal pain, N/V, dysphagia, diarrhea, constipation, bleeding  Genitourinary:  frequency, urgency, dysuria, hematuria, incontinence  Muskuloskeletal :  arthralgia, myalgia  Hematology:  easy bruising, bleeding, lymphadenopathy  Dermatological:  rash, ulceration, pruritis  Endocrine:  hot flashes or polydipsia  Neurological:  headache, dizziness, confusion, focal weakness, paresthesia, memory loss, gait disturbance  Psychological: anxiety, depression, agitation      Objective:   VITALS:    Visit Vitals  BP (!) 91/52   Pulse (!) 53   Temp 97.3 °F (36.3 °C)   Resp 12   Ht 5' 5\" (1.651 m)   Wt 63.2 kg (139 lb 5.3 oz)   SpO2 99%   BMI 23.19 kg/m²     Temp (24hrs), Av.3 °F (36.3 °C), Min:97.3 °F (36.3 °C), Max:97.3 °F (36.3 °C)    Body mass index is 23.19 kg/m². PHYSICAL EXAM:    General:    Alert, cooperative, no distress, appears stated age. HEENT: Atraumatic, anicteric sclerae, pink conjunctivae     No oral ulcers, mucosa moist, throat clear. Hearing intact. Neck:  Supple, symmetrical,  thyroid: non tender  Lungs:   Clear to auscultation bilaterally. No Wheezing or Rhonchi. No rales. Chest wall:  No tenderness  No Accessory muscle use. Heart:   Regular  rhythm,  No  murmur   No gallop. No edema. Abdomen:   Soft, non-tender. Not distended. Bowel sounds normal. No masses  Extremities: No cyanosis. No clubbing  Skin:     Not pale Not Jaundiced  No rashes   Psych:  Good insight. Not depressed. Not anxious or agitated. Neurologic: EOMs intact. No facial asymmetry. No aphasia or slurred speech. Symmetrical strength 5/5, Alert and oriented X 3. Finger to nose intact  Peripheral pulse: Bilateral, DP, 1+  Capillary refill:  normal    IMAGING RESULTS:   []       I have personally reviewed the actual   []     CXR  []     CT scan  CXR:  CT head:  FINDINGS:   The sulci and ventricles are within normal limits for patient age. There is no  evidence of an acute infarction, hemorrhage, or mass-effect. There is no  evidence of midline shift or hydrocephalus. Posterior fossa structures are  unremarkable. No extra-axial collections are seen. Mastoid air cells are well pneumatized and clear. Few scattered hypodensities in  the cerebral white matter likely mild chronic microvascular change. There is no evidence of depressed skull fractures of soft tissue swelling.     IMPRESSION  No acute intracranial process. CTA head and neck:  FINDINGS:  CTA NECK  There is conventional three vessel arch anatomy.    % of right carotid artery stenosis: 0  % of left carotid artery stenosis: 0  Measurements utilizing NASCET criteria.     NASCET method was utilized for calculating stenosis.      There is no pulmonary mass or nodule. There is near stenosis/near occlusion of  the left subclavian artery. /. Right vertebral artery slightly larger than the  left vertebral artery. . Moderate to severe stenosis of the proximal right  vertebral artery There is mild atherosclerotic changes the origin of the ICA on  the right and on the left. Atherosclerotic changes the origin of the common  carotid artery. .     CTA HEAD  There is sphenoid sinus disease. There is severe stenosis/near occlusion of the  proximal V4 segment on the left Right vertebral artery is slightly larger than  the left. Petrous and cavernous ICAs are patent. Posterior communicating artery  on the right. Hypoplastic A1 segment on the left. M1 segments are patent. M2  segments demonstrate symmetric arborization. A2 and A3 segments are patent. .. Petrous and cavernous carotid arteries are patent. .A 2 segments are patent. Symmetric arborization of M2 vessels is demonstrated. The basilar artery is  patent. . The M1 segments are patent bilaterally. .The posterior cerebral arteries  on the right and on the left are patent. .  . Small caudate hypodensity is  chronic in nature. No evidence of acute intracranial hemorrhage or midline shift is demonstrated. IMPRESSION  Severe stenosis/near occlusion of the proximal left subclavian artery. Reconstituted flow in the left vertebral artery is demonstrated.     Severe stenosis in the left vertebral artery at the junction between the V3 and  V4 segments at the skull base. Moderate to severe stenosis of the proximal right vertebral artery. There is no intracranial mass, hemorrhage or evidence of acute infarction.     No acute intracranial process is identified. .   EKG: ordered   ________________________________________________________________________  Care Plan discussed with:    Comments   Patient y    Family      RN     Care Manager                    Consultant:  y ED physician ________________________________________________________________________  Prophylaxis:  GI none   DVT lovenox   ________________________________________________________________________  Recommended Disposition:   Home with Family y   HH/PT/OT/RN    SNF/LTC    MIKE    ________________________________________________________________________  Code Status:  Full Code y   DNR/DNI    ________________________________________________________________________  TOTAL TIME:  60 minutes      Comments    y Reviewed previous records   >50% of visit spent in counseling and coordination of care  Discussion with patient and/or family and questions answered         ______________________________________________________________________  Phyllis Edge MD      Procedures: see electronic medical records for all procedures/Xrays and details which were not copied into this note but were reviewed prior to creation of Plan. LAB DATA REVIEWED:    Recent Results (from the past 24 hour(s))   METABOLIC PANEL, COMPREHENSIVE    Collection Time: 12/28/21 11:30 PM   Result Value Ref Range    Sodium 132 (L) 136 - 145 mmol/L    Potassium 5.3 (H) 3.5 - 5.1 mmol/L    Chloride 101 97 - 108 mmol/L    CO2 27 21 - 32 mmol/L    Anion gap 4 (L) 5 - 15 mmol/L    Glucose 78 65 - 100 mg/dL    BUN 23 (H) 6 - 20 MG/DL    Creatinine 1.04 (H) 0.55 - 1.02 MG/DL    BUN/Creatinine ratio 22 (H) 12 - 20      GFR est AA >60 >60 ml/min/1.73m2    GFR est non-AA 51 (L) >60 ml/min/1.73m2    Calcium 8.9 8.5 - 10.1 MG/DL    Bilirubin, total 0.1 (L) 0.2 - 1.0 MG/DL    ALT (SGPT) 27 12 - 78 U/L    AST (SGOT) 23 15 - 37 U/L    Alk.  phosphatase 105 45 - 117 U/L    Protein, total 7.3 6.4 - 8.2 g/dL    Albumin 3.6 3.5 - 5.0 g/dL    Globulin 3.7 2.0 - 4.0 g/dL    A-G Ratio 1.0 (L) 1.1 - 2.2     CBC WITH AUTOMATED DIFF    Collection Time: 12/28/21 11:30 PM   Result Value Ref Range    WBC 7.4 3.6 - 11.0 K/uL    RBC 3.17 (L) 3.80 - 5.20 M/uL    HGB 9.7 (L) 11.5 - 16.0 g/dL    HCT 29.3 (L) 35.0 - 47.0 %    MCV 92.4 80.0 - 99.0 FL    MCH 30.6 26.0 - 34.0 PG    MCHC 33.1 30.0 - 36.5 g/dL    RDW 14.3 11.5 - 14.5 %    PLATELET 778 922 - 385 K/uL    MPV 11.2 8.9 - 12.9 FL    NRBC 0.0 0  WBC    ABSOLUTE NRBC 0.00 0.00 - 0.01 K/uL    NEUTROPHILS 47 32 - 75 %    LYMPHOCYTES 42 12 - 49 %    MONOCYTES 7 5 - 13 %    EOSINOPHILS 3 0 - 7 %    BASOPHILS 1 0 - 1 %    IMMATURE GRANULOCYTES 0 0.0 - 0.5 %    ABS. NEUTROPHILS 3.5 1.8 - 8.0 K/UL    ABS. LYMPHOCYTES 3.1 0.8 - 3.5 K/UL    ABS. MONOCYTES 0.5 0.0 - 1.0 K/UL    ABS. EOSINOPHILS 0.2 0.0 - 0.4 K/UL    ABS. BASOPHILS 0.0 0.0 - 0.1 K/UL    ABS. IMM.  GRANS. 0.0 0.00 - 0.04 K/UL    DF AUTOMATED     SARS-COV-2    Collection Time: 12/29/21  4:45 AM   Result Value Ref Range    SARS-CoV-2 Please find results under separate order

## 2021-12-29 NOTE — ED NOTES
Patient is being transferred to [address], Room # 2120. Report given to JOY Glynn on Glory Quiros for routine progression of care. Report consisted of the following information SBAR, Kardex, ED Summary and MAR. Patient transferred to receiving unit by: transport (RN or tech name). Outstanding consults needed: No     Next labs due: No     The following personal items will be sent with the patient during transfer to the floor: All valuables:    Cardiac monitoring ordered: No     The following CURRENT information was reported to the receiving RN:    Code status: Full Code at time of transfer    Last set of vital signs:  Vital Signs  Level of Consciousness: Alert (0) (12/29/21 1040)  Temp: 97.7 °F (36.5 °C) (12/29/21 1040)  Temp Source: Oral (12/28/21 2247)  Pulse (Heart Rate): (!) 48 (12/29/21 1700)  Cardiac Rhythm: Sinus Pelon (12/29/21 0300)  Resp Rate: 12 (12/29/21 1700)  BP: 126/68 (12/29/21 1700)  MAP (Monitor): 82 (12/29/21 1700)  MAP (Calculated): 87 (12/29/21 1700)  BP 1 Location: Left upper arm (12/29/21 0919)  BP Patient Position: At rest;Supine (12/28/21 2247)  MEWS Score: 1 (12/29/21 1040)         Oxygen Therapy  O2 Sat (%): 100 % (12/29/21 1701)  Pulse via Oximetry: 48 beats per minute (12/29/21 1400)  O2 Device: None (Room air) (12/28/21 2247)      Last pain assessment:  Pain 1  Pain Scale 1: Numeric (0 - 10)  Pain Intensity 1: 10  Pain Location 1: Head      Wounds: No     Urinary catheter: voiding  Is there a valdovinos order: No     LDAs:       Peripheral IV 12/29/21 Right Antecubital (Active)   Site Assessment Clean, dry, & intact 12/29/21 0848   Phlebitis Assessment 0 12/29/21 0848   Infiltration Assessment 0 12/29/21 0848   Dressing Status Clean, dry, & intact 12/29/21 0848   Dressing Type Tape;Transparent 12/29/21 0848         Opportunity for questions and clarification was provided.     Kisha Beard RN

## 2021-12-29 NOTE — PROGRESS NOTES
TRANSFER - IN REPORT:    Verbal report received from Dwayne Salas (name) on Romeo Salazar  being received from ER (unit) for routine progression of care      Report consisted of patients Situation, Background, Assessment and   Recommendations(SBAR). Information from the following report(s) SBAR, Kardex and Cardiac Rhythm SB was reviewed with the receiving nurse. Opportunity for questions and clarification was provided. Assessment completed upon patients arrival to unit and care assumed.

## 2021-12-29 NOTE — ED NOTES
Followed up with MRI, confirmed that all necessary paperwork has been received, just waiting for open slot

## 2021-12-29 NOTE — PROGRESS NOTES
Transition of Care Plan:    RUR: 8% low risk for readmission   Disposition: Home alone  Follow up appointments: PCP and Specialists if indicated  DME needed: Pt uses no DME at baseline   Transportation at Discharge: Pt's daughter  Katerine Roblero or means to access home: Will have access        Medicare Letter: Will need 2nd [facility] letter prior to d/c. Is patient a BCPI-A Bundle: N/A          If yes, was Bundle Letter given?:    Is patient a [place] and connected with the [facility]? N/A  If yes, was [employer] transfer form completed and [facility] notified? Caregiver Contact: Pt's daughter, Sylvia Chávez) [phone]  Discharge Caregiver contacted prior to discharge? Unit CM to follow. Reason for Admission:  Acute onset severe headache across forehead, severe stenosis of left proximal vertebral artery, moderate to severe stenosis of right proximal vertebral artery, severe stenosis left proximal subclavian artery                     RUR Score:    8% low risk for readmission                   Plan for utilizing home health:  No home roselyn needs identified at present. Pending hospital course         PCP: First and Last name:  Sofi Cullen MD     Name of Practice:    Are you a current patient: Yes/No: Yes   Approximate date of last visit: Last week per pt   Can you participate in a virtual visit with your PCP: No                    Current Advanced Directive/Advance Care Plan: Full Code   [address] (ACP) Conversation      Date of Conversation: 12/29/2021  Conducted with: Patient with Joe 153: Pt's three children are Legal NOK  No healthcare decision makers have been documented. Click here to complete [address] including selection of the Healthcare Decision Maker Relationship (ie \"Primary\")    Today we documented Decision Maker(s) consistent with Legal Next of Kin hierarchy.     Content/Action Overview:   DECLINED ACP conversation - will revisit periodically   Reviewed DNR/DNI and patient elects Full Code (Attempt Resuscitation)      Length of Voluntary ACP Conversation in minutes:  <16 minutes (Non-Billable)    [place]:   Pt's three children are legal NOK                    Transition of Care Plan:  Home with outpatient follow up    CM reviewed chart. Pt currently on isolation precautions while awaiting results of COVID test, CM completed assessment with pt via room phone. CM introduced self/role, verified demographics, and discussed discharge planning. Pt resides alone in home with no steps to enter. Pt reports her daughter, Beatris Lowry, lives nearby. Pt has two other children. Pt's daughter will transport home at d/c. Pt is independent at baseline to include driving, no DME use. Pt is active with PCP, last seen last week. Pharmacy preference is [facility] on [address]. Unit care management will continue to follow for transition of care planning needs. Care Management Interventions  PCP Verified by CM: Yes  Mode of Transport at Discharge:  Other (see comment) (Daughter)  Transition of Care Consult (CM Consult): Discharge Planning  Discharge Durable Medical Equipment: No (No DME use at baseline, owns no DME)  Physical Therapy Consult: Yes  Occupational Therapy Consult: Yes  Speech Therapy Consult: Yes  Support Systems: Child(dina) (Three children)  Confirm Follow Up Transport: Family  Discharge Location  Discharge Placement: Home (Home alone with outpatient follow up appts)    Kyra Marti 715, [address]

## 2021-12-29 NOTE — ED PROVIDER NOTES
EMERGENCY DEPARTMENT HISTORY AND PHYSICAL EXAM      Date: 12/29/2021  Patient Name: Roxie Sommers    History of Presenting Illness     Chief Complaint   Patient presents with    Headache     Reports 10/10 headache starting last night. Reports normal vision, denied nausea and vomiting.  Hypotension     BP in triage 88/47, Daughter reports pt usually has low bps. History Provided By: Patient and Patient's Daughter    HPI: Roxie Sommers, 64 y.o. female with PMHx significant for hyperlipidemia, GERD, achalasia, and headaches presents to the ED with fairly sudden onset is severe, 10 out of 10, sharp  headache that started at 2 AM yesterday morning (about 26 hours ago at the time of my interview). It awoke her from sleep at that time. Patient denies any vision changes. Denies any nausea or vomiting. Denies any neck stiffness. Denies any fevers or chills. She does report photophobia. She took Fioricet which has been prescribed by . She had in for her usual headaches with no relief. She states this headache is different than her previous ones. Denies any dysuria, hematuria, urinary frequency. Denies any numbness, tingling, weakness. Triage nurse states that patient was \"wobbly\" and required assistance when she arrived and blood pressure was noted to be low at 88 systolic on arrival.    PCP: Veto Rodriges MD    No current facility-administered medications on file prior to encounter. Current Outpatient Medications on File Prior to Encounter   Medication Sig Dispense Refill    ofloxacin (FLOXIN) 0.3 % ophthalmic solution INSTILL 1 DROP TO SURGERY EYE FOUR TIMES DAILY. START 2 DAYS BEFORE SURGERY      prednisoLONE acetate (PRED FORTE) 1 % ophthalmic suspension INSTILL 1 DROP FOUR TIMES DAILY INTO EYE HAVING SURGERY X 1 WEEK THEN TAPER AS DIRECTED      ketorolac (ACULAR) 0.5 % ophthalmic solution INSTILL 1 DROP INTO EYE HAVING SURGERY FOUR TIMES DAILY.  START 2 DAYS PRIOR TO SURGERY      atorvastatin (LIPITOR) 20 mg tablet Take 1 Tablet by mouth daily. For cholesterol 90 Tablet 3    butalbital-acetaminophen-caffeine (FIORICET, ESGIC) -40 mg per tablet 1-2 tabs po every 6 hours as needed for headache 50 Tablet 1    ibuprofen (AdviL) 200 mg tablet Take 2 Tablets by mouth every eight (8) hours as needed for Pain. 50 Tablet 1       Past History     Past Medical History:  Past Medical History:   Diagnosis Date    Achalasia     GERD (gastroesophageal reflux disease)     Hypercholesterolemia     Hyperparathyroidism ([address]        Past Surgical History:  Past Surgical History:   Procedure Laterality Date    COLONOSCOPY N/A 2016    COLONOSCOPY performed by Derick Canada MD at . [facility] LESN,FORCEP/CAUTERY  2016         Monique Barnes  2016         ENDOSCOPY, COLON, DIAGNOSTIC  2006    amanda- nl- but had hx polyp in     HX GYN      vaginal deliveries x3    NE COLONOSCOPY FLX DX W/COLLJ SPEC WHEN PFRMD  2011         NE ESOPHAGOGASTRODUODENOSCOPY TRANSORAL DIAGNOSTIC  2011         UPPER GI ENDOSCOPY,BALL DIL,30MM  2017            Family History:  Family History   Problem Relation Age of Onset    Heart Disease Father     Cancer Sister         cervical Cancer    Cancer Brother         Stomach Cancer       Social History:  Social History     Tobacco Use    Smoking status: Former Smoker     Packs/day: 0.10     Types: Cigarettes     Quit date: 2021     Years since quittin.3    Smokeless tobacco: Never Used   Substance Use Topics    Alcohol use: Yes     Comment: occ    Drug use: Not on file       Allergies: Allergies   Allergen Reactions    Alendronate Other (comments)     Dizziness, tingling, numbness, headaches         Review of Systems   Review of Systems   Constitutional: Negative for chills and fever. HENT: Negative for congestion, ear pain, rhinorrhea and sore throat. Eyes: Positive for photophobia. Negative for discharge and visual disturbance. Respiratory: Negative for cough, chest tightness, shortness of breath and wheezing. Cardiovascular: Negative for chest pain and palpitations. Gastrointestinal: Negative for abdominal pain, diarrhea, nausea and vomiting. Genitourinary: Negative for dysuria, flank pain and hematuria. Musculoskeletal: Negative for back pain, myalgias and neck pain. Skin: Negative for rash and wound. Neurological: Positive for headaches. Negative for dizziness and syncope. Psychiatric/Behavioral: Negative for confusion. The patient is nervous/anxious. All other systems reviewed and are negative.         Physical Exam    General appearance -elderly, thin, moderate pain distress  Eyes - pupils equal and reactive, extraocular eye movements intact  ENT - mucous membranes moist, pharynx normal without lesions  Neck - supple, no significant adenopathy; non-tender to palpation, no meningismus  Chest - clear to auscultation, no wheezes, rales or rhonchi; non-tender to palpation  Heart - normal rate and regular rhythm, S1 and S2 normal, no murmurs noted  Abdomen - soft, nontender, nondistended, no masses or organomegaly  Musculoskeletal - no joint tenderness, deformity or swelling; normal ROM  Extremities - peripheral pulses normal, no pedal edema  Skin - normal coloration and turgor, no rashes  Neurological - alert, oriented x3, normal speech, no focal findings or movement disorder noted    Diagnostic Study Results     Labs -     Recent Results (from the past 12 hour(s))   METABOLIC PANEL, COMPREHENSIVE    Collection Time: 12/28/21 11:30 PM   Result Value Ref Range    Sodium 132 (L) 136 - 145 mmol/L    Potassium 5.3 (H) 3.5 - 5.1 mmol/L    Chloride 101 97 - 108 mmol/L    CO2 27 21 - 32 mmol/L    Anion gap 4 (L) 5 - 15 mmol/L    Glucose 78 65 - 100 mg/dL    BUN 23 (H) 6 - 20 MG/DL    Creatinine 1.04 (H) 0.55 - 1.02 MG/DL    BUN/Creatinine ratio 22 (H) 12 - 20      GFR est AA >60 >60 ml/min/1.73m2    GFR est non-AA 51 (L) >60 ml/min/1.73m2    Calcium 8.9 8.5 - 10.1 MG/DL    Bilirubin, total 0.1 (L) 0.2 - 1.0 MG/DL    ALT (SGPT) 27 12 - 78 U/L    AST (SGOT) 23 15 - 37 U/L    Alk. phosphatase 105 45 - 117 U/L    Protein, total 7.3 6.4 - 8.2 g/dL    Albumin 3.6 3.5 - 5.0 g/dL    Globulin 3.7 2.0 - 4.0 g/dL    A-G Ratio 1.0 (L) 1.1 - 2.2     CBC WITH AUTOMATED DIFF    Collection Time: 12/28/21 11:30 PM   Result Value Ref Range    WBC 7.4 3.6 - 11.0 K/uL    RBC 3.17 (L) 3.80 - 5.20 M/uL    HGB 9.7 (L) 11.5 - 16.0 g/dL    HCT 29.3 (L) 35.0 - 47.0 %    MCV 92.4 80.0 - 99.0 FL    MCH 30.6 26.0 - 34.0 PG    MCHC 33.1 30.0 - 36.5 g/dL    RDW 14.3 11.5 - 14.5 %    PLATELET 193 422 - 657 K/uL    MPV 11.2 8.9 - 12.9 FL    NRBC 0.0 0  WBC    ABSOLUTE NRBC 0.00 0.00 - 0.01 K/uL    NEUTROPHILS 47 32 - 75 %    LYMPHOCYTES 42 12 - 49 %    MONOCYTES 7 5 - 13 %    EOSINOPHILS 3 0 - 7 %    BASOPHILS 1 0 - 1 %    IMMATURE GRANULOCYTES 0 0.0 - 0.5 %    ABS. NEUTROPHILS 3.5 1.8 - 8.0 K/UL    ABS. LYMPHOCYTES 3.1 0.8 - 3.5 K/UL    ABS. MONOCYTES 0.5 0.0 - 1.0 K/UL    ABS. EOSINOPHILS 0.2 0.0 - 0.4 K/UL    ABS. BASOPHILS 0.0 0.0 - 0.1 K/UL    ABS. IMM. GRANS. 0.0 0.00 - 0.04 K/UL    DF AUTOMATED     SARS-COV-2    Collection Time: 12/29/21  4:45 AM   Result Value Ref Range    SARS-CoV-2 Please find results under separate order         Radiologic Studies -   CTA HEAD NECK W CONT   Final Result   Severe stenosis/near occlusion of the proximal left subclavian artery. Reconstituted flow in the left vertebral artery is demonstrated. Severe stenosis in the left vertebral artery at the junction between the V3 and   V4 segments at the skull base. Moderate to severe stenosis of the proximal right vertebral artery. There is no intracranial mass, hemorrhage or evidence of acute infarction. No acute intracranial process is identified. .       CT HEAD WO CONT   Final Result   No acute intracranial process. CT Results  (Last 48 hours)               12/29/21 0442  CTA HEAD NECK W CONT Final result    Impression:  Severe stenosis/near occlusion of the proximal left subclavian artery. Reconstituted flow in the left vertebral artery is demonstrated. Severe stenosis in the left vertebral artery at the junction between the V3 and   V4 segments at the skull base. Moderate to severe stenosis of the proximal right vertebral artery. There is no intracranial mass, hemorrhage or evidence of acute infarction. No acute intracranial process is identified. .        Narrative:  CLINICAL HISTORY: severe sudden onset headache 24 hours ago   INDICATION: severe sudden onset headache 24 hours ago       COMPARISON: None. CONTRAST: 100 ml [id number]       TECHNIQUE:  Unenhanced  images were obtained to localize the volume for   acquisition. Multislice helical axial CT angiography was performed from the   aortic arch to the top of the head during uneventful rapid bolus intravenous   contrast administration. Coronal and sagittal reformations and 3D post   processing was performed. CT dose reduction was achieved through use of a   standardized protocol tailored for this examination and automatic exposure   control for dose modulation. FINDINGS:   CTA NECK   There is conventional three vessel arch anatomy. % of right carotid artery stenosis: 0   % of left carotid artery stenosis: 0   Measurements utilizing NASCET criteria. NASCET method was utilized for calculating stenosis. There is no pulmonary mass or nodule. There is near stenosis/near occlusion of   the left subclavian artery. /. Right vertebral artery slightly larger than the   left vertebral artery. . Moderate to severe stenosis of the proximal right   vertebral artery There is mild atherosclerotic changes the origin of the ICA on   the right and on the left. Atherosclerotic changes the origin of the common   carotid artery. Alecia Tavares CTA HEAD   There is sphenoid sinus disease. There is severe stenosis/near occlusion of the   proximal V4 segment on the left Right vertebral artery is slightly larger than   the left. Petrous and cavernous ICAs are patent. Posterior communicating artery   on the right. Hypoplastic A1 segment on the left. M1 segments are patent. M2   segments demonstrate symmetric arborization. A2 and A3 segments are patent. .. Petrous and cavernous carotid arteries are patent. .A 2 segments are patent. Symmetric arborization of M2 vessels is demonstrated. The basilar artery is   patent. . The M1 segments are patent bilaterally. .The posterior cerebral arteries   on the right and on the left are patent. .  . Small caudate hypodensity is   chronic in nature. No evidence of acute intracranial hemorrhage or midline shift is demonstrated. 12/28/21 2318  CT HEAD WO CONT Final result    Impression:  No acute intracranial process. Narrative:  CLINICAL HISTORY: Headache   INDICATION: Headache   COMPARISON: 12/27/2019. CT dose reduction was achieved through use of a standardized protocol tailored   for this examination and automatic exposure control for dose modulation. TECHNIQUE: Serial axial images with a collimation of 5 mm were obtained from the   skull base through the vertex     FINDINGS:    The sulci and ventricles are within normal limits for patient age. There is no   evidence of an acute infarction, hemorrhage, or mass-effect. There is no   evidence of midline shift or hydrocephalus. Posterior fossa structures are   unremarkable. No extra-axial collections are seen. Mastoid air cells are well pneumatized and clear. Few scattered hypodensities in   the cerebral white matter likely mild chronic microvascular change. There is no evidence of depressed skull fractures of soft tissue swelling.                CXR Results  (Last 48 hours)    None            Medical Decision Making   I am the first provider for this patient. I reviewed the vital signs, available nursing notes, past medical history, past surgical history, family history and social history. Vital Signs-Reviewed the patient's vital signs. Patient Vitals for the past 12 hrs:   Temp Pulse Resp BP SpO2   12/29/21 0121  (!) 48  (!) 103/51    12/28/21 2247 97.3 °F (36.3 °C) 69 18 (!) 88/47 100 %           Records Reviewed: Nursing Notes and Old Medical Records    Provider Notes (Medical Decision Making):   Differential diagnosis: Tension, cluster, migraine, intracranial bleed  We will check CBC, CMP, head CT and CTA of head/neck. ED Course:   Initial assessment performed. The patients presenting problems have been discussed, and they are in agreement with the care plan formulated and outlined with them. I have encouraged them to ask questions as they arise throughout their visit. Progress Notes:  ED Course as of 12/29/21 1705   Wed Dec 29, 2021   2799 Was resting comfortably and sleeping in the stretcher, however when she was awakened to discharge, she complained of continued severe headache that was only slightly improved after fentanyl. Case discussed with teleneurology who evaluated patient and reviewed imaging. Teleneurologist (Dr. Lina Raza) recommended brain MRI with and without contrast and CT venogram.  On further discussion,  States that an MR venogram would be sufficient. He also recommends admission and observation and in-house neurology consult with possible lumbar puncture if all of the studies come back and do not show a reason for patient's severe sudden headache. Case discussed with Stef Ramirez (hospitalist) who will see and admit the patient. [AO]      ED Course User Index  [AO] Tati Yung MD       Disposition:  Admit to hospitalist         Diagnosis     Clinical Impression:   1. Severe frontal headaches    2. Sphenoid sinusitis, unspecified chronicity    3.  Stenosis of both vertebral arteries

## 2021-12-29 NOTE — PROGRESS NOTES
SPEECH PATHOLOGY BEDSIDE SWALLOW EVALUATION/DISCHARGE  Patient: vOidio Vela (16 y.o. female)  Date: 12/29/2021  Primary Diagnosis: Sudden onset of severe headache [R51.9]       Precautions:        ASSESSMENT :  Based on the objective data described below, the patient presents with slow oral phase but overall swallow WNL. She denies dysphagia abut does endorse a hx of dysphagia \"a long time ago\" which is noted in EMR and was seen by us. No current needs. Patient ate a small amount quick and was interested in replacing her mask afterwards. Skilled acute therapy provided by a speech-language pathologist is not indicated at this time. PLAN :  Recommendations:  Continue current diet order: no texture restrictions  Thin liquids.    Discharge Recommendations: None     SUBJECTIVE:   Patient stated That was a long time ago re: hx of esophageal dysphagia    OBJECTIVE:     Past Medical History:   Diagnosis Date    Achalasia     GERD (gastroesophageal reflux disease)     Hypercholesterolemia     Hyperparathyroidism ([facility] [address]      Past Surgical History:   Procedure Laterality Date    COLONOSCOPY N/A 8/25/2016    COLONOSCOPY performed by Yves Coughlin MD at . [facility] LESN,FORCEP/CAUTERY  8/25/2016         COLONOSCOPY,REMV LESN,SNARE  8/25/2016         ENDOSCOPY, COLON, DIAGNOSTIC  6/2006    amanda- nl- but had hx polyp in 2002    HX GYN      vaginal deliveries x3    HI COLONOSCOPY FLX DX W/COLLJ SPEC WHEN PFRMD  6/6/2011         HI ESOPHAGOGASTRODUODENOSCOPY TRANSORAL DIAGNOSTIC  6/6/2011         UPPER GI ENDOSCOPY,BALL DIL,30MM  8/28/2017             Diet prior to admission: unrestricted texture per patient  Current Diet:  Regular texture   Cognitive and Communication Status:  Neurologic State: Alert  Orientation Level: Oriented X4  Cognition: Follows commands  Perception: Appears intact  Perseveration: No perseveration noted  Safety/Judgement: Awareness of environment  Oral Assessment:  Oral Assessment  Dentition: Natural  Oral Hygiene: clean  Lingual: No impairment  Mandible: No impairment  P.O. Trials:  Patient Position: upright in stretcher  Vocal quality prior to P.O.: Low volume  Consistency Presented: Solid; Thin liquid (declined puree)           Bolus Formation/Control:  (slow, needed extra time, liquid wash. Uncued)     Propulsion: No impairment  Oral Residue: None  Initiation of Swallow: No impairment  Laryngeal Elevation: Functional  Aspiration Signs/Symptoms: None  Pharyngeal Phase Characteristics: No impairment, issues, or problems                    NOMS:   The NOMS functional outcome measure was used to quantify this patient's level of swallowing impairment. Based on the NOMS, the patient was determined to be at level 6 for swallow function     NOMS Swallowing Levels:  Level 1 (CN): NPO  Level 2 (CM): NPO but takes consistency in therapy  Level 3 (CL): Takes less than 50% of nutrition p.o. and continues with nonoral feedings; and/or safe with mod cues; and/or max diet restriction  Level 4 (CK): Safe swallow but needs mod cues; and/or mod diet restriction; and/or still requires some nonoral feeding/supplements  Level 5 (CJ): Safe swallow with min diet restriction; and/or needs min cues  Level 6 (CI): Independent with p.o.; rare cues; usually self cues; may need to avoid some foods or needs extra time  Level 7 ([address] [place] [address]): Independent for all p.o.  MORIS. (2003). National Outcomes Measurement System (NOMS): Adult Speech-Language Pathology User's Guide. Pain:           After treatment:   Patient left in no apparent distress in bed    COMMUNICATION/EDUCATION:   Patient was educated regarding her reason for swallow eval as this relates to her diagnosis of CVA workup. She demonstrated Good understanding as evidenced by her responses.     Thank you for this referral.  Dorene Moncada, SLP  Time Calculation: 10 mins

## 2021-12-30 ENCOUNTER — APPOINTMENT (OUTPATIENT)
Dept: NON INVASIVE DIAGNOSTICS | Age: 76
DRG: 103 | End: 2021-12-30
Attending: HOSPITALIST
Payer: MEDICARE

## 2021-12-30 VITALS
WEIGHT: 139.33 LBS | TEMPERATURE: 97.9 F | RESPIRATION RATE: 18 BRPM | OXYGEN SATURATION: 98 % | HEART RATE: 59 BPM | DIASTOLIC BLOOD PRESSURE: 49 MMHG | SYSTOLIC BLOOD PRESSURE: 104 MMHG | HEIGHT: 65 IN | BODY MASS INDEX: 23.21 KG/M2

## 2021-12-30 LAB
ANION GAP SERPL CALC-SCNC: 4 MMOL/L (ref 5–15)
BUN SERPL-MCNC: 19 MG/DL (ref 6–20)
BUN/CREAT SERPL: 17 (ref 12–20)
CALCIUM SERPL-MCNC: 9 MG/DL (ref 8.5–10.1)
CHLORIDE SERPL-SCNC: 108 MMOL/L (ref 97–108)
CO2 SERPL-SCNC: 25 MMOL/L (ref 21–32)
CORTIS AM PEAK SERPL-MCNC: 3.1 UG/DL (ref 4.3–22.45)
CREAT SERPL-MCNC: 1.1 MG/DL (ref 0.55–1.02)
ECHO AO ROOT DIAM: 2.6 CM
ECHO AO ROOT INDEX: 1.53 CM/M2
ECHO AV AREA PEAK VELOCITY: 3.9 CM2
ECHO AV AREA PEAK VELOCITY: 4 CM2
ECHO AV PEAK GRADIENT: 4 MMHG
ECHO AV PEAK VELOCITY: 1 M/S
ECHO AV VELOCITY RATIO: 1.2
ECHO LA DIAMETER INDEX: 1.53 CM/M2
ECHO LA DIAMETER: 2.6 CM
ECHO LA TO AORTIC ROOT RATIO: 1
ECHO LA VOL 2C: 46 ML (ref 22–52)
ECHO LA VOL 4C: 55 ML (ref 22–52)
ECHO LA VOLUME AREA LENGTH: 55 ML
ECHO LA VOLUME INDEX A2C: 27 ML/M2 (ref 16–34)
ECHO LA VOLUME INDEX A4C: 32 ML/M2 (ref 16–34)
ECHO LA VOLUME INDEX AREA LENGTH: 32 ML/M2 (ref 16–34)
ECHO LV E' LATERAL VELOCITY: 6 CM/S
ECHO LV E' SEPTAL VELOCITY: 6 CM/S
ECHO LV FRACTIONAL SHORTENING: 38 % (ref 28–44)
ECHO LV INTERNAL DIMENSION DIASTOLE INDEX: 1.71 CM/M2
ECHO LV INTERNAL DIMENSION DIASTOLIC: 2.9 CM (ref 3.9–5.3)
ECHO LV INTERNAL DIMENSION SYSTOLIC INDEX: 1.06 CM/M2
ECHO LV INTERNAL DIMENSION SYSTOLIC: 1.8 CM
ECHO LV IVSD: 1 CM (ref 0.6–0.9)
ECHO LV IVSS: 1.5 CM
ECHO LV MASS 2D: 97.3 G (ref 67–162)
ECHO LV MASS INDEX 2D: 57.2 G/M2 (ref 43–95)
ECHO LV POSTERIOR WALL DIASTOLIC: 1.3 CM (ref 0.6–0.9)
ECHO LV POSTERIOR WALL SYSTOLIC: 2.4 CM
ECHO LV RELATIVE WALL THICKNESS RATIO: 0.9
ECHO LVOT AREA: 3.5 CM2
ECHO LVOT DIAM: 2.1 CM
ECHO LVOT PEAK GRADIENT: 5 MMHG
ECHO LVOT PEAK VELOCITY: 1.2 M/S
ECHO MV A VELOCITY: 1.01 M/S
ECHO MV E DECELERATION TIME (DT): 299.7 MS
ECHO MV E VELOCITY: 0.72 M/S
ECHO MV E/A RATIO: 0.71
ECHO MV E/E' LATERAL: 12
ECHO MV E/E' RATIO (AVERAGED): 12
ECHO MV E/E' SEPTAL: 12
ECHO PV MAX VELOCITY: 0.8 M/S
ECHO PV PEAK GRADIENT: 2 MMHG
ECHO RV FREE WALL PEAK S': 14 CM/S
ECHO RV INTERNAL DIMENSION: 5 CM
ECHO TV REGURGITANT MAX VELOCITY: 2.36 M/S
ECHO TV REGURGITANT PEAK GRADIENT: 22 MMHG
EST. AVERAGE GLUCOSE BLD GHB EST-MCNC: 126 MG/DL
GLUCOSE SERPL-MCNC: 72 MG/DL (ref 65–100)
HBA1C MFR BLD: 6 % (ref 4–5.6)
IRON SATN MFR SERPL: 39 % (ref 20–50)
IRON SERPL-MCNC: 106 UG/DL (ref 35–150)
POTASSIUM SERPL-SCNC: 5.3 MMOL/L (ref 3.5–5.1)
SARS-COV-2, XPLCVT: NOT DETECTED
SODIUM SERPL-SCNC: 137 MMOL/L (ref 136–145)
SOURCE, COVRS: NORMAL
TIBC SERPL-MCNC: 273 UG/DL (ref 250–450)

## 2021-12-30 PROCEDURE — 97161 PT EVAL LOW COMPLEX 20 MIN: CPT

## 2021-12-30 PROCEDURE — 97530 THERAPEUTIC ACTIVITIES: CPT

## 2021-12-30 PROCEDURE — 97165 OT EVAL LOW COMPLEX 30 MIN: CPT | Performed by: OCCUPATIONAL THERAPIST

## 2021-12-30 PROCEDURE — 97530 THERAPEUTIC ACTIVITIES: CPT | Performed by: OCCUPATIONAL THERAPIST

## 2021-12-30 PROCEDURE — 82533 TOTAL CORTISOL: CPT

## 2021-12-30 PROCEDURE — 83540 ASSAY OF IRON: CPT

## 2021-12-30 PROCEDURE — 83036 HEMOGLOBIN GLYCOSYLATED A1C: CPT

## 2021-12-30 PROCEDURE — 93306 TTE W/DOPPLER COMPLETE: CPT

## 2021-12-30 PROCEDURE — 74011250637 HC RX REV CODE- 250/637: Performed by: NURSE PRACTITIONER

## 2021-12-30 PROCEDURE — 80048 BASIC METABOLIC PNL TOTAL CA: CPT

## 2021-12-30 PROCEDURE — 74011250637 HC RX REV CODE- 250/637: Performed by: HOSPITALIST

## 2021-12-30 PROCEDURE — 74011250636 HC RX REV CODE- 250/636: Performed by: HOSPITALIST

## 2021-12-30 PROCEDURE — 36415 COLL VENOUS BLD VENIPUNCTURE: CPT

## 2021-12-30 PROCEDURE — 99239 HOSP IP/OBS DSCHRG MGMT >30: CPT | Performed by: FAMILY MEDICINE

## 2021-12-30 RX ORDER — ATORVASTATIN CALCIUM 40 MG/1
40 TABLET, FILM COATED ORAL DAILY
Qty: 90 TABLET | Refills: 3 | Status: SHIPPED | OUTPATIENT
Start: 2021-12-30 | End: 2022-07-12 | Stop reason: SDUPTHER

## 2021-12-30 RX ORDER — SODIUM POLYSTYRENE SULFONATE 15 G/60ML
15 SUSPENSION ORAL; RECTAL
Status: COMPLETED | OUTPATIENT
Start: 2021-12-30 | End: 2021-12-30

## 2021-12-30 RX ADMIN — OFLOXACIN 1 DROP: 3 SOLUTION OPHTHALMIC at 10:34

## 2021-12-30 RX ADMIN — ASPIRIN 81 MG: 81 TABLET, COATED ORAL at 10:33

## 2021-12-30 RX ADMIN — ENOXAPARIN SODIUM 40 MG: 100 INJECTION SUBCUTANEOUS at 10:33

## 2021-12-30 RX ADMIN — SODIUM POLYSTYRENE SULFONATE 15 G: 15 SUSPENSION ORAL; RECTAL at 02:01

## 2021-12-30 RX ADMIN — OFLOXACIN 1 DROP: 3 SOLUTION OPHTHALMIC at 14:49

## 2021-12-30 NOTE — PROGRESS NOTES
Transition of Care Plan:    RUR:8% low risk readmission  Disposition: Home alone   Follow up appointments: PCP  DME needed:none indicated at this time   Transportation at Discharge:Pt's daughter between 4-5pm today   Kellen Glow or means to access home:   Pt has access      IM Medicare Letter: to be provided   Is patient a BCPI-A Bundle:   N/a        If yes, was Bundle Letter given?:    Is patient a [place] and connected with the [place]? N/a  If yes, was [employer] transfer form completed and [facility] notified? Caregiver Contact: daughter Tiny Fam: [phone]  Discharge Caregiver contacted prior to discharge? CM has contacted and notified Pt's daughter                  11:10am: This CM notified of Pt discharging today pending LP results. CM contacted Pt's daughter Sumeet Schroeder to notify of potential discharge. Pt's daughter is receptive stating that she plans to be at [address] between 4 and 5pm today. Medicare pt has received, reviewed, and signed 2nd IM letter informing them of their right to appeal the discharge. Signed copy has been placed on pt bedside chart.         Dayan Willis, [facility], CM  [place]

## 2021-12-30 NOTE — PROGRESS NOTES
Received message from patient's nurse stating:    AM labs came back and her potassium is 5.3         Discussion / orders:    Kayexalate 15 g p.o. x1         Please note that this note was dictated using Dragon computer voice recognition software. Quite often unanticipated grammatical, syntax, homophones, and other interpretive errors are inadvertently transcribed by the computer software. Please disregard these errors. Please excuse any errors that have escaped final proofreading.      Signed by:  Vivek Mooney DNP, ACNP-BC

## 2021-12-30 NOTE — PROGRESS NOTES
12/29/21 2341   Vitals   Temp (!) 94.2 °F (34.6 °C)   Temp Source Oral   Pulse (Heart Rate) (!) 51   Resp Rate 18   O2 Sat (%) 100 %   Level of Consciousness Alert (0)   /66   MAP (Calculated) 89   BP 1 Location Right upper arm   BP 1 Method Automatic   BP Patient Position At rest   MEWS Score 3   Box Number hw   Electrodes Replaced No     Cindy hugger ordered for hypothermia

## 2021-12-30 NOTE — PROGRESS NOTES
Physical Therapy    Vitals:    12/30/21 0416 12/30/21 0910 12/30/21 0928 12/30/21 0933   BP: (!) 99/46 (!) 90/48 (!) 94/49 (!) 77/43   BP 1 Location:    Right upper arm   BP Patient Position:    Sitting  Comment: at EOB   Pulse: (!) 57 80 68 73   Temp: 98.1 °F (36.7 °C) 98 °F (36.7 °C) 98.8 °F (37.1 °C)  Comment: just removed darsahna thornton    Resp:  18     Height:       Weight:       SpO2: 98% 98% 96% 95%       Patient with low BP sitting EOB, unable to assess ambulation but patient appears to move well and no focal weakness noted. Expect she will be able to discharge home with family to assist.    Full note to follow.     ARNALDO CamarilloT Please place the order for bloodwork. He will have them drawn next week @ [facility] prior to his appt-amk

## 2021-12-30 NOTE — PROGRESS NOTES
OCCUPATIONAL THERAPY EVALUATION/DISCHARGE  Patient: Radha Figueroa (31 y.o. female)  Date: 12/30/2021  Primary Diagnosis: Sudden onset of severe headache [R51.9]       Precautions: fall/orthostatic hypotension; LUE limb alert due to vascular issues, Droplet Plus to r/o COVID       ASSESSMENT  Based on the objective data described below, the patient presents with impaired VS (warming blanket upon arrival), and orthostatic hypotension which limited participation in adls and mobility this date. Pt reports that she has been up to the bathroom independently. Educated pt on safety due to lines and pt's low BP, encouraging pt to call nursing for assistance. Pt is likely functioning close to her independent in adls baseline, but limited by her current medical condition. She reports that she has no more headache pain and has no concerns re: returning home. Per medical record, work up continues. .    Current Level of Function (ADLs/self-care): OT Evaluation was limited by orthostatic hypotension. Removed warming blanket, temp was 98.8 and pt reported  feeling warm and comfortable. Functional Outcome Measure: The patient scored 60/100 on the Barthel Index outcome measure which is indicative of \" minimally independent\" functioning. Other factors to consider for discharge: pt lives alone, was independent in [address] and IADLs per pt report     PLAN :  Recommend with staff: encourage OOB as BP allows    Recommendation for discharge: (in order for the patient to meet his/her long term goals)  No skilled occupational therapy/ follow up rehabilitation needs identified at this time. This discharge recommendation:  Has not yet been discussed the attending provider and/or case management    IF patient discharges home will need the following DME: none       SUBJECTIVE:   Patient reported that she has no concerns re: returning home.     OBJECTIVE DATA SUMMARY:   HISTORY:   Past Medical History:   Diagnosis Date    Achalasia     GERD (gastroesophageal reflux disease)     Hypercholesterolemia     Hyperparathyroidism [facility])      Past Surgical History:   Procedure Laterality Date    COLONOSCOPY N/A 8/25/2016    COLONOSCOPY performed by Alvaro Grande MD at [facility] LESN,FORCEP/CAUTERY  8/25/2016         COLONOSCOPY,REMV LESN,SNARE  8/25/2016         ENDOSCOPY, COLON, DIAGNOSTIC  6/2006    amanda- nl- but had hx polyp in 2002    HX GYN      vaginal deliveries x3    ND COLONOSCOPY FLX DX W/COLLJ SPEC WHEN PFRMD  6/6/2011         ND ESOPHAGOGASTRODUODENOSCOPY TRANSORAL DIAGNOSTIC  6/6/2011         UPPER GI ENDOSCOPY,BALL DIL,30MM  8/28/2017            Prior Level of Function/Environment/Context: lives alone, drives, independent in [address] and IADLs; reports no history of headaches  Expanded or extensive additional review of patient history:   Home Situation  Home Environment: Private residence  # Steps to Enter: 3  One/Two Story Residence: Two story  Living Alone: Yes  Support Systems: Child(dina),Other Family Member(s)  Patient Expects to be Discharged to[de-identified] House  Current DME Used/Available at Home: None  Tub or Shower Type: Tub/Shower combination      EXAMINATION OF PERFORMANCE DEFICITS:  Cognitive/Behavioral Status:  Neurologic State: Alert; Appropriate for age  Orientation Level: Appropriate for age  Cognition: Appropriate for age attention/concentration; Follows commands (cautioned to call for assistance to go to bathroom)  Perception: Appears intact  Perseveration: No perseveration noted  Safety/Judgement: Awareness of environment;Decreased awareness of need for assistance;Decreased awareness of need for safety; Fall prevention    Skin: generally intact     Edema: none observed    Hearing: Auditory  Auditory Impairment: None    Vision/Perceptual:                           Acuity: Able to read clock/calendar on wall without difficulty; Impaired near vision    Corrective Lenses: Reading glasses    Range of Motion:  BUEs:  Within functional limits                            Strength:  Generalized weakness--no focal weakness                   Coordination:     Fine Motor Skills-Upper: Left Intact; Right Intact    Gross Motor Skills-Upper: Left Intact; Right Intact    Tone & Sensation: Tone is normal  Sensation was not assessed                            Balance:  Sitting: Intact      BP :  77/43 and pt was returned to supine  Standing:  (unable due to low BP)    Functional Mobility and Transfers for ADLs:  Bed Mobility:  Rolling: Supervision  Supine to Sit: Stand-by assistance  Sit to Supine: Stand-by assistance  Scooting: Independent    Transfers:  Sit to Stand:  (unable due to low BP in sitting)  Bathroom Mobility:  (unable due to low BP/pt reports shes been up to bathroom w/o assistance)    ADL Assessment:  Feeding: Independent (poor appetite)    Oral Facial Hygiene/Grooming: Setup    Bathing: Minimum assistance    Upper Body Dressing: Setup    Lower Body Dressing: Setup;Minimum assistance    Toileting: Stand by assistance                ADL Intervention and task modifications:        Limited this date due to pt's low BP once seated EOB                               Cognitive Retraining  Safety/Judgement: Awareness of environment;Decreased awareness of need for assistance;Decreased awareness of need for safety; Fall prevention    Functional Measure:    Barthel Index:  Bathin  Bladder: 10  Bowels: 10  Groomin  Dressin  Feeding: 10  Mobility: 0  Stairs: 5  Toilet Use: 5  Transfer (Bed to Chair and Back): 10 (unable this date due to decr BP)  Total: 60/100      The Barthel ADL Index: Guidelines  1. The index should be used as a record of what a patient does, not as a record of what a patient could do. 2. The main aim is to establish degree of independence from any help, physical or verbal, however minor and for whatever reason. 3. The need for supervision renders the patient not independent.   4. A patient's performance should be established using the best available evidence. Asking the patient, friends/relatives and nurses are the usual sources, but direct observation and common sense are also important. However direct testing is not needed. 5. Usually the patient's performance over the preceding 24-48 hours is important, but occasionally longer periods will be relevant. 6. Middle categories imply that the patient supplies over 50 per cent of the effort. 7. Use of aids to be independent is allowed. Score Interpretation (from [address])    Independent   60-79 Minimally independent   40-59 Partially dependent   20-39 Very dependent   <20 Totally dependent     -Deep Machado., Barthel, D.W. (1965). Functional evaluation: the Barthel Index. [address] ([address] (1997). The Barthel activities of daily living index: self-reporting versus actual performance in the old (> or = 75 years). Journal of [address] [place] [address] 45(7), 14 [facility], KENDALL, Compa Horton., Kyara Julian. (1999). Measuring the change in disability after inpatient rehabilitation; comparison of the responsiveness of the Barthel Index and Functional [place] Measure. Journal of Neurology, Neurosurgery, and Psychiatry, 66(4), 373-096. Sree Gutierrez, N.J.A, TIFFANIE Gross, & Leny Springer M.A. (2004) Assessment of post-stroke quality of life in cost-effectiveness studies: The usefulness of the Barthel Index and the EuroQoL-5D. Quality of Life Research, 15, 142-86       Occupational Therapy Evaluation Charge Determination   History Examination Decision-Making   LOW Complexity : Brief history review  MEDIUM Complexity : 3-5 performance deficits relating to physical, cognitive , or psychosocial skils that result in activity limitations and / or participation restrictions MEDIUM Complexity : Patient may present with comorbidities that affect occupational performnce.  Melida Oliveira to moderate modification of tasks or assistance (eg, physical or verbal ) with assesment(s) is necessary to enable patient to complete evaluation       Based on the above components, the patient evaluation is determined to be of the following complexity level: LOW   Pain Rating:  No complaints of pain. No c/o headache    Activity Tolerance:   Poor and signs and symptoms of orthostatic hypotension  BP upon arrival:  94/49 in semisupine position--temp: 98.8  BP in sitting EOB position:  77/43:  Pt returned to supine due to low BP. After treatment patient left in no apparent distress:    Supine in bed, Call bell within reach, Bed / chair alarm activated, Side rails x 3 and nurse informed    COMMUNICATION/EDUCATION:   The patients plan of care was discussed with: Physical therapist and Registered nurse.      Thank you for this referral.  Santo Ferrer OTR/L  Time Calculation: 29 mins

## 2021-12-30 NOTE — PROGRESS NOTES
General Daily Progress Note    Admit Date: 12/29/2021  Hospital day 2    Subjective:     Patient has no complaint of headache this am. Awoke 2 AM Tuesday with severe thunderclap headache.(worst headache ever) Waited awhile, eventually came to ER, had a noncontrast CT 21 hours later (11:38 pm). No focal neurological symtpoms. Has also been found to have a L subclavian stenosis on imaging. Asymptomatic. ..   Medication side effects: none    Current Facility-Administered Medications   Medication Dose Route Frequency    aspirin delayed-release tablet 81 mg  81 mg Oral DAILY    labetaloL (NORMODYNE;TRANDATE) injection 5 mg  5 mg IntraVENous Q10MIN PRN    enoxaparin (LOVENOX) injection 40 mg  40 mg SubCUTAneous Q24H    fentaNYL citrate (PF) injection 25-50 mcg  25-50 mcg IntraVENous Q4H PRN    oxyCODONE IR (ROXICODONE) tablet 5 mg  5 mg Oral Q4H PRN    ketorolac (TORADOL) injection 15 mg  15 mg IntraVENous Q6H PRN    atorvastatin (LIPITOR) tablet 40 mg  40 mg Oral QHS    butalbital-acetaminophen-caffeine (FIORICET, ESGIC) -40 mg per tablet 1 Tablet  1 Tablet Oral Q6H PRN    ofloxacin (FLOXIN) 0.3 % ophthalmic solution 1 Drop  1 Drop Right Eye QID    acetaminophen (TYLENOL) tablet 650 mg  650 mg Oral Q6H PRN    Or    acetaminophen (TYLENOL) solution 650 mg  650 mg Per NG tube Q6H PRN    Or    acetaminophen (TYLENOL) suppository 650 mg  650 mg Rectal Q6H PRN        Review of Systems  Pertinent items are noted in HPI. Objective:     Patient Vitals for the past 8 hrs:   BP Temp Pulse Resp SpO2   12/30/21 0416 (!) 99/46 98.1 °F (36.7 °C) (!) 57  98 %   12/30/21 0100  97.2 °F (36.2 °C)      12/30/21 0015  (!) 95.3 °F (35.2 °C)      12/29/21 2341 136/66 (!) 94.2 °F (34.6 °C) (!) 51 18 100 %     No intake/output data recorded.   12/28 1901 - 12/30 0700  In: 1743.8 [P.O.:580; I.V.:1163.8]  Out: -     Physical Exam:   Visit Vitals  BP (!) 99/46   Pulse (!) 57   Temp 98.1 °F (36.7 °C)   Resp 18   Ht 5' 5\" (1.651 m)   Wt 139 lb 5.3 oz (63.2 kg)   SpO2 98%   Breastfeeding No   BMI 23.19 kg/m²     Lungs: clear to auscultation bilaterally  Heart: regular rate and rhythm, S1, S2 normal, no murmur, click, rub or gallop  Abdomen: soft, non-tender. Bowel sounds normal. No masses,  no organomegaly  Extremities: extremities normal, atraumatic, no cyanosis or edema  CNs 2-12 intact. Cer- fnf intact. Gross motor intact. Gait normal.       ECG: normal sinus rhythm     Data Review   Recent Results (from the past 24 hour(s))   COVID-19 RAPID TEST    Collection Time: 12/29/21  2:38 PM   Result Value Ref Range    Specimen source Nasopharyngeal      COVID-19 rapid test Not detected NOTD     METABOLIC PANEL, BASIC    Collection Time: 12/30/21 12:01 AM   Result Value Ref Range    Sodium 137 136 - 145 mmol/L    Potassium 5.3 (H) 3.5 - 5.1 mmol/L    Chloride 108 97 - 108 mmol/L    CO2 25 21 - 32 mmol/L    Anion gap 4 (L) 5 - 15 mmol/L    Glucose 72 65 - 100 mg/dL    BUN 19 6 - 20 MG/DL    Creatinine 1.10 (H) 0.55 - 1.02 MG/DL    BUN/Creatinine ratio 17 12 - 20      GFR est AA 58 (L) >60 ml/min/1.73m2    GFR est non-AA 48 (L) >60 ml/min/1.73m2    Calcium 9.0 8.5 - 10.1 MG/DL           Assessment:     Active Problems:    Sudden onset of severe headache (12/29/2021)        Plan:     1. Thunderclap headache- negative CT. Discussed LP with radiology- in view of pt having a negative CTA head, felt that Lp not indicated . Pt well known to me for many years- she doesn't want to be in hospital, but I know wouldn't have come to ER unless there was a major problem. Retired schoolteacher. 2. L subclavian artery stenosis- asymptomatic- outpatient followup. 3. Hyponatremia- reslved  4. Mild hyperkalemia- present since 2011. Disposition- dc home today.

## 2021-12-30 NOTE — PROGRESS NOTES
Just before 0500 I assumed care of this pt from Prior RN Yousif Negron. Tech on unit today alarmed by critical low BP with pt sitting on side of bed with legs dangling after having been laying down previously. It is understood SBP in the 60's are expected on the (L) arm which has a limb alert. So this RN and oncoming primary nurse go to bedside to evaluate pt. Suddenly sitting up pt SBP 68 and after a few minutes she looked dizzy but denies it as she is ready to go home. Laying down pt SBP trend in the 90's or higher. This data suggests pt is  ortho positive and at risk for syncope. Day nurse states she will notify MD as my shift is over.       Manan Garrett, RN

## 2021-12-30 NOTE — PROGRESS NOTES
Problem: Falls - Risk of  Goal: *Absence of Falls  Description: Document Pete Gamboa Fall Risk and appropriate interventions in the flowsheet.   Outcome: Progressing Towards Goal  Note: Fall Risk Interventions:                                Problem: Patient Education: Go to Patient Education Activity  Goal: Patient/Family Education  Outcome: Progressing Towards Goal     Problem: Hypotension  Goal: *Blood pressure within specified parameters  Outcome: Progressing Towards Goal  Goal: *Fluid volume balance  Outcome: Progressing Towards Goal  Goal: *Labs within defined limits  Outcome: Progressing Towards Goal     Problem: Patient Education: Go to Patient Education Activity  Goal: Patient/Family Education  Outcome: Progressing Towards Goal     Problem: Tissue Perfusion - Cardiopulmonary, Altered  Goal: *Optimize tissue perfusion  Outcome: Progressing Towards Goal  Goal: *Absence of hypoxia  Outcome: Progressing Towards Goal     Problem: Patient Education: Go to Patient Education Activity  Goal: Patient/Family Education  Outcome: Progressing Towards Goal

## 2021-12-30 NOTE — PROGRESS NOTES
Vascular Surgery Progress Note  Elijah Akhtar ACNP-BC  12/30/2021       Subjective:     Ana Allen is a 64 y.o.  female with a pmhx significant for HLD, hyperparathyroidism, and GERD. She denies a hx of ASHD, CAD, or DM. She is a former smoker. She presented to the hospital with complaint of acute onset of severe H/A. A CTA of the head and neck is significant for vertebral basilar stenosis, CCA stenosis, and left SCA stenosis. MRI of the brain is unremarkable. She denies arm weakness, tingling, or pain. She reports numbness of the fingers of the bilateral hands that is chronic. This am she is hypotensive. She was significantly orthostatic with PT. She was hypothermic overnight. Rapid COVID testing was negative. Nursing Data:     Patient Vitals for the past 24 hrs:   BP Temp Pulse Resp SpO2   12/30/21 0933 (!) 77/43  73  95 %   12/30/21 0928 (!) 94/49 98.8 °F (37.1 °C) 68  96 %   12/30/21 0910 (!) 90/48 98 °F (36.7 °C) 80 18 98 %   12/30/21 0416 (!) 99/46 98.1 °F (36.7 °C) (!) 57  98 %   12/30/21 0100  97.2 °F (36.2 °C)      12/30/21 0015  (!) 95.3 °F (35.2 °C)      12/29/21 2341 136/66 (!) 94.2 °F (34.6 °C) (!) 51 18 100 %   12/29/21 1911 128/69 97.1 °F (36.2 °C) (!) 58 19 98 %   12/29/21 1823 (!) 147/64 97.5 °F (36.4 °C) (!) 51 13 100 %   12/29/21 1701     100 %   12/29/21 1700 126/68  (!) 48 12    12/29/21 1400 104/79  (!) 55 17    12/29/21 1330   65 14    12/29/21 1300 126/64  (!) 45 13    12/29/21 1130   (!) 51 15 100 %   12/29/21 1100 (!) 109/59  (!) 53 12 95 %         Intake/Output Summary (Last 24 hours) at 12/30/2021 1054  Last data filed at 12/29/2021 2100  Gross per 24 hour   Intake 1243.75 ml   Output    Net 1243.75 ml       Exam:     Physical Exam  HENT:      Head: Normocephalic. Nose: Nose normal.      Mouth/Throat:      Mouth: Mucous membranes are moist.   Eyes:      Pupils: Pupils are equal, round, and reactive to light. Neck:      Vascular: No carotid bruit. Cardiovascular:      Rate and Rhythm: Normal rate and regular rhythm. Pulmonary:      Effort: Pulmonary effort is normal. No respiratory distress. Abdominal:      General: Abdomen is flat. There is no distension. Musculoskeletal:         General: Normal range of motion. Cervical back: Normal range of motion. Skin:     General: Skin is warm. Neurological:      General: No focal deficit present. Mental Status: She is alert. Mental status is at baseline. Psychiatric:         Mood and Affect: Mood normal.         Behavior: Behavior normal.     Lab Review:     . Recent Results (from the past 24 hour(s))   COVID-19 RAPID TEST    Collection Time: 12/29/21  2:38 PM   Result Value Ref Range    Specimen source Nasopharyngeal      COVID-19 rapid test Not detected NOTD     HEMOGLOBIN A1C WITH EAG    Collection Time: 12/30/21 12:01 AM   Result Value Ref Range    Hemoglobin A1c 6.0 (H) 4.0 - 5.6 %    Est. average glucose 183 mg/dL   METABOLIC PANEL, BASIC    Collection Time: 12/30/21 12:01 AM   Result Value Ref Range    Sodium 137 136 - 145 mmol/L    Potassium 5.3 (H) 3.5 - 5.1 mmol/L    Chloride 108 97 - 108 mmol/L    CO2 25 21 - 32 mmol/L    Anion gap 4 (L) 5 - 15 mmol/L    Glucose 72 65 - 100 mg/dL    BUN 19 6 - 20 MG/DL    Creatinine 1.10 (H) 0.55 - 1.02 MG/DL    BUN/Creatinine ratio 17 12 - 20      GFR est AA 58 (L) >60 ml/min/1.73m2    GFR est non-AA 48 (L) >60 ml/min/1.73m2    Calcium 9.0 8.5 - 10.1 MG/DL   CORTISOL, AM    Collection Time: 12/30/21 12:01 AM   Result Value Ref Range    Cortisol, a.m. 3.1 (L) 4.30 - 22.45 ug/dL   IRON PROFILE    Collection Time: 12/30/21 12:01 AM   Result Value Ref Range    Iron 106 35 - 150 ug/dL    TIBC 273 250 - 450 ug/dL    Iron % saturation 39 20 - 50 %          Assessment/Plan:     B/L vertebral, common carotid, and left subclavian stenosis  -MRI of the unremarkable  -Neurology following   · Likely asymptomatic.   Ambur to review CTA and determine plan of care. Likely no need for procedural intervention. Outpatient f/u with Dr. Mely Jensen in sick months with B/L carotid duplex.    · Did not take blood pressure in left arm as result will be erroneous.     Possible adrenal insufficiency/crisis   -recent hx of removal of B/L cataracts (12/8 & 12/15)   -with headache, hypotension, hypothermia, renal insufficiency, hyponatremia, hyperkalemia, hypoglycemia, bradycardia, and orthostatic hypotension  -am cortisol level 3.1    -COVID rapid test negative   Hx of parathyroidism  Hyperlipidemia  - (12/23)  Anemia   -stable  GERD  Pre-diabetic  -HA1c 6.0     · Management of comorbid conditions per primary team.       VTE Prophylaxis:   Held for LP  SCDs per primary team     Disposition:  Home

## 2021-12-30 NOTE — PROGRESS NOTES
Problem: Mobility Impaired (Adult and Pediatric)  Goal: *Acute Goals and Plan of Care (Insert Text)  Description: FUNCTIONAL STATUS PRIOR TO ADMISSION: Patient was ambulatory in the community without AD, driving and shopping    [address]: Patient lives alone, has family nearby that can assist    Physical Therapy Goals  Initiated 12/30/2021  1. Patient will move from supine to sit and sit to supine  in bed with independence within 7 day(s). 2.  Patient will transfer from bed to chair and chair to bed with supervision/set-up using the least restrictive device within 7 day(s). 3.  Patient will perform sit to stand with supervision/set-up within 7 day(s). 4.  Patient will ambulate with supervision/set-up for 200 feet with the least restrictive device within 7 day(s). 5.  Patient will ascend/descend 13 stairs with  handrail(s) with supervision/set-up within 7 day(s). Outcome: Not Met   PHYSICAL THERAPY EVALUATION  Patient: Lenka French (88 y.o. female)  Date: 12/30/2021  Primary Diagnosis: Sudden onset of severe headache [R51.9]       Precautions:       ASSESSMENT  Based on the objective data described below, the patient presents with orthostatic hypotension which limited ability to fully assess mobility. Patient received in bed and agreeable to participate, reports no headache and no other concerns. No focal weakness noted. Patient was able to come to sit EOB with SBA and sitting balance was intact. BP checked and patient was significantly orthostatic (77/43) so returned to supine position. Patient appears to be able to move well, but does live by herself. Recommend increased supervision and HHPT for safety eval.     Current Level of Function Impacting Discharge (mobility/balance): SBA for bed mobility    Functional Outcome Measure:   The patient scored 60/100 on the Barthel  outcome measure which is indicative of minimally independent     Other factors to consider for discharge: falls risk, orthostatic     Patient will benefit from skilled therapy intervention to address the above noted impairments. PLAN :  Recommendations and Planned Interventions: bed mobility training, transfer training, gait training, therapeutic exercises, patient and family training/education and therapeutic activities      Frequency/Duration: Patient will be followed by physical therapy:  4 times a week to address goals. Recommendation for discharge: (in order for the patient to meet his/her long term goals)  Physical therapy at least 2 days/week in the home AND ensure assist and/or supervision for safety with mobility    This discharge recommendation:  Has been made in collaboration with the attending provider and/or case management    IF patient discharges home will need the following DME: to be determined (TBD)         SUBJECTIVE:   Patient stated why can't I get up by myself.     OBJECTIVE DATA SUMMARY:   HISTORY:    Past Medical History:   Diagnosis Date    Achalasia     GERD (gastroesophageal reflux disease)     Hypercholesterolemia     Hyperparathyroidism ([address]      Past Surgical History:   Procedure Laterality Date    COLONOSCOPY N/A 8/25/2016    COLONOSCOPY performed by Ramón Cobb MD at . [facility] LESN,FORCEP/CAUTERY  8/25/2016         COLONOSCOPY,REMV LESN,SNARE  8/25/2016         ENDOSCOPY, COLON, DIAGNOSTIC  6/2006    amanda- nl- but had hx polyp in 2002    HX GYN      vaginal deliveries x3    OR COLONOSCOPY FLX DX W/COLLJ SPEC WHEN PFRMD  6/6/2011         OR ESOPHAGOGASTRODUODENOSCOPY TRANSORAL DIAGNOSTIC  6/6/2011         UPPER GI ENDOSCOPY,ETHAN DIL,30MM  8/28/2017            Personal factors and/or comorbidities impacting plan of care:     Home Situation  Home Environment: Private residence  # Steps to Enter: 3  One/Two Story Residence: Two story  Living Alone: Yes  Support Systems: Child(dina),Other Family Member(s)  Patient Expects to be Discharged to[de-identified] House  Current DME Used/Available at Home: None  Tub or Shower Type: Tub/Shower combination    EXAMINATION/PRESENTATION/DECISION MAKING:   Critical Behavior:  Neurologic State: Alert,Appropriate for age  Orientation Level: Appropriate for age  Cognition: Appropriate for age attention/concentration,Follows commands (cautioned to call for assistance to go to bathroom)  Safety/Judgement: Awareness of environment,Decreased awareness of need for assistance,Decreased awareness of need for safety,Fall prevention  Hearing: Auditory  Auditory Impairment: None  Range Of Motion:  AROM: Within functional limits                       Strength:    Strength: Within functional limits                    Tone & Sensation:   Tone: Normal              Sensation: Impaired               Coordination:  Coordination: Within functional limits  Vision:   Acuity: Able to read clock/calendar on wall without difficulty; Impaired near vision  Corrective Lenses: Reading glasses  Functional Mobility:  Bed Mobility:  Rolling: Supervision  Supine to Sit: Stand-by assistance  Sit to Supine: Stand-by assistance  Scooting: Independent  Transfers:  Sit to Stand:  (unable due to low BP in sitting)                          Balance:   Sitting: Intact  Standing:  (unable due to low BP)  Ambulation/Gait Training:                                                         Stairs:                Functional Measure:  Barthel Index:    Bathin  Bladder: 10  Bowels: 10  Groomin  Dressin  Feeding: 10  Mobility: 0  Stairs: 5  Toilet Use: 5  Transfer (Bed to Chair and Back): 10 (unable this date due to decr BP)  Total: 60/100       The Barthel ADL Index: Guidelines  1. The index should be used as a record of what a patient does, not as a record of what a patient could do. 2. The main aim is to establish degree of independence from any help, physical or verbal, however minor and for whatever reason.   3. The need for supervision renders the patient not independent. 4. A patient's performance should be established using the best available evidence. Asking the patient, friends/relatives and nurses are the usual sources, but direct observation and common sense are also important. However direct testing is not needed. 5. Usually the patient's performance over the preceding 24-48 hours is important, but occasionally longer periods will be relevant. 6. Middle categories imply that the patient supplies over 50 per cent of the effort. 7. Use of aids to be independent is allowed. Score Interpretation (from [facility])    Independent   60-79 Minimally independent   40-59 Partially dependent   20-39 Very dependent   <20 Totally dependent     -Deep Machado., Barthel, DDONAL. (1965). Functional evaluation: the Barthel Index. [address] [facility] ([address] (1997). The Barthel activities of daily living index: self-reporting versus actual performance in the old (> or = 75 years). Journal of [address] [place] [address] 45(7), [facility], KENDALL, Ramón Dsouza., Mitra Arriola. (1999). Measuring the change in disability after inpatient rehabilitation; comparison of the responsiveness of the Barthel Index and Functional [place] Measure. Journal of Neurology, Neurosurgery, and Psychiatry, 66(4), 465-471. [place]GUNJAN, TIFFANIE Gross, & Christophe Donald MScooterA. (2004) Assessment of post-stroke quality of life in cost-effectiveness studies: The usefulness of the Barthel Index and the EuroQoL-5D.  Quality of Life Research, 15, 611-          Physical Therapy Evaluation Charge Determination   History Examination Presentation Decision-Making   LOW Complexity : Zero comorbidities / personal factors that will impact the outcome / POC LOW Complexity : 1-2 Standardized tests and measures addressing body structure, function, activity limitation and / or participation in recreation  LOW Complexity : Stable, uncomplicated  LOW Complexity : FOTO score of       Based on the above components, the patient evaluation is determined to be of the following complexity level: LOW     Pain Rating:      Activity Tolerance:   signs and symptoms of orthostatic hypotension    After treatment patient left in no apparent distress:   Supine in bed, Call bell within reach, Bed / chair alarm activated and Side rails x 3    COMMUNICATION/EDUCATION:   The patients plan of care was discussed with: Occupational therapist and Registered nurse. Fall prevention education was provided and the patient/caregiver indicated understanding. and Patient/family agree to work toward stated goals and plan of care.     Thank you for this referral.  Oswald Torres, PT   Time Calculation: 23 mins

## 2021-12-30 NOTE — PROGRESS NOTES
I have reviewed discharge instructions with the patient. The patient verbalized understanding. Discharged in care of daughter.  Alert and orientated

## 2021-12-30 NOTE — PROGRESS NOTES
1900: Bedside shift change report given to Rona Christian (oncoming nurse) by Jessica Mojica (offgoing nurse). Report included the following information SBAR, ED Summary, Intake/Output, MAR, Recent Results and Cardiac Rhythm sinus pelon. 0020: notified NP about rectal temp of 95- received verbal orders to apply rosalino hugger. 0103: K 5.3- NP notified. Awaiting new orders. 0300: End of Shift Note    Bedside shift change report given to [facility] (oncoming nurse) by Moraima Herrmann RN (offgoing nurse). Report included the following information SBAR, ED Summary, Intake/Output, MAR, Recent Results and Cardiac Rhythm pelon/sinus arrythmia     Shift worked:  night      Shift summary and any significant changes:     pt had rectal temp of 95- rosalino hugger placed on pt and temp is improving. K was 5.3- tx with oral sodium polystyrene. Concerns for physician to address:  see above      Zone phone for oncoming shift:          Activity:  Activity Level: Up with Assistance  Number times ambulated in hallways past shift: 0  Number of times OOB to chair past shift: 0    Cardiac:   Cardiac Monitoring: Yes      Cardiac Rhythm: Sinus Pelon    Access:   Current line(s): PIV     Genitourinary:   Urinary status: voiding    Respiratory:   O2 Device: None (Room air)  Chronic home O2 use?: NO  Incentive spirometer at bedside: YES     GI:  Last Bowel Movement Date:  (PTA)  Current diet:  ADULT DIET Regular; Low Fat/Low Chol/High Fiber/SHREYA; Low Potassium (Less than 3000 mg/day)  Passing flatus: YES  Tolerating current diet: YES       Pain Management:   Patient states pain is manageable on current regimen: YES    Skin:  [place] Score: 23  Interventions: float heels and increase time out of bed    Patient Safety:  Fall Score:  Total Score: 1  Interventions: bed/chair alarm, assistive device (walker, cane, etc), gripper socks and pt to call before getting OOB       Length of Stay:  Expected LOS: - - -  Actual LOS: [address] [facility], RN

## 2022-01-01 NOTE — DISCHARGE SUMMARY
[address] SUMMARY    Name:  Germán Martinez  MR#:  [id number]  :  1945  ACCOUNT #:  [de-identified]  ADMIT DATE:  2021  DISCHARGE DATE:  2021    FINAL DIAGNOSES:  1. Thunderclap headache. 2.  Left subclavian artery stenosis, asymptomatic. 3.  Hyponatremia. 4.  Hyperkalemia. CONSULTATIONS:  Vascular Surgery. FOLLOWUP:  Follow up with me in my office next week. Please note that over 35 minutes were spent on discharge, care, and medicine reconciliation. DISCHARGE MEDICATIONS:  Include Lipitor 40 mg daily, Fioricet p.r.n. headache, ketorolac, prednisolone,  ophthalmic solution. HISTORY OF PRESENT ILLNESS:  The patient is a very pleasant 40-year-old female with a history of hyperlipidemia and a history of intermittent headaches. She woke up at 2:00 a.m. on the morning of admission with sudden onset of severe headache across the forehead, 10/10 in intensity, described as the worst headache she ever had. It was associated with some mild generalized weakness and tender gums. She took two Fioricet tablets, which she takes for intermittent headaches. There was minimal relief. The patient waited all day to see if the headache would resolve and eventually came to the emergency room that evening. She had a CT scan of the head noncontrast, which on my calculation was approximately 21 hours after the onset of headache. The patient was going to be discharged on the night of admission after also having had a negative CTA. However, she woke up with severe headache. Again, she was admitted for further evaluation. She denied any visual changes, nausea, vomiting, pain with mastication. She has had a cold with green nasal discharge. Denied any fever or chills. No new medications or recent illness. The patient does admit to some stress around the [place] time with the death of her  a few years ago.     PAST MEDICAL HISTORY:  Significant for achalasia, gastroesophageal reflux disease, hypercholesterolemia, hyperparathyroidism, chronic hyperkalemia. PAST SURGERY:  Includes colonoscopies and upper GI endoscopies. HABITS:  Smoking:  Former smoker, used to smoke 2-3 cigarettes a day, quit this past Fall. Alcohol, uses rare glass of wine. SOCIAL HISTORY:  The patient is . She lives alone. Has two children. ALLERGIES:  ALENDRONATE. MEDICATIONS ON ADMISSION:  , prednisolone, and ketorolac eye drops as well as atorvastatin 20 mg daily, Fioricet p.r.n. headaches. REVIEW OF SYSTEMS:  Please see HPI. PHYSICAL EXAMINATION:  VITAL SIGNS:  On admission, blood pressure 91/52, pulse 53, temperature 97.3, respiratory rate 12, height 5 feet 5 inches, weight 139. O2 saturation 99% on room air. GENERAL:  Alert, cooperative, in no distress. NECK:  Supple with no signs of meningismus. Symmetrical.  Thyroid nontender. LUNGS:  Clear to auscultation and percussion. CARDIOVASCULAR:  Regular rhythm and rate. No murmurs, thrills, rubs, or gallop. ABDOMEN:  Soft, nontender. EXTREMITIES:  Without edema. No cyanosis or clubbing. NEUROLOGIC:  No facial asymmetry. No aphasia or slurred speech. Symmetrical strength. Cranial nerves II though XII are intact. Cerebellar, finger-to-nose is intact. Gross motor is intact. The patient is alert and oriented x3. Distal pulse are 1+ symmetrical.    HOSPITAL COURSE:  Again, the patient had CT scan of the head done showed no acute intracranial process. CTA of the head and neck did not show any evidence of aneurysm. No carotid artery stenosis. She did have near stenosis and near occlusion of her left subclavian artery, moderate-to-severe stenosis of proximal right vertebral artery and severe stenosis near occlusion of the proximal V4 segment on the left. The patient also had an MRI of the brain with and without contrast, no acute findings, no mass, minimal nonspecific white matter changes.   On following day, was feeling much better, her headache had resolved. She was very anxious to go home. LABORATORY DATA:  On admission showed a hemoglobin of 9.7 with normochromic normocytic indices. Platelet count and white count were normal.  Sodium 132 on admission, potassium 5.3, BUN 23, creatinine 1.04, otherwise normal.  A1c was 6. Followup sodium was 137. DISCHARGE INSTRUCTIONS:  The patient was stable and discharged home on 12/30/2021 and follow up with me in my office next week.         Song Fernández MD [id number]  D:  12/31/2021 10:35  T:  01/01/2022 0:36  12/31/2021 23:09  JOB #:  [id number]

## 2022-01-12 ENCOUNTER — OFFICE VISIT (OUTPATIENT)
Dept: FAMILY MEDICINE CLINIC | Age: 77
End: 2022-01-12
Payer: MEDICARE

## 2022-01-12 VITALS
RESPIRATION RATE: 16 BRPM | HEIGHT: 65 IN | SYSTOLIC BLOOD PRESSURE: 100 MMHG | HEART RATE: 52 BPM | BODY MASS INDEX: 22.16 KG/M2 | WEIGHT: 133 LBS | TEMPERATURE: 96.6 F | DIASTOLIC BLOOD PRESSURE: 60 MMHG

## 2022-01-12 DIAGNOSIS — I65.03 STENOSIS OF BOTH VERTEBRAL ARTERIES: ICD-10-CM

## 2022-01-12 DIAGNOSIS — I77.1 STENOSIS OF LEFT SUBCLAVIAN ARTERY (HCC): Primary | ICD-10-CM

## 2022-01-12 DIAGNOSIS — G44.53 THUNDERCLAP HEADACHE: ICD-10-CM

## 2022-01-12 PROCEDURE — 99214 OFFICE O/P EST MOD 30 MIN: CPT | Performed by: FAMILY MEDICINE

## 2022-01-12 PROCEDURE — G8427 DOCREV CUR MEDS BY ELIG CLIN: HCPCS | Performed by: FAMILY MEDICINE

## 2022-01-12 RX ORDER — ASPIRIN 81 MG/1
81 TABLET ORAL DAILY
Qty: 90 TABLET | Refills: 3
Start: 2022-01-12

## 2022-01-12 NOTE — PROGRESS NOTES
HISTORY OF PRESENT ILLNESS  Neelima Dawson is a 64 y.o. female. HPI In for [facility] visit. Was admitted in late December for a thunderclap headache. Had a negative CTA of the brain. No further headaches since discharge. No hx of L arm pain or fatigue with use. WAS also found to have a L subclavian artery stenosis, but is asymptomatic- no L arm pain, wekaness. ROS    Physical Exam  Vitals and nursing note reviewed. Constitutional:       Appearance: She is well-developed. HENT:      Right Ear: External ear normal.      Left Ear: External ear normal.   Neck:      Thyroid: No thyromegaly. Cardiovascular:      Rate and Rhythm: Normal rate and regular rhythm. Heart sounds: Normal heart sounds. Comments: bp 100/50 R arm, 80/40 In L arm  Pulmonary:      Breath sounds: Normal breath sounds. No wheezing. Abdominal:      General: Bowel sounds are normal. There is no distension. Palpations: Abdomen is soft. There is no mass. Tenderness: There is no abdominal tenderness. Lymphadenopathy:      Cervical: No cervical adenopathy. ASSESSMENT and PLAN  Orders Placed This Encounter    aspirin delayed-release 81 mg tablet     Diagnoses and all orders for this visit:    1. Stenosis of left subclavian artery (HCC)    2. Thunderclap headache    3. Stenosis of both vertebral arteries    Other orders  -     aspirin delayed-release 81 mg tablet; Take 1 Tablet by mouth daily. Follow-up and Dispositions    · Return in about 6 months (around 7/12/2022). Continue atorvastatin 40 mg daily. Continued off of cigarettes.

## 2022-01-12 NOTE — PROGRESS NOTES
Identified pt with two pt identifiers(name and ). Reviewed record in preparation for visit and have obtained necessary documentation. Chief Complaint   Patient presents with   [facility] Follow Up     2021 - 2021: ED [facility] for severe headache; pt states she has not had any headaches since discharge        Vitals:    22 1105   BP: (!) 76/49   Pulse: (!) 52   Resp: 16   Temp: (!) 96.6 °F (35.9 °C)   TempSrc: Oral   Weight: 133 lb (60.3 kg)   Height: 5' 5\" (1.651 m)   PainSc:   0 - No pain       Health Maintenance Due   Topic    Hepatitis C Screening     Shingrix Vaccine Age 50> (1 of 2)       Coordination of Care Questionnaire:  :   1) Have you been to an emergency room, urgent care, or hospitalized since your last visit? If yes, where when, and reason for visit? yes   See today's reason for visit    2. Have seen or consulted any other health care provider since your last visit? If yes, where when, and reason for visit? NO      Patient is accompanied by self I have received verbal consent from Ana Allen to discuss any/all medical information while they are present in the room.

## 2022-03-18 PROBLEM — E78.00 HYPERCHOLESTEROLEMIA: Status: ACTIVE | Noted: 2017-12-11

## 2022-03-19 PROBLEM — R51.9 SUDDEN ONSET OF SEVERE HEADACHE: Status: ACTIVE | Noted: 2021-12-29

## 2022-07-12 ENCOUNTER — OFFICE VISIT (OUTPATIENT)
Dept: FAMILY MEDICINE CLINIC | Age: 77
End: 2022-07-12
Payer: MEDICARE

## 2022-07-12 VITALS
HEIGHT: 65 IN | HEART RATE: 75 BPM | SYSTOLIC BLOOD PRESSURE: 81 MMHG | DIASTOLIC BLOOD PRESSURE: 50 MMHG | OXYGEN SATURATION: 98 % | TEMPERATURE: 97 F | BODY MASS INDEX: 22.33 KG/M2 | RESPIRATION RATE: 16 BRPM | WEIGHT: 134 LBS

## 2022-07-12 DIAGNOSIS — E78.00 HYPERCHOLESTEROLEMIA: Primary | ICD-10-CM

## 2022-07-12 DIAGNOSIS — E21.3 HYPERPARATHYROIDISM (HCC): ICD-10-CM

## 2022-07-12 DIAGNOSIS — I77.1 SUBCLAVIAN ARTERY STENOSIS, LEFT (HCC): ICD-10-CM

## 2022-07-12 PROBLEM — N18.30 CHRONIC RENAL DISEASE, STAGE III (HCC): Status: ACTIVE | Noted: 2022-07-12

## 2022-07-12 PROCEDURE — 1101F PT FALLS ASSESS-DOCD LE1/YR: CPT | Performed by: FAMILY MEDICINE

## 2022-07-12 PROCEDURE — 1123F ACP DISCUSS/DSCN MKR DOCD: CPT | Performed by: FAMILY MEDICINE

## 2022-07-12 PROCEDURE — 99213 OFFICE O/P EST LOW 20 MIN: CPT | Performed by: FAMILY MEDICINE

## 2022-07-12 PROCEDURE — G8427 DOCREV CUR MEDS BY ELIG CLIN: HCPCS | Performed by: FAMILY MEDICINE

## 2022-07-12 PROCEDURE — G8420 CALC BMI NORM PARAMETERS: HCPCS | Performed by: FAMILY MEDICINE

## 2022-07-12 PROCEDURE — 1090F PRES/ABSN URINE INCON ASSESS: CPT | Performed by: FAMILY MEDICINE

## 2022-07-12 PROCEDURE — G8536 NO DOC ELDER MAL SCRN: HCPCS | Performed by: FAMILY MEDICINE

## 2022-07-12 PROCEDURE — G8510 SCR DEP NEG, NO PLAN REQD: HCPCS | Performed by: FAMILY MEDICINE

## 2022-07-12 PROCEDURE — G8399 PT W/DXA RESULTS DOCUMENT: HCPCS | Performed by: FAMILY MEDICINE

## 2022-07-12 RX ORDER — ATORVASTATIN CALCIUM 40 MG/1
40 TABLET, FILM COATED ORAL DAILY
Qty: 90 TABLET | Refills: 3 | Status: SHIPPED | OUTPATIENT
Start: 2022-07-12

## 2022-07-12 NOTE — PROGRESS NOTES
HISTORY OF PRESENT ILLNESS  Jordon Love is a 76 y.o. female. HPI In for followup. No severe headaches since last January. Has been in [facility] for the last 3 months, staying with son. He is a supervisor at a [employer]. He has 3 children 6,9, and 13. Gets some mild tiredness in l arm if uses it a lot. ROS    Physical Exam  Vitals and nursing note reviewed. Constitutional:       Appearance: She is well-developed. HENT:      Right Ear: External ear normal.      Left Ear: External ear normal.   Neck:      Thyroid: No thyromegaly. Cardiovascular:      Rate and Rhythm: Normal rate and regular rhythm. Heart sounds: Normal heart sounds. Comments: Absent pulse L arm. L subclavian bruit present  Pulmonary:      Breath sounds: Normal breath sounds. No wheezing. Abdominal:      General: Bowel sounds are normal. There is no distension. Palpations: Abdomen is soft. There is no mass. Tenderness: There is no abdominal tenderness. Lymphadenopathy:      Cervical: No cervical adenopathy. ASSESSMENT and PLAN  Orders Placed This Encounter    CBC WITH AUTOMATED DIFF    METABOLIC PANEL, COMPREHENSIVE    LIPID PANEL    atorvastatin (LIPITOR) 40 mg tablet     Diagnoses and all orders for this visit:    1. Hypercholesterolemia  -     CBC WITH AUTOMATED DIFF; Future  -     METABOLIC PANEL, COMPREHENSIVE; Future  -     LIPID PANEL; Future    2. Hyperparathyroidism ([address]    3. Subclavian artery stenosis, left ([address]    Other orders  -     atorvastatin (LIPITOR) 40 mg tablet; Take 1 Tablet by mouth daily.  For cholesterol

## 2022-07-12 NOTE — PROGRESS NOTES
Chief Complaint   Patient presents with    Cholesterol Problem    Thyroid Problem    Follow-up       1. \"Have you been to the ER, urgent care clinic since your last visit? Hospitalized since your last visit? \" No    2. \"Have you seen or consulted any other health care providers outside of the [address] [place] [address] since your last visit? \" No     3. For patients aged 39-70: Has the patient had a colonoscopy / FIT/ Cologuard? NA - based on age      If the patient is female:    4. For patients aged 41-77: Has the patient had a mammogram within the past 2 years? NA - based on age or sex      11. For patients aged 21-65: Has the patient had a pap smear?  NA - based on age or sex    Health Maintenance Due   Topic Date Due    Hepatitis C Screening  Never done    Shingrix Vaccine Age 50> (1 of 2) Never done

## 2022-07-13 LAB
ALBUMIN SERPL-MCNC: 3.8 G/DL (ref 3.5–5)
ALBUMIN/GLOB SERPL: 1 {RATIO} (ref 1.1–2.2)
ALP SERPL-CCNC: 108 U/L (ref 45–117)
ALT SERPL-CCNC: 28 U/L (ref 12–78)
ANION GAP SERPL CALC-SCNC: 4 MMOL/L (ref 5–15)
AST SERPL-CCNC: 28 U/L (ref 15–37)
BASOPHILS # BLD: 0 K/UL (ref 0–0.1)
BASOPHILS NFR BLD: 1 % (ref 0–1)
BILIRUB SERPL-MCNC: 0.2 MG/DL (ref 0.2–1)
BUN SERPL-MCNC: 25 MG/DL (ref 6–20)
BUN/CREAT SERPL: 22 (ref 12–20)
CALCIUM SERPL-MCNC: 9.5 MG/DL (ref 8.5–10.1)
CHLORIDE SERPL-SCNC: 110 MMOL/L (ref 97–108)
CHOLEST SERPL-MCNC: 157 MG/DL
CO2 SERPL-SCNC: 26 MMOL/L (ref 21–32)
CREAT SERPL-MCNC: 1.12 MG/DL (ref 0.55–1.02)
DIFFERENTIAL METHOD BLD: ABNORMAL
EOSINOPHIL # BLD: 0.2 K/UL (ref 0–0.4)
EOSINOPHIL NFR BLD: 3 % (ref 0–7)
ERYTHROCYTE [DISTWIDTH] IN BLOOD BY AUTOMATED COUNT: 14.8 % (ref 11.5–14.5)
GLOBULIN SER CALC-MCNC: 3.8 G/DL (ref 2–4)
GLUCOSE SERPL-MCNC: 74 MG/DL (ref 65–100)
HCT VFR BLD AUTO: 34.3 % (ref 35–47)
HDLC SERPL-MCNC: 59 MG/DL
HDLC SERPL: 2.7 {RATIO} (ref 0–5)
HGB BLD-MCNC: 10.7 G/DL (ref 11.5–16)
IMM GRANULOCYTES # BLD AUTO: 0 K/UL (ref 0–0.04)
IMM GRANULOCYTES NFR BLD AUTO: 0 % (ref 0–0.5)
LDLC SERPL CALC-MCNC: 81.2 MG/DL (ref 0–100)
LYMPHOCYTES # BLD: 2.6 K/UL (ref 0.8–3.5)
LYMPHOCYTES NFR BLD: 33 % (ref 12–49)
MCH RBC QN AUTO: 30.4 PG (ref 26–34)
MCHC RBC AUTO-ENTMCNC: 31.2 G/DL (ref 30–36.5)
MCV RBC AUTO: 97.4 FL (ref 80–99)
MONOCYTES # BLD: 0.5 K/UL (ref 0–1)
MONOCYTES NFR BLD: 7 % (ref 5–13)
NEUTS SEG # BLD: 4.5 K/UL (ref 1.8–8)
NEUTS SEG NFR BLD: 56 % (ref 32–75)
NRBC # BLD: 0 K/UL (ref 0–0.01)
NRBC BLD-RTO: 0 PER 100 WBC
PLATELET # BLD AUTO: 186 K/UL (ref 150–400)
PMV BLD AUTO: 12.2 FL (ref 8.9–12.9)
POTASSIUM SERPL-SCNC: 5.8 MMOL/L (ref 3.5–5.1)
PROT SERPL-MCNC: 7.6 G/DL (ref 6.4–8.2)
RBC # BLD AUTO: 3.52 M/UL (ref 3.8–5.2)
SODIUM SERPL-SCNC: 140 MMOL/L (ref 136–145)
TRIGL SERPL-MCNC: 84 MG/DL (ref ?–150)
VLDLC SERPL CALC-MCNC: 16.8 MG/DL
WBC # BLD AUTO: 7.9 K/UL (ref 3.6–11)

## 2022-07-18 RX ORDER — FUROSEMIDE 20 MG/1
TABLET ORAL
Qty: 30 TABLET | Refills: 5 | Status: SHIPPED | OUTPATIENT
Start: 2022-07-18

## 2022-07-18 NOTE — PROGRESS NOTES
pc with pt. Will start lasix 20 mg daily and to do a low potassium diet. Recheck potassium in 4 weeks. If no better, refer nephrology. Dale Boland

## 2022-08-31 ENCOUNTER — OFFICE VISIT (OUTPATIENT)
Dept: FAMILY MEDICINE CLINIC | Age: 77
End: 2022-08-31
Payer: MEDICARE

## 2022-08-31 VITALS
RESPIRATION RATE: 16 BRPM | WEIGHT: 132 LBS | TEMPERATURE: 97.2 F | HEIGHT: 65 IN | OXYGEN SATURATION: 100 % | SYSTOLIC BLOOD PRESSURE: 98 MMHG | DIASTOLIC BLOOD PRESSURE: 57 MMHG | BODY MASS INDEX: 21.99 KG/M2 | HEART RATE: 62 BPM

## 2022-08-31 DIAGNOSIS — E87.5 HYPERKALEMIA: Primary | ICD-10-CM

## 2022-08-31 PROCEDURE — 99213 OFFICE O/P EST LOW 20 MIN: CPT | Performed by: FAMILY MEDICINE

## 2022-08-31 PROCEDURE — G8420 CALC BMI NORM PARAMETERS: HCPCS | Performed by: FAMILY MEDICINE

## 2022-08-31 PROCEDURE — G8427 DOCREV CUR MEDS BY ELIG CLIN: HCPCS | Performed by: FAMILY MEDICINE

## 2022-08-31 PROCEDURE — 1090F PRES/ABSN URINE INCON ASSESS: CPT | Performed by: FAMILY MEDICINE

## 2022-08-31 PROCEDURE — 1101F PT FALLS ASSESS-DOCD LE1/YR: CPT | Performed by: FAMILY MEDICINE

## 2022-08-31 PROCEDURE — 1123F ACP DISCUSS/DSCN MKR DOCD: CPT | Performed by: FAMILY MEDICINE

## 2022-08-31 PROCEDURE — G8399 PT W/DXA RESULTS DOCUMENT: HCPCS | Performed by: FAMILY MEDICINE

## 2022-08-31 PROCEDURE — G8510 SCR DEP NEG, NO PLAN REQD: HCPCS | Performed by: FAMILY MEDICINE

## 2022-08-31 PROCEDURE — G8536 NO DOC ELDER MAL SCRN: HCPCS | Performed by: FAMILY MEDICINE

## 2022-08-31 NOTE — PROGRESS NOTES
HISTORY OF PRESENT ILLNESS  Melvi Bedolla is a 76 y.o. female. HPI in for potassium recheck. We recently started lasix. No dizziness with standing. No complaints of chest pain, shortness of breath, TIAs, claudication or edema. ROS    Physical Exam  Vitals and nursing note reviewed. Constitutional:       Appearance: She is well-developed. HENT:      Right Ear: External ear normal.      Left Ear: External ear normal.   Neck:      Thyroid: No thyromegaly. Cardiovascular:      Rate and Rhythm: Normal rate and regular rhythm. Heart sounds: Normal heart sounds. Pulmonary:      Breath sounds: Normal breath sounds. No wheezing. Abdominal:      General: Bowel sounds are normal. There is no distension. Palpations: Abdomen is soft. There is no mass. Tenderness: There is no abdominal tenderness. Lymphadenopathy:      Cervical: No cervical adenopathy. ASSESSMENT and PLAN  Orders Placed This Encounter    METABOLIC PANEL, BASIC     Diagnoses and all orders for this visit:    1.  Hyperkalemia  -     METABOLIC PANEL, BASIC; Future        Will have come back in one week for cosyntropin stimulation test

## 2022-08-31 NOTE — PROGRESS NOTES
Chief Complaint   Patient presents with    Cholesterol Problem    Thyroid Problem    Follow Up Chronic Condition       1. \"Have you been to the ER, urgent care clinic since your last visit? Hospitalized since your last visit? \" No    2. \"Have you seen or consulted any other health care providers outside of the [address] [place] [address] since your last visit? \" No     3. For patients aged 39-70: Has the patient had a colonoscopy / FIT/ Cologuard? NA - based on age      If the patient is female:    4. For patients aged 41-77: Has the patient had a mammogram within the past 2 years? NA - based on age or sex      11. For patients aged 21-65: Has the patient had a pap smear?  NA - based on age or sex    Health Maintenance Due   Topic Date Due    Hepatitis C Screening  Never done    Shingrix Vaccine Age 49> (1 of 2) Never done    COVID-19 Vaccine (4 - Booster for Anguiano Peter series) 02/04/2022

## 2022-09-01 LAB
ANION GAP SERPL CALC-SCNC: 5 MMOL/L (ref 5–15)
BUN SERPL-MCNC: 28 MG/DL (ref 6–20)
BUN/CREAT SERPL: 21 (ref 12–20)
CALCIUM SERPL-MCNC: 9.4 MG/DL (ref 8.5–10.1)
CHLORIDE SERPL-SCNC: 100 MMOL/L (ref 97–108)
CO2 SERPL-SCNC: 28 MMOL/L (ref 21–32)
CREAT SERPL-MCNC: 1.31 MG/DL (ref 0.55–1.02)
GLUCOSE SERPL-MCNC: 78 MG/DL (ref 65–100)
POTASSIUM SERPL-SCNC: 4.8 MMOL/L (ref 3.5–5.1)
SODIUM SERPL-SCNC: 133 MMOL/L (ref 136–145)

## 2022-09-02 ENCOUNTER — OFFICE VISIT (OUTPATIENT)
Dept: FAMILY MEDICINE CLINIC | Age: 77
End: 2022-09-02
Payer: MEDICARE

## 2022-09-02 DIAGNOSIS — E27.40 ADRENAL INSUFFICIENCY (HCC): Primary | ICD-10-CM

## 2022-09-02 NOTE — PROGRESS NOTES
Patient here for acth and corisol a.m.  lab draw. Patient was then given IM injection of cosyntropin 0.25mg reconstituted with 1 ml on normal saline and given IM in right deltoid. Patient placed in waiting room for observation and repeat cortisol level one hour after injection. Cortisol level drawn one hour after injection. Patient notes finger tips slightly tingly. Provider notified and evaluated. Patient instructed to drink plenty of water to help flush injection out of system  Will notify patient once results received.

## 2022-09-03 LAB
CORTIS AM PEAK SERPL-MCNC: 27.4 UG/DL (ref 4.3–22.45)
CORTIS SERPL-MCNC: 32.4 UG/DL

## 2022-09-08 ENCOUNTER — TELEPHONE (OUTPATIENT)
Dept: FAMILY MEDICINE CLINIC | Age: 77
End: 2022-09-08

## 2022-09-08 NOTE — TELEPHONE ENCOUNTER
Pc with pt. Cortisol levels were normal. She has also been on a low potassium diet and potassium is down to 4.8.

## 2022-12-07 RX ORDER — FUROSEMIDE 20 MG/1
TABLET ORAL
Qty: 90 TABLET | Refills: 2 | Status: SHIPPED | OUTPATIENT
Start: 2022-12-07

## 2022-12-22 ENCOUNTER — OFFICE VISIT (OUTPATIENT)
Dept: FAMILY MEDICINE CLINIC | Age: 77
End: 2022-12-22
Payer: MEDICARE

## 2022-12-22 VITALS
SYSTOLIC BLOOD PRESSURE: 68 MMHG | HEIGHT: 65 IN | TEMPERATURE: 97.7 F | DIASTOLIC BLOOD PRESSURE: 54 MMHG | RESPIRATION RATE: 16 BRPM | WEIGHT: 130 LBS | BODY MASS INDEX: 21.66 KG/M2 | HEART RATE: 60 BPM

## 2022-12-22 DIAGNOSIS — E87.5 HYPERKALEMIA: ICD-10-CM

## 2022-12-22 DIAGNOSIS — E78.00 HYPERCHOLESTEROLEMIA: ICD-10-CM

## 2022-12-22 DIAGNOSIS — Z12.31 ENCOUNTER FOR SCREENING MAMMOGRAM FOR MALIGNANT NEOPLASM OF BREAST: ICD-10-CM

## 2022-12-22 DIAGNOSIS — Z00.00 ENCOUNTER FOR MEDICARE ANNUAL WELLNESS EXAM: Primary | ICD-10-CM

## 2022-12-22 LAB
ALBUMIN SERPL-MCNC: 3.8 G/DL (ref 3.5–5)
ALBUMIN/GLOB SERPL: 1 {RATIO} (ref 1.1–2.2)
ALP SERPL-CCNC: 132 U/L (ref 45–117)
ALT SERPL-CCNC: 34 U/L (ref 12–78)
ANION GAP SERPL CALC-SCNC: 3 MMOL/L (ref 5–15)
AST SERPL-CCNC: 31 U/L (ref 15–37)
BILIRUB SERPL-MCNC: 0.2 MG/DL (ref 0.2–1)
BUN SERPL-MCNC: 38 MG/DL (ref 6–20)
BUN/CREAT SERPL: 25 (ref 12–20)
CALCIUM SERPL-MCNC: 9 MG/DL (ref 8.5–10.1)
CHLORIDE SERPL-SCNC: 107 MMOL/L (ref 97–108)
CHOLEST SERPL-MCNC: 170 MG/DL
CO2 SERPL-SCNC: 29 MMOL/L (ref 21–32)
CREAT SERPL-MCNC: 1.5 MG/DL (ref 0.55–1.02)
GLOBULIN SER CALC-MCNC: 3.8 G/DL (ref 2–4)
GLUCOSE SERPL-MCNC: 82 MG/DL (ref 65–100)
HDLC SERPL-MCNC: 74 MG/DL
HDLC SERPL: 2.3 {RATIO} (ref 0–5)
LDLC SERPL CALC-MCNC: 79.4 MG/DL (ref 0–100)
POTASSIUM SERPL-SCNC: 5.1 MMOL/L (ref 3.5–5.1)
PROT SERPL-MCNC: 7.6 G/DL (ref 6.4–8.2)
SODIUM SERPL-SCNC: 139 MMOL/L (ref 136–145)
TRIGL SERPL-MCNC: 83 MG/DL (ref ?–150)
VLDLC SERPL CALC-MCNC: 16.6 MG/DL

## 2022-12-22 NOTE — PROGRESS NOTES
Chief Complaint   Patient presents with    Annual Wellness Visit       1. \"Have you been to the ER, urgent care clinic since your last visit? Hospitalized since your last visit? \" No    2. \"Have you seen or consulted any other health care providers outside of the [address] [place] [address] since your last visit? \" No     3. For patients aged 39-70: Has the patient had a colonoscopy / FIT/ Cologuard? NA - based on age      If the patient is female:    4. For patients aged 41-77: Has the patient had a mammogram within the past 2 years? NA - based on age or sex      11. For patients aged 21-65: Has the patient had a pap smear?  NA - based on age or sex    Health Maintenance Due   Topic Date Due    Hepatitis C Screening  Never done    Shingles Vaccine (1 of 2) Never done

## 2022-12-22 NOTE — PROGRESS NOTES
HISTORY OF PRESENT ILLNESS  Lucia Kelley is a 76 y.o. female. HPI In for cholesterol check. Having some numbness in hands and feet recently. In for potassium and cholesterol check. No muscle cramps. Needs medicare wellness exam. Not seeing any other doctors. Needs mammogram. UTD on vaccines except shingles. Had a covid booster in October. Would want daughter Hilario Armstrong, to be her mPOA if she became incapacitated. Review of Systems   HENT:  Negative for hearing loss. Neurological:         No falls. , canes. Independent in all adls. Memory ok. Psychiatric/Behavioral:          Some sadness at times. No alcohol. Physical Exam  Vitals and nursing note reviewed. Constitutional:       Appearance: She is well-developed. HENT:      Right Ear: External ear normal.      Left Ear: External ear normal.   Neck:      Thyroid: No thyromegaly. Cardiovascular:      Rate and Rhythm: Normal rate and regular rhythm. Heart sounds: Normal heart sounds. Pulmonary:      Breath sounds: Normal breath sounds. No wheezing. Abdominal:      General: Bowel sounds are normal. There is no distension. Palpations: Abdomen is soft. There is no mass. Tenderness: There is no abdominal tenderness. Lymphadenopathy:      Cervical: No cervical adenopathy. ASSESSMENT and PLAN  Orders Placed This Encounter    [facility] MAMMO BI SCREENING INCL CAD    METABOLIC PANEL, COMPREHENSIVE    LIPID PANEL     Diagnoses and all orders for this visit:    1. Encounter for Medicare annual wellness exam    2. Encounter for screening mammogram for malignant neoplasm of breast  -     [facility] MAMMO BI SCREENING INCL CAD; Future    3. Hypercholesterolemia  -     LIPID PANEL; Future    4. Hyperkalemia  -     METABOLIC PANEL, COMPREHENSIVE; Future      Follow-up and Dispositions    Return in about 6 months (around 6/22/2023).

## 2022-12-26 ENCOUNTER — TELEPHONE (OUTPATIENT)
Dept: FAMILY MEDICINE CLINIC | Age: 77
End: 2022-12-26

## 2023-02-09 ENCOUNTER — HOSPITAL ENCOUNTER (OUTPATIENT)
Dept: MAMMOGRAPHY | Age: 78
Discharge: HOME | End: 2023-02-09
Attending: FAMILY MEDICINE
Payer: MEDICARE

## 2023-02-09 DIAGNOSIS — Z12.31 ENCOUNTER FOR SCREENING MAMMOGRAM FOR MALIGNANT NEOPLASM OF BREAST: ICD-10-CM

## 2023-02-09 PROCEDURE — 77063 BREAST TOMOSYNTHESIS BI: CPT

## 2023-02-28 ENCOUNTER — TELEPHONE (OUTPATIENT)
Dept: FAMILY MEDICINE CLINIC | Age: 78
End: 2023-02-28

## 2023-02-28 NOTE — TELEPHONE ENCOUNTER
Patient states that at 12/22/22 office visit Dr. Dayne Jeffery told her to come back in 6 months. Patient has an appt on schedule for 6/22/23. Will cancel today's appt.

## 2023-02-28 NOTE — TELEPHONE ENCOUNTER
Patient called requesting a call back from the nurse to discuss if she needs to keep her appointment for today at 3:00pm. She can be reached at [phone].

## 2023-06-22 ENCOUNTER — OFFICE VISIT (OUTPATIENT)
Age: 78
End: 2023-06-22
Payer: MEDICARE

## 2023-06-22 VITALS
HEIGHT: 65 IN | WEIGHT: 130 LBS | RESPIRATION RATE: 18 BRPM | SYSTOLIC BLOOD PRESSURE: 111 MMHG | HEART RATE: 58 BPM | TEMPERATURE: 97.3 F | BODY MASS INDEX: 21.66 KG/M2 | DIASTOLIC BLOOD PRESSURE: 48 MMHG

## 2023-06-22 DIAGNOSIS — E78.00 PURE HYPERCHOLESTEROLEMIA, UNSPECIFIED: Primary | ICD-10-CM

## 2023-06-22 DIAGNOSIS — M17.12 PRIMARY OSTEOARTHRITIS OF LEFT KNEE: ICD-10-CM

## 2023-06-22 PROCEDURE — G8427 DOCREV CUR MEDS BY ELIG CLIN: HCPCS | Performed by: FAMILY MEDICINE

## 2023-06-22 PROCEDURE — 99213 OFFICE O/P EST LOW 20 MIN: CPT | Performed by: FAMILY MEDICINE

## 2023-06-22 PROCEDURE — G8399 PT W/DXA RESULTS DOCUMENT: HCPCS | Performed by: FAMILY MEDICINE

## 2023-06-22 PROCEDURE — 1090F PRES/ABSN URINE INCON ASSESS: CPT | Performed by: FAMILY MEDICINE

## 2023-06-22 PROCEDURE — G8420 CALC BMI NORM PARAMETERS: HCPCS | Performed by: FAMILY MEDICINE

## 2023-06-22 PROCEDURE — 1123F ACP DISCUSS/DSCN MKR DOCD: CPT | Performed by: FAMILY MEDICINE

## 2023-06-22 PROCEDURE — 1036F TOBACCO NON-USER: CPT | Performed by: FAMILY MEDICINE

## 2023-06-22 RX ORDER — ATORVASTATIN CALCIUM 40 MG/1
40 TABLET, FILM COATED ORAL DAILY
Qty: 90 TABLET | Refills: 12 | Status: SHIPPED | OUTPATIENT
Start: 2023-06-22

## 2023-06-22 RX ORDER — FUROSEMIDE 20 MG/1
TABLET ORAL
Qty: 90 TABLET | Refills: 5 | Status: SHIPPED | OUTPATIENT
Start: 2023-06-22

## 2023-06-22 SDOH — ECONOMIC STABILITY: HOUSING INSECURITY
IN THE LAST 12 MONTHS, WAS THERE A TIME WHEN YOU DID NOT HAVE A STEADY PLACE TO SLEEP OR SLEPT IN A SHELTER (INCLUDING NOW)?: NO

## 2023-06-22 SDOH — ECONOMIC STABILITY: FOOD INSECURITY: WITHIN THE PAST 12 MONTHS, THE FOOD YOU BOUGHT JUST DIDN'T LAST AND YOU DIDN'T HAVE MONEY TO GET MORE.: NEVER TRUE

## 2023-06-22 SDOH — ECONOMIC STABILITY: FOOD INSECURITY: WITHIN THE PAST 12 MONTHS, YOU WORRIED THAT YOUR FOOD WOULD RUN OUT BEFORE YOU GOT MONEY TO BUY MORE.: NEVER TRUE

## 2023-06-22 SDOH — ECONOMIC STABILITY: INCOME INSECURITY: HOW HARD IS IT FOR YOU TO PAY FOR THE VERY BASICS LIKE FOOD, HOUSING, MEDICAL CARE, AND HEATING?: NOT HARD AT ALL

## 2023-06-22 ASSESSMENT — PATIENT HEALTH QUESTIONNAIRE - PHQ9
SUM OF ALL RESPONSES TO PHQ QUESTIONS 1-9: 0
2. FEELING DOWN, DEPRESSED OR HOPELESS: 0
SUM OF ALL RESPONSES TO PHQ QUESTIONS 1-9: 0
SUM OF ALL RESPONSES TO PHQ9 QUESTIONS 1 & 2: 0
1. LITTLE INTEREST OR PLEASURE IN DOING THINGS: 0

## 2023-06-22 NOTE — PROGRESS NOTES
Becca Gonzalez (:  1945) is a 76 y.o. female,Established patient, here for evaluation of the following chief complaint(s):  Follow-up         ASSESSMENT/PLAN:  1. Pure hypercholesterolemia, unspecified  -     Comprehensive Metabolic Panel; Future  -     CBC with Auto Differential; Future  -     Lipid Panel; Future  2. Primary osteoarthritis of left knee      No follow-ups on file. Subjective   SUBJECTIVE/OBJECTIVE:  HPI In for cholesterol check. Has pain in L knee for one year, not every day. No nighttime pain. Pain is worse in rainy weather. Not taking any meds for knee pain. Biofreeze seems to work as well as anything. Went to [facility] for 5 weeks to stay with son and 3 grandchildren. No complaints of chest pain, shortness of breath, TIAs, claudication or edema. Review of Systems       Objective   Physical Exam  Cardiovascular:      Rate and Rhythm: Normal rate and regular rhythm. Heart sounds: Normal heart sounds. No murmur heard. Pulmonary:      Effort: Pulmonary effort is normal.      Breath sounds: Normal breath sounds. Abdominal:      General: Abdomen is flat. Bowel sounds are normal.      Palpations: Abdomen is soft. Musculoskeletal:      Right lower leg: No edema. Left lower leg: No edema. Comments: L Knee- mild crepitus                  An electronic signature was used to authenticate this note.     --Gustavo Monterroso MD

## 2023-06-22 NOTE — PROGRESS NOTES
Chief Complaint   Patient presents with    Follow-up       1. \"Have you been to the ER, urgent care clinic since your last visit? Hospitalized since your last visit? \" No    2. \"Have you seen or consulted any other health care providers outside of the [address] [place] [address] since your last visit? \" No     3. For patients aged 39-70: Has the patient had a colonoscopy / FIT/ Cologuard? N/A      If the patient is female:    4. For patients aged 41-77: Has the patient had a mammogram within the past 2 years? Yes      5. For patients aged 21-65: Has the patient had a pap smear?  No     Health Maintenance Due   Topic Date Due    Hepatitis C screen  Never done    Shingles vaccine (1 of 2) Never done

## 2023-06-23 LAB
ALBUMIN SERPL-MCNC: 3.8 G/DL (ref 3.5–5)
ALBUMIN/GLOB SERPL: 1 (ref 1.1–2.2)
ALP SERPL-CCNC: 161 U/L (ref 45–117)
ALT SERPL-CCNC: 32 U/L (ref 12–78)
ANION GAP SERPL CALC-SCNC: 2 MMOL/L (ref 5–15)
AST SERPL-CCNC: 25 U/L (ref 15–37)
BASOPHILS # BLD: 0.1 K/UL (ref 0–0.1)
BASOPHILS NFR BLD: 1 % (ref 0–1)
BILIRUB SERPL-MCNC: 0.2 MG/DL (ref 0.2–1)
BUN SERPL-MCNC: 34 MG/DL (ref 6–20)
BUN/CREAT SERPL: 28 (ref 12–20)
CALCIUM SERPL-MCNC: 9.1 MG/DL (ref 8.5–10.1)
CHLORIDE SERPL-SCNC: 108 MMOL/L (ref 97–108)
CHOLEST SERPL-MCNC: 175 MG/DL
CO2 SERPL-SCNC: 29 MMOL/L (ref 21–32)
CREAT SERPL-MCNC: 1.2 MG/DL (ref 0.55–1.02)
DIFFERENTIAL METHOD BLD: ABNORMAL
EOSINOPHIL # BLD: 0.2 K/UL (ref 0–0.4)
EOSINOPHIL NFR BLD: 3 % (ref 0–7)
ERYTHROCYTE [DISTWIDTH] IN BLOOD BY AUTOMATED COUNT: 15.6 % (ref 11.5–14.5)
GLOBULIN SER CALC-MCNC: 3.8 G/DL (ref 2–4)
GLUCOSE SERPL-MCNC: 79 MG/DL (ref 65–100)
HCT VFR BLD AUTO: 31.8 % (ref 35–47)
HDLC SERPL-MCNC: 73 MG/DL
HDLC SERPL: 2.4 (ref 0–5)
HGB BLD-MCNC: 10.1 G/DL (ref 11.5–16)
IMM GRANULOCYTES # BLD AUTO: 0 K/UL (ref 0–0.04)
IMM GRANULOCYTES NFR BLD AUTO: 0 % (ref 0–0.5)
LDLC SERPL CALC-MCNC: 89.6 MG/DL (ref 0–100)
LYMPHOCYTES # BLD: 2.2 K/UL (ref 0.8–3.5)
LYMPHOCYTES NFR BLD: 23 % (ref 12–49)
MCH RBC QN AUTO: 29.6 PG (ref 26–34)
MCHC RBC AUTO-ENTMCNC: 31.8 G/DL (ref 30–36.5)
MCV RBC AUTO: 93.3 FL (ref 80–99)
MONOCYTES # BLD: 0.6 K/UL (ref 0–1)
MONOCYTES NFR BLD: 7 % (ref 5–13)
NEUTS SEG # BLD: 6.6 K/UL (ref 1.8–8)
NEUTS SEG NFR BLD: 66 % (ref 32–75)
NRBC # BLD: 0 K/UL (ref 0–0.01)
NRBC BLD-RTO: 0 PER 100 WBC
PLATELET # BLD AUTO: 220 K/UL (ref 150–400)
PMV BLD AUTO: 12.1 FL (ref 8.9–12.9)
POTASSIUM SERPL-SCNC: 4.4 MMOL/L (ref 3.5–5.1)
PROT SERPL-MCNC: 7.6 G/DL (ref 6.4–8.2)
RBC # BLD AUTO: 3.41 M/UL (ref 3.8–5.2)
SODIUM SERPL-SCNC: 139 MMOL/L (ref 136–145)
TRIGL SERPL-MCNC: 62 MG/DL
VLDLC SERPL CALC-MCNC: 12.4 MG/DL
WBC # BLD AUTO: 9.8 K/UL (ref 3.6–11)

## 2023-11-26 ENCOUNTER — APPOINTMENT (OUTPATIENT)
Facility: HOSPITAL | Age: 78
End: 2023-11-26
Payer: MEDICARE

## 2023-11-26 ENCOUNTER — HOSPITAL ENCOUNTER (EMERGENCY)
Facility: HOSPITAL | Age: 78
Discharge: HOME | End: 2023-11-26
Attending: EMERGENCY MEDICINE
Payer: MEDICARE

## 2023-11-26 VITALS
DIASTOLIC BLOOD PRESSURE: 62 MMHG | HEART RATE: 51 BPM | TEMPERATURE: 98 F | WEIGHT: 133.82 LBS | OXYGEN SATURATION: 99 % | SYSTOLIC BLOOD PRESSURE: 103 MMHG | RESPIRATION RATE: 15 BRPM | HEIGHT: 65 IN | BODY MASS INDEX: 22.3 KG/M2
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
## 2023-11-26 DIAGNOSIS — S09.90XA CLOSED HEAD INJURY, INITIAL ENCOUNTER: ICD-10-CM

## 2023-11-26 DIAGNOSIS — S20.212A CONTUSION OF LEFT CHEST WALL, INITIAL ENCOUNTER: ICD-10-CM

## 2023-11-26 LAB
ALBUMIN SERPL-MCNC: 3.7 G/DL (ref 3.5–5)
ALBUMIN/GLOB SERPL: 0.8 (ref 1.1–2.2)
ALP SERPL-CCNC: 160 U/L (ref 45–117)
ALT SERPL-CCNC: 42 U/L (ref 12–78)
ANION GAP SERPL CALC-SCNC: 2 MMOL/L (ref 5–15)
APPEARANCE UR: CLEAR
AST SERPL-CCNC: 41 U/L (ref 15–37)
BACTERIA URNS QL MICRO: NEGATIVE /HPF
BASOPHILS # BLD: 0 K/UL (ref 0–0.1)
BASOPHILS NFR BLD: 1 % (ref 0–1)
BILIRUB SERPL-MCNC: 0.3 MG/DL (ref 0.2–1)
BILIRUB UR QL: NEGATIVE
BUN SERPL-MCNC: 56 MG/DL (ref 6–20)
BUN/CREAT SERPL: 33 (ref 12–20)
CALCIUM SERPL-MCNC: 9.4 MG/DL (ref 8.5–10.1)
CHLORIDE SERPL-SCNC: 107 MMOL/L (ref 97–108)
CO2 SERPL-SCNC: 29 MMOL/L (ref 21–32)
COLOR UR: ABNORMAL
COMMENT:: NORMAL
CREAT SERPL-MCNC: 1.72 MG/DL (ref 0.55–1.02)
DIFFERENTIAL METHOD BLD: ABNORMAL
EOSINOPHIL # BLD: 0.1 K/UL (ref 0–0.4)
EOSINOPHIL NFR BLD: 2 % (ref 0–7)
EPITH CASTS URNS QL MICRO: ABNORMAL /LPF
ERYTHROCYTE [DISTWIDTH] IN BLOOD BY AUTOMATED COUNT: 16.1 % (ref 11.5–14.5)
GLOBULIN SER CALC-MCNC: 4.5 G/DL (ref 2–4)
GLUCOSE SERPL-MCNC: 120 MG/DL (ref 65–100)
GLUCOSE UR STRIP.AUTO-MCNC: NEGATIVE MG/DL
HCT VFR BLD AUTO: 32.6 % (ref 35–47)
HGB BLD-MCNC: 10.5 G/DL (ref 11.5–16)
HGB UR QL STRIP: NEGATIVE
IMM GRANULOCYTES # BLD AUTO: 0.1 K/UL (ref 0–0.04)
IMM GRANULOCYTES NFR BLD AUTO: 1 % (ref 0–0.5)
KETONES UR QL STRIP.AUTO: NEGATIVE MG/DL
LACTATE BLD-SCNC: 0.95 MMOL/L (ref 0.4–2)
LEUKOCYTE ESTERASE UR QL STRIP.AUTO: ABNORMAL
LYMPHOCYTES # BLD: 2 K/UL (ref 0.8–3.5)
LYMPHOCYTES NFR BLD: 26 % (ref 12–49)
MAGNESIUM SERPL-MCNC: 2.3 MG/DL (ref 1.6–2.4)
MCH RBC QN AUTO: 30.3 PG (ref 26–34)
MCHC RBC AUTO-ENTMCNC: 32.2 G/DL (ref 30–36.5)
MCV RBC AUTO: 93.9 FL (ref 80–99)
MONOCYTES # BLD: 0.4 K/UL (ref 0–1)
MONOCYTES NFR BLD: 5 % (ref 5–13)
NEUTS SEG # BLD: 5.1 K/UL (ref 1.8–8)
NEUTS SEG NFR BLD: 65 % (ref 32–75)
NITRITE UR QL STRIP.AUTO: NEGATIVE
NRBC # BLD: 0 K/UL (ref 0–0.01)
NRBC BLD-RTO: 0 PER 100 WBC
PH UR STRIP: 6 (ref 5–8)
PLATELET # BLD AUTO: 189 K/UL (ref 150–400)
PMV BLD AUTO: 11.3 FL (ref 8.9–12.9)
POTASSIUM SERPL-SCNC: 4.7 MMOL/L (ref 3.5–5.1)
PROT SERPL-MCNC: 8.2 G/DL (ref 6.4–8.2)
PROT UR STRIP-MCNC: NEGATIVE MG/DL
RBC # BLD AUTO: 3.47 M/UL (ref 3.8–5.2)
RBC #/AREA URNS HPF: ABNORMAL /HPF (ref 0–5)
SODIUM SERPL-SCNC: 138 MMOL/L (ref 136–145)
SP GR UR REFRACTOMETRY: 1.01
SPECIMEN HOLD: NORMAL
TROPONIN I SERPL HS-MCNC: 21 NG/L (ref 0–51)
TROPONIN I SERPL HS-MCNC: 27 NG/L (ref 0–51)
URINE CULTURE IF INDICATED: ABNORMAL
UROBILINOGEN UR QL STRIP.AUTO: 0.2 EU/DL (ref 0.2–1)
WBC # BLD AUTO: 7.7 K/UL (ref 3.6–11)
WBC URNS QL MICRO: ABNORMAL /HPF (ref 0–4)

## 2023-11-26 PROCEDURE — 70450 CT HEAD/BRAIN W/O DYE: CPT

## 2023-11-26 PROCEDURE — 85025 COMPLETE CBC W/AUTO DIFF WBC: CPT

## 2023-11-26 PROCEDURE — 81001 URINALYSIS AUTO W/SCOPE: CPT

## 2023-11-26 PROCEDURE — 87040 BLOOD CULTURE FOR BACTERIA: CPT

## 2023-11-26 PROCEDURE — 83605 ASSAY OF LACTIC ACID: CPT

## 2023-11-26 PROCEDURE — 36415 COLL VENOUS BLD VENIPUNCTURE: CPT

## 2023-11-26 PROCEDURE — 2580000003 HC RX 258: Performed by: EMERGENCY MEDICINE

## 2023-11-26 PROCEDURE — 96374 THER/PROPH/DIAG INJ IV PUSH: CPT

## 2023-11-26 PROCEDURE — 6360000002 HC RX W HCPCS: Performed by: EMERGENCY MEDICINE

## 2023-11-26 PROCEDURE — 80053 COMPREHEN METABOLIC PANEL: CPT

## 2023-11-26 PROCEDURE — 6370000000 HC RX 637 (ALT 250 FOR IP): Performed by: EMERGENCY MEDICINE

## 2023-11-26 PROCEDURE — 71045 X-RAY EXAM CHEST 1 VIEW: CPT

## 2023-11-26 PROCEDURE — 84484 ASSAY OF TROPONIN QUANT: CPT

## 2023-11-26 PROCEDURE — 96361 HYDRATE IV INFUSION ADD-ON: CPT

## 2023-11-26 PROCEDURE — 93005 ELECTROCARDIOGRAM TRACING: CPT | Performed by: EMERGENCY MEDICINE

## 2023-11-26 PROCEDURE — 99285 EMERGENCY DEPT VISIT HI MDM: CPT

## 2023-11-26 PROCEDURE — 83735 ASSAY OF MAGNESIUM: CPT

## 2023-11-26 RX ORDER — SODIUM CHLORIDE, SODIUM LACTATE, POTASSIUM CHLORIDE, AND CALCIUM CHLORIDE .6; .31; .03; .02 G/100ML; G/100ML; G/100ML; G/100ML
1000 INJECTION, SOLUTION INTRAVENOUS
Status: COMPLETED | OUTPATIENT
Start: 2023-11-26 | End: 2023-11-26

## 2023-11-26 RX ORDER — KETOROLAC TROMETHAMINE 30 MG/ML
15 INJECTION, SOLUTION INTRAMUSCULAR; INTRAVENOUS
Status: COMPLETED | OUTPATIENT
Start: 2023-11-26 | End: 2023-11-26

## 2023-11-26 RX ORDER — LIDOCAINE 4 G/G
1 PATCH TOPICAL
Status: DISCONTINUED | OUTPATIENT
Start: 2023-11-26 | End: 2023-11-26 | Stop reason: HOSPADM

## 2023-11-26 RX ADMIN — SODIUM CHLORIDE, POTASSIUM CHLORIDE, SODIUM LACTATE AND CALCIUM CHLORIDE 1000 ML: 600; 310; 30; 20 INJECTION, SOLUTION INTRAVENOUS at 12:56

## 2023-11-26 RX ADMIN — KETOROLAC TROMETHAMINE 15 MG: 30 INJECTION, SOLUTION INTRAMUSCULAR; INTRAVENOUS at 14:46

## 2023-11-26 ASSESSMENT — PAIN SCALES - GENERAL: PAINLEVEL_OUTOF10: 6
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
## 2023-11-26 NOTE — DISCHARGE INSTRUCTIONS
You were seen in the ER for your symptoms. Eddie use the SalonPas or IcyHot patches with lidocaine for pain. You can also take tylenol and diclofenac as needed for pain. Return for new or worsening symptoms at any time. As we discussed the xray did not show any broken ribs, but sometimes this can miss small fractures. Return for worsening pain at any time.
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
## 2023-11-27 LAB
EKG ATRIAL RATE: 53 BPM
EKG DIAGNOSIS: NORMAL
EKG P AXIS: 75 DEGREES
EKG P-R INTERVAL: 228 MS
EKG Q-T INTERVAL: 464 MS
EKG QRS DURATION: 108 MS
EKG QTC CALCULATION (BAZETT): 435 MS
EKG R AXIS: 86 DEGREES
EKG T AXIS: 85 DEGREES
EKG VENTRICULAR RATE: 53 BPM

## 2023-11-30 ENCOUNTER — OFFICE VISIT (OUTPATIENT)
Age: 78
End: 2023-11-30
Payer: MEDICARE

## 2023-11-30 DIAGNOSIS — S20.211D CONTUSION OF RIB ON RIGHT SIDE, SUBSEQUENT ENCOUNTER: Primary | ICD-10-CM

## 2023-11-30 PROCEDURE — G8399 PT W/DXA RESULTS DOCUMENT: HCPCS | Performed by: FAMILY MEDICINE

## 2023-11-30 PROCEDURE — 99213 OFFICE O/P EST LOW 20 MIN: CPT | Performed by: FAMILY MEDICINE

## 2023-11-30 PROCEDURE — G8484 FLU IMMUNIZE NO ADMIN: HCPCS | Performed by: FAMILY MEDICINE

## 2023-11-30 PROCEDURE — 1123F ACP DISCUSS/DSCN MKR DOCD: CPT | Performed by: FAMILY MEDICINE

## 2023-11-30 PROCEDURE — G8420 CALC BMI NORM PARAMETERS: HCPCS | Performed by: FAMILY MEDICINE

## 2023-11-30 PROCEDURE — 1036F TOBACCO NON-USER: CPT | Performed by: FAMILY MEDICINE

## 2023-11-30 PROCEDURE — 1090F PRES/ABSN URINE INCON ASSESS: CPT | Performed by: FAMILY MEDICINE

## 2023-11-30 PROCEDURE — G8427 DOCREV CUR MEDS BY ELIG CLIN: HCPCS | Performed by: FAMILY MEDICINE

## 2023-11-30 RX ORDER — ACETAMINOPHEN 325 MG/1
650 TABLET ORAL EVERY 6 HOURS PRN
Qty: 40 TABLET | Refills: 3 | Status: SHIPPED | OUTPATIENT
Start: 2023-11-30
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
## 2023-11-30 NOTE — PROGRESS NOTES
Chief Complaint   Patient presents with    Follow-up     ER - 11/26/23         1. \"Have you been to the ER, urgent care clinic since your last visit? Hospitalized since your last visit? \" 11/26/23-[facility]     2. \"Have you seen or consulted any other health care providers outside of the [address] [place] [address] since your last visit? \" no     3. For patients aged 43-73: Has the patient had a colonoscopy / FIT/ Cologuard? N/a      If the patient is female:    4. For patients aged 43-66: Has the patient had a mammogram within the past 2 years? N/a      5. For patients aged 21-65: Has the patient had a pap smear?  N/a      Health Maintenance Due   Topic Date Due    Shingles vaccine (1 of 2) Never done    Respiratory Syncytial Virus (RSV) age 61 yrs+ (3 - 1-dose 60+ series) Never done    Flu vaccine (1) 08/01/2023    COVID-19 Vaccine (6 - 2023-24 season) 09/01/2023    Annual Wellness Visit (AWV)  12/23/2023

## 2023-12-02 LAB
BACTERIA SPEC CULT: NORMAL
SERVICE CMNT-IMP: NORMAL

## 2023-12-28 ENCOUNTER — TELEPHONE (OUTPATIENT)
Age: 78
End: 2023-12-28

## 2024-03-22 ENCOUNTER — TRANSCRIBE ORDERS (OUTPATIENT)
Facility: HOSPITAL | Age: 79
End: 2024-03-22

## 2024-03-22 DIAGNOSIS — Z12.31 SCREENING MAMMOGRAM FOR HIGH-RISK PATIENT: Primary | ICD-10-CM

## 2024-04-17 ENCOUNTER — HOSPITAL ENCOUNTER (OUTPATIENT)
Facility: HOSPITAL | Age: 79
Discharge: HOME | End: 2024-04-20
Attending: FAMILY MEDICINE
Payer: MEDICARE

## 2024-04-17 VITALS — WEIGHT: 128 LBS | BODY MASS INDEX: 21.33 KG/M2 | HEIGHT: 65 IN

## 2024-04-17 DIAGNOSIS — Z12.31 SCREENING MAMMOGRAM FOR HIGH-RISK PATIENT: ICD-10-CM

## 2024-04-17 PROCEDURE — 77063 BREAST TOMOSYNTHESIS BI: CPT

## 2024-06-24 ENCOUNTER — OFFICE VISIT (OUTPATIENT)
Age: 79
End: 2024-06-24
Payer: MEDICARE

## 2024-06-24 DIAGNOSIS — E78.00 PURE HYPERCHOLESTEROLEMIA, UNSPECIFIED: ICD-10-CM

## 2024-06-24 DIAGNOSIS — Z00.00 MEDICARE ANNUAL WELLNESS VISIT, SUBSEQUENT: Primary | ICD-10-CM

## 2024-06-24 DIAGNOSIS — E87.5 HYPERKALEMIA: ICD-10-CM

## 2024-06-24 LAB
ALBUMIN SERPL-MCNC: 3.7 G/DL (ref 3.5–5)
ALBUMIN/GLOB SERPL: 1.1 (ref 1.1–2.2)
ALP SERPL-CCNC: 166 U/L (ref 45–117)
ALT SERPL-CCNC: 40 U/L (ref 12–78)
ANION GAP SERPL CALC-SCNC: 7 MMOL/L (ref 5–15)
AST SERPL-CCNC: 30 U/L (ref 15–37)
BASOPHILS # BLD: 0.1 K/UL (ref 0–0.1)
BASOPHILS NFR BLD: 1 % (ref 0–1)
BILIRUB SERPL-MCNC: 0.3 MG/DL (ref 0.2–1)
BUN SERPL-MCNC: 48 MG/DL (ref 6–20)
BUN/CREAT SERPL: 32 (ref 12–20)
CALCIUM SERPL-MCNC: 8.9 MG/DL (ref 8.5–10.1)
CHLORIDE SERPL-SCNC: 104 MMOL/L (ref 97–108)
CHOLEST SERPL-MCNC: 162 MG/DL
CO2 SERPL-SCNC: 24 MMOL/L (ref 21–32)
CREAT SERPL-MCNC: 1.5 MG/DL (ref 0.55–1.02)
DIFFERENTIAL METHOD BLD: ABNORMAL
EOSINOPHIL # BLD: 0.3 K/UL (ref 0–0.4)
EOSINOPHIL NFR BLD: 4 % (ref 0–7)
ERYTHROCYTE [DISTWIDTH] IN BLOOD BY AUTOMATED COUNT: 15.9 % (ref 11.5–14.5)
GLOBULIN SER CALC-MCNC: 3.3 G/DL (ref 2–4)
GLUCOSE SERPL-MCNC: 69 MG/DL (ref 65–100)
HCT VFR BLD AUTO: 27.3 % (ref 35–47)
HDLC SERPL-MCNC: 66 MG/DL
HDLC SERPL: 2.5 (ref 0–5)
HGB BLD-MCNC: 8.9 G/DL (ref 11.5–16)
IMM GRANULOCYTES # BLD AUTO: 0 K/UL (ref 0–0.04)
IMM GRANULOCYTES NFR BLD AUTO: 0 % (ref 0–0.5)
LDLC SERPL CALC-MCNC: 82.4 MG/DL (ref 0–100)
LYMPHOCYTES # BLD: 1.6 K/UL (ref 0.8–3.5)
LYMPHOCYTES NFR BLD: 23 % (ref 12–49)
MCH RBC QN AUTO: 30.1 PG (ref 26–34)
MCHC RBC AUTO-ENTMCNC: 32.6 G/DL (ref 30–36.5)
MCV RBC AUTO: 92.2 FL (ref 80–99)
MONOCYTES # BLD: 0.4 K/UL (ref 0–1)
MONOCYTES NFR BLD: 5 % (ref 5–13)
NEUTS SEG # BLD: 4.7 K/UL (ref 1.8–8)
NEUTS SEG NFR BLD: 67 % (ref 32–75)
NRBC # BLD: 0 K/UL (ref 0–0.01)
NRBC BLD-RTO: 0 PER 100 WBC
PLATELET # BLD AUTO: 252 K/UL (ref 150–400)
PMV BLD AUTO: 12 FL (ref 8.9–12.9)
POTASSIUM SERPL-SCNC: 5.7 MMOL/L (ref 3.5–5.1)
PROT SERPL-MCNC: 7 G/DL (ref 6.4–8.2)
RBC # BLD AUTO: 2.96 M/UL (ref 3.8–5.2)
SODIUM SERPL-SCNC: 135 MMOL/L (ref 136–145)
TRIGL SERPL-MCNC: 68 MG/DL
VLDLC SERPL CALC-MCNC: 13.6 MG/DL
WBC # BLD AUTO: 7.1 K/UL (ref 3.6–11)

## 2024-06-24 PROCEDURE — 99213 OFFICE O/P EST LOW 20 MIN: CPT | Performed by: FAMILY MEDICINE

## 2024-06-24 PROCEDURE — G8420 CALC BMI NORM PARAMETERS: HCPCS | Performed by: FAMILY MEDICINE

## 2024-06-24 PROCEDURE — G8399 PT W/DXA RESULTS DOCUMENT: HCPCS | Performed by: FAMILY MEDICINE

## 2024-06-24 PROCEDURE — 1123F ACP DISCUSS/DSCN MKR DOCD: CPT | Performed by: FAMILY MEDICINE

## 2024-06-24 PROCEDURE — 1090F PRES/ABSN URINE INCON ASSESS: CPT | Performed by: FAMILY MEDICINE

## 2024-06-24 PROCEDURE — G0439 PPPS, SUBSEQ VISIT: HCPCS | Performed by: FAMILY MEDICINE

## 2024-06-24 PROCEDURE — 1036F TOBACCO NON-USER: CPT | Performed by: FAMILY MEDICINE

## 2024-06-24 PROCEDURE — G8427 DOCREV CUR MEDS BY ELIG CLIN: HCPCS | Performed by: FAMILY MEDICINE

## 2024-06-24 RX ORDER — ATORVASTATIN CALCIUM 40 MG/1
40 TABLET, FILM COATED ORAL DAILY
Qty: 90 TABLET | Refills: 3 | Status: SHIPPED | OUTPATIENT
Start: 2024-06-24

## 2024-06-24 RX ORDER — FUROSEMIDE 20 MG/1
TABLET ORAL
Qty: 90 TABLET | Refills: 3 | Status: SHIPPED | OUTPATIENT
Start: 2024-06-24

## 2024-06-24 RX ORDER — TRIAMCINOLONE ACETONIDE 5 MG/G
CREAM TOPICAL
Qty: 45 G | Refills: 2 | Status: SHIPPED | OUTPATIENT
Start: 2024-06-24 | End: 2024-07-01

## 2024-06-24 SDOH — ECONOMIC STABILITY: FOOD INSECURITY: WITHIN THE PAST 12 MONTHS, THE FOOD YOU BOUGHT JUST DIDN'T LAST AND YOU DIDN'T HAVE MONEY TO GET MORE.: NEVER TRUE

## 2024-06-24 SDOH — ECONOMIC STABILITY: FOOD INSECURITY: WITHIN THE PAST 12 MONTHS, YOU WORRIED THAT YOUR FOOD WOULD RUN OUT BEFORE YOU GOT MONEY TO BUY MORE.: NEVER TRUE

## 2024-06-24 SDOH — ECONOMIC STABILITY: INCOME INSECURITY: HOW HARD IS IT FOR YOU TO PAY FOR THE VERY BASICS LIKE FOOD, HOUSING, MEDICAL CARE, AND HEATING?: NOT HARD AT ALL

## 2024-06-24 ASSESSMENT — PATIENT HEALTH QUESTIONNAIRE - PHQ9
SUM OF ALL RESPONSES TO PHQ9 QUESTIONS 1 & 2: 0
SUM OF ALL RESPONSES TO PHQ QUESTIONS 1-9: 0
2. FEELING DOWN, DEPRESSED OR HOPELESS: NOT AT ALL
1. LITTLE INTEREST OR PLEASURE IN DOING THINGS: NOT AT ALL
SUM OF ALL RESPONSES TO PHQ QUESTIONS 1-9: 0

## 2024-06-24 ASSESSMENT — LIFESTYLE VARIABLES: HOW MANY STANDARD DRINKS CONTAINING ALCOHOL DO YOU HAVE ON A TYPICAL DAY: PATIENT DOES NOT DRINK

## 2024-06-24 NOTE — PROGRESS NOTES
Zeenat Rolle (:  1945) is a 78 y.o. female,Established patient, here for evaluation of the following chief complaint(s):  Medicare AWV      Assessment & Plan   1. Medicare annual wellness visit, subsequent  2. Pure hypercholesterolemia, unspecified  -     CBC with Auto Differential; Future  -     Lipid Panel; Future  3. Hyperkalemia  -     Comprehensive Metabolic Panel; Future      Return in about 6 months (around 2024).       Subjective   HPI In for medicare wellness exam. Not seeing any other doctors. Had a mammogram last month.   Needs Shingles shot, otherwise UTD on vaccinations. Would want daughter Jonathon to be her mPOA if she became incapacitated. Also needs cholesterol checked. No complaints of chest pain, shortness of breath, TIAs, claudication or edema.      Review of Systems   HENT:  Negative for hearing loss.    Neurological:         No falls, canes. Independent in all adls.    Psychiatric/Behavioral:          Not depressed, no alcohol.           Objective   Physical Exam  Cardiovascular:      Rate and Rhythm: Normal rate and regular rhythm.      Heart sounds: Normal heart sounds. No murmur heard.  Pulmonary:      Effort: Pulmonary effort is normal.      Breath sounds: Normal breath sounds.   Abdominal:      General: Abdomen is flat. Bowel sounds are normal.      Palpations: Abdomen is soft.   Musculoskeletal:      Right lower leg: No edema.      Left lower leg: No edema.                An electronic signature was used to authenticate this note.    --Lefyt Segura MD

## 2024-06-24 NOTE — PROGRESS NOTES
Chief Complaint   Patient presents with    Medicare AWV         1. \"Have you been to the ER, urgent care clinic since your last visit?  Hospitalized since your last visit?\" no    2. \"Have you seen or consulted any other health care providers outside of the [facility] since your last visit?\" no     3. For patients aged 45-75: Has the patient had a colonoscopy / FIT/ Cologuard? N/A      If the patient is female:    4. For patients aged 40-74: Has the patient had a mammogram within the past 2 years? N/A      5. For patients aged 21-65: Has the patient had a pap smear? N/A      Health Maintenance Due   Topic Date Due    Shingles vaccine (1 of 2) Never done    Respiratory Syncytial Virus (RSV) Pregnant or age 60 yrs+ (1 - 1-dose 60+ series) Never done    COVID-19 Vaccine (7 - 2023-24 season) 11/24/2023    Annual Wellness Visit (Medicare Advantage)  01/01/2024    DTaP/Tdap/Td vaccine (2 - Td or Tdap) 01/28/2024

## 2024-07-01 DIAGNOSIS — E87.5 HYPERKALEMIA: Primary | ICD-10-CM

## 2024-07-01 RX ORDER — FUROSEMIDE 20 MG/1
TABLET ORAL
Qty: 180 TABLET | Refills: 3 | Status: ON HOLD | OUTPATIENT
Start: 2024-07-01 | End: 2024-07-09 | Stop reason: HOSPADM

## 2024-07-01 NOTE — PROGRESS NOTES
Pc with pt. Will increase lasix 20 mg to 2 tabs daily. Also sounds like pt eating a high potassium diet with lots of citrus. To avoid this. Will also refer nephrology for persistent mild hyperkalemia.

## 2024-07-07 ENCOUNTER — HOSPITAL ENCOUNTER (INPATIENT)
Facility: HOSPITAL | Age: 79
LOS: 2 days | Discharge: HOME HEALTH | DRG: 641 | End: 2024-07-09
Attending: STUDENT IN AN ORGANIZED HEALTH CARE EDUCATION/TRAINING PROGRAM | Admitting: INTERNAL MEDICINE
Payer: MEDICARE

## 2024-07-07 ENCOUNTER — APPOINTMENT (OUTPATIENT)
Facility: HOSPITAL | Age: 79
DRG: 641 | End: 2024-07-07
Payer: MEDICARE

## 2024-07-07 DIAGNOSIS — R55 SYNCOPE AND COLLAPSE: ICD-10-CM

## 2024-07-07 DIAGNOSIS — A41.9 SEPTICEMIA (HCC): Primary | ICD-10-CM

## 2024-07-07 DIAGNOSIS — E86.1 HYPOTENSION DUE TO HYPOVOLEMIA: ICD-10-CM

## 2024-07-07 DIAGNOSIS — T68.XXXA HYPOTHERMIA, INITIAL ENCOUNTER: ICD-10-CM

## 2024-07-07 DIAGNOSIS — R07.9 CHEST PAIN, UNSPECIFIED TYPE: ICD-10-CM

## 2024-07-07 LAB
ALBUMIN SERPL-MCNC: 3.7 G/DL (ref 3.5–5)
ALBUMIN/GLOB SERPL: 0.8 (ref 1.1–2.2)
ALP SERPL-CCNC: 176 U/L (ref 45–117)
ALT SERPL-CCNC: 35 U/L (ref 12–78)
ANION GAP SERPL CALC-SCNC: 8 MMOL/L (ref 5–15)
APPEARANCE UR: CLEAR
AST SERPL-CCNC: 32 U/L (ref 15–37)
BACTERIA URNS QL MICRO: NEGATIVE /HPF
BASOPHILS # BLD: 0.1 K/UL (ref 0–0.1)
BASOPHILS NFR BLD: 0 % (ref 0–1)
BILIRUB SERPL-MCNC: 0.3 MG/DL (ref 0.2–1)
BILIRUB UR QL: NEGATIVE
BUN SERPL-MCNC: 36 MG/DL (ref 6–20)
BUN/CREAT SERPL: 22 (ref 12–20)
CALCIUM SERPL-MCNC: 9.5 MG/DL (ref 8.5–10.1)
CHLORIDE SERPL-SCNC: 100 MMOL/L (ref 97–108)
CO2 SERPL-SCNC: 24 MMOL/L (ref 21–32)
COLOR UR: ABNORMAL
COMMENT:: NORMAL
CREAT SERPL-MCNC: 1.63 MG/DL (ref 0.55–1.02)
DIFFERENTIAL METHOD BLD: ABNORMAL
EOSINOPHIL # BLD: 0.2 K/UL (ref 0–0.4)
EOSINOPHIL NFR BLD: 1 % (ref 0–7)
EPITH CASTS URNS QL MICRO: ABNORMAL /LPF
ERYTHROCYTE [DISTWIDTH] IN BLOOD BY AUTOMATED COUNT: 15.8 % (ref 11.5–14.5)
GLOBULIN SER CALC-MCNC: 4.4 G/DL (ref 2–4)
GLUCOSE SERPL-MCNC: 110 MG/DL (ref 65–100)
GLUCOSE UR STRIP.AUTO-MCNC: NEGATIVE MG/DL
HCT VFR BLD AUTO: 29.3 % (ref 35–47)
HGB BLD-MCNC: 9.6 G/DL (ref 11.5–16)
HGB UR QL STRIP: NEGATIVE
HYALINE CASTS URNS QL MICRO: ABNORMAL /LPF (ref 0–2)
IMM GRANULOCYTES # BLD AUTO: 0.1 K/UL (ref 0–0.04)
IMM GRANULOCYTES NFR BLD AUTO: 0 % (ref 0–0.5)
KETONES UR QL STRIP.AUTO: ABNORMAL MG/DL
LACTATE BLD-SCNC: 1.11 MMOL/L (ref 0.4–2)
LEUKOCYTE ESTERASE UR QL STRIP.AUTO: NEGATIVE
LYMPHOCYTES # BLD: 2.1 K/UL (ref 0.8–3.5)
LYMPHOCYTES NFR BLD: 13 % (ref 12–49)
MCH RBC QN AUTO: 29.4 PG (ref 26–34)
MCHC RBC AUTO-ENTMCNC: 32.8 G/DL (ref 30–36.5)
MCV RBC AUTO: 89.9 FL (ref 80–99)
MONOCYTES # BLD: 0.7 K/UL (ref 0–1)
MONOCYTES NFR BLD: 4 % (ref 5–13)
NEUTS SEG # BLD: 13.4 K/UL (ref 1.8–8)
NEUTS SEG NFR BLD: 82 % (ref 32–75)
NITRITE UR QL STRIP.AUTO: NEGATIVE
NRBC # BLD: 0 K/UL (ref 0–0.01)
NRBC BLD-RTO: 0 PER 100 WBC
PH UR STRIP: 5 (ref 5–8)
PLATELET # BLD AUTO: 250 K/UL (ref 150–400)
PMV BLD AUTO: 11 FL (ref 8.9–12.9)
POTASSIUM SERPL-SCNC: 4.5 MMOL/L (ref 3.5–5.1)
PROCALCITONIN SERPL-MCNC: 0.05 NG/ML
PROT SERPL-MCNC: 8.1 G/DL (ref 6.4–8.2)
PROT UR STRIP-MCNC: NEGATIVE MG/DL
RBC # BLD AUTO: 3.26 M/UL (ref 3.8–5.2)
RBC #/AREA URNS HPF: ABNORMAL /HPF (ref 0–5)
SODIUM SERPL-SCNC: 132 MMOL/L (ref 136–145)
SP GR UR REFRACTOMETRY: 1.01
SPECIMEN HOLD: NORMAL
TROPONIN I SERPL HS-MCNC: 13 NG/L (ref 0–51)
URINE CULTURE IF INDICATED: ABNORMAL
UROBILINOGEN UR QL STRIP.AUTO: 0.2 EU/DL (ref 0.2–1)
WBC # BLD AUTO: 16.4 K/UL (ref 3.6–11)
WBC URNS QL MICRO: ABNORMAL /HPF (ref 0–4)

## 2024-07-07 PROCEDURE — 2580000003 HC RX 258: Performed by: INTERNAL MEDICINE

## 2024-07-07 PROCEDURE — 85025 COMPLETE CBC W/AUTO DIFF WBC: CPT

## 2024-07-07 PROCEDURE — 2580000003 HC RX 258: Performed by: STUDENT IN AN ORGANIZED HEALTH CARE EDUCATION/TRAINING PROGRAM

## 2024-07-07 PROCEDURE — 99285 EMERGENCY DEPT VISIT HI MDM: CPT

## 2024-07-07 PROCEDURE — 80053 COMPREHEN METABOLIC PANEL: CPT

## 2024-07-07 PROCEDURE — 93005 ELECTROCARDIOGRAM TRACING: CPT | Performed by: STUDENT IN AN ORGANIZED HEALTH CARE EDUCATION/TRAINING PROGRAM

## 2024-07-07 PROCEDURE — 6370000000 HC RX 637 (ALT 250 FOR IP): Performed by: INTERNAL MEDICINE

## 2024-07-07 PROCEDURE — 36415 COLL VENOUS BLD VENIPUNCTURE: CPT

## 2024-07-07 PROCEDURE — 71045 X-RAY EXAM CHEST 1 VIEW: CPT

## 2024-07-07 PROCEDURE — 70450 CT HEAD/BRAIN W/O DYE: CPT

## 2024-07-07 PROCEDURE — 84484 ASSAY OF TROPONIN QUANT: CPT

## 2024-07-07 PROCEDURE — 83605 ASSAY OF LACTIC ACID: CPT

## 2024-07-07 PROCEDURE — 6360000002 HC RX W HCPCS: Performed by: STUDENT IN AN ORGANIZED HEALTH CARE EDUCATION/TRAINING PROGRAM

## 2024-07-07 PROCEDURE — 87040 BLOOD CULTURE FOR BACTERIA: CPT

## 2024-07-07 PROCEDURE — 81001 URINALYSIS AUTO W/SCOPE: CPT

## 2024-07-07 PROCEDURE — 96374 THER/PROPH/DIAG INJ IV PUSH: CPT

## 2024-07-07 PROCEDURE — 84145 PROCALCITONIN (PCT): CPT

## 2024-07-07 PROCEDURE — 2060000000 HC ICU INTERMEDIATE R&B

## 2024-07-07 RX ORDER — SODIUM CHLORIDE 9 MG/ML
INJECTION, SOLUTION INTRAVENOUS CONTINUOUS
Status: DISCONTINUED | OUTPATIENT
Start: 2024-07-07 | End: 2024-07-09 | Stop reason: HOSPADM

## 2024-07-07 RX ORDER — POLYETHYLENE GLYCOL 3350 17 G/17G
17 POWDER, FOR SOLUTION ORAL DAILY PRN
Status: DISCONTINUED | OUTPATIENT
Start: 2024-07-07 | End: 2024-07-09 | Stop reason: HOSPADM

## 2024-07-07 RX ORDER — ACETAMINOPHEN 650 MG/1
650 SUPPOSITORY RECTAL EVERY 6 HOURS PRN
Status: DISCONTINUED | OUTPATIENT
Start: 2024-07-07 | End: 2024-07-09 | Stop reason: HOSPADM

## 2024-07-07 RX ORDER — ONDANSETRON 2 MG/ML
4 INJECTION INTRAMUSCULAR; INTRAVENOUS EVERY 6 HOURS PRN
Status: DISCONTINUED | OUTPATIENT
Start: 2024-07-07 | End: 2024-07-09 | Stop reason: HOSPADM

## 2024-07-07 RX ORDER — SODIUM CHLORIDE 0.9 % (FLUSH) 0.9 %
5-40 SYRINGE (ML) INJECTION PRN
Status: DISCONTINUED | OUTPATIENT
Start: 2024-07-07 | End: 2024-07-09 | Stop reason: HOSPADM

## 2024-07-07 RX ORDER — SODIUM CHLORIDE 0.9 % (FLUSH) 0.9 %
5-40 SYRINGE (ML) INJECTION EVERY 12 HOURS SCHEDULED
Status: DISCONTINUED | OUTPATIENT
Start: 2024-07-07 | End: 2024-07-09 | Stop reason: HOSPADM

## 2024-07-07 RX ORDER — ONDANSETRON 4 MG/1
4 TABLET, ORALLY DISINTEGRATING ORAL EVERY 8 HOURS PRN
Status: DISCONTINUED | OUTPATIENT
Start: 2024-07-07 | End: 2024-07-09 | Stop reason: HOSPADM

## 2024-07-07 RX ORDER — ENOXAPARIN SODIUM 100 MG/ML
30 INJECTION SUBCUTANEOUS DAILY
Status: DISCONTINUED | OUTPATIENT
Start: 2024-07-08 | End: 2024-07-09 | Stop reason: HOSPADM

## 2024-07-07 RX ORDER — SODIUM CHLORIDE 9 MG/ML
INJECTION, SOLUTION INTRAVENOUS PRN
Status: DISCONTINUED | OUTPATIENT
Start: 2024-07-07 | End: 2024-07-09 | Stop reason: HOSPADM

## 2024-07-07 RX ORDER — CEFTRIAXONE 1 G/1
1000 INJECTION, POWDER, FOR SOLUTION INTRAMUSCULAR; INTRAVENOUS ONCE
Status: DISCONTINUED | OUTPATIENT
Start: 2024-07-07 | End: 2024-07-07

## 2024-07-07 RX ORDER — 0.9 % SODIUM CHLORIDE 0.9 %
1000 INTRAVENOUS SOLUTION INTRAVENOUS ONCE
Status: COMPLETED | OUTPATIENT
Start: 2024-07-07 | End: 2024-07-07

## 2024-07-07 RX ORDER — ACETAMINOPHEN 325 MG/1
650 TABLET ORAL EVERY 6 HOURS PRN
Status: DISCONTINUED | OUTPATIENT
Start: 2024-07-07 | End: 2024-07-09 | Stop reason: HOSPADM

## 2024-07-07 RX ORDER — ATORVASTATIN CALCIUM 40 MG/1
40 TABLET, FILM COATED ORAL NIGHTLY
Status: DISCONTINUED | OUTPATIENT
Start: 2024-07-07 | End: 2024-07-09 | Stop reason: HOSPADM

## 2024-07-07 RX ORDER — ASPIRIN 81 MG/1
81 TABLET ORAL DAILY
Status: DISCONTINUED | OUTPATIENT
Start: 2024-07-07 | End: 2024-07-09 | Stop reason: HOSPADM

## 2024-07-07 RX ADMIN — ATORVASTATIN CALCIUM 40 MG: 40 TABLET, FILM COATED ORAL at 21:43

## 2024-07-07 RX ADMIN — SODIUM CHLORIDE, PRESERVATIVE FREE 10 ML: 5 INJECTION INTRAVENOUS at 21:44

## 2024-07-07 RX ADMIN — SODIUM CHLORIDE 1000 ML: 9 INJECTION, SOLUTION INTRAVENOUS at 12:38

## 2024-07-07 RX ADMIN — SODIUM CHLORIDE: 9 INJECTION, SOLUTION INTRAVENOUS at 18:29

## 2024-07-07 RX ADMIN — SODIUM CHLORIDE: 9 INJECTION, SOLUTION INTRAVENOUS at 15:24

## 2024-07-07 RX ADMIN — SODIUM CHLORIDE 1000 MG: 900 INJECTION INTRAVENOUS at 13:17

## 2024-07-07 RX ADMIN — ASPIRIN 81 MG: 81 TABLET, COATED ORAL at 16:19

## 2024-07-07 NOTE — ED TRIAGE NOTES
Pt alert to self and place, no visible injuries,in no distress. Comes from home for seizure like activity

## 2024-07-07 NOTE — PROGRESS NOTES
1800: Patient on unit and settled in bed. Dual skin assessment completed with CRISTÓBAL Kate. Admission assessment completed. Call bell and personal belongings within reach. Bed alarm on. Patient educated to room and unit.    1900: Bedside and Verbal shift change report given to CRISTÓBAL Turner (oncoming nurse) by Vanita Brown RN (offgoing nurse). Report included the following information Nurse Handoff Report, Index, Adult Overview, Intake/Output, MAR, Recent Results, and Cardiac Rhythm NSR .

## 2024-07-07 NOTE — ED PROVIDER NOTES
[facility] EMERGENCY DEPT  EMERGENCY DEPARTMENT ENCOUNTER       Pt Name: Zeenat Rolle  MRN: [id number]  Birthdate 1945  Date of evaluation: 7/7/2024  Provider: Uri Gan MD   PCP: Lefty Segura MD  Note Started: 5:37 PM EDT 7/7/24     CHIEF COMPLAINT       Chief Complaint   Patient presents with    Fall     Comes from home and pt found on the ground with seizure like activity for approx. 2-3 and unresponsive but breathing. PMH- high K. Pt minimally responsive now per family. Sts has no h/o seizures.         HISTORY OF PRESENT ILLNESS: 1 or more elements      History From: Patient, Patient's Daughter, and Patient's Son  HPI Limitations: Patient's Age     Zeenat Rolle is a 79 y.o. female who presents after a syncopal episode.  Her son and daughter are with her and help to provide history.  They report she has a history of chronic kidney disease, high cholesterol.  Her son states he found her on the ground unresponsive.  He initially was concerned that she had seizure-like activity however she has no history of seizures.  He reports that she came to and was drowsy but acting normally.  No loss of bowel or bladder control, no tongue biting.  Patient reports she feels weak, nauseated.  She has no complaints of pain at this time.  No recent illnesses.  Denies fever, chills, chest pain, back pain.       Nursing Notes were all reviewed and agreed with or any disagreements were addressed in the HPI.     REVIEW OF SYSTEMS      Review of Systems     Positives and Pertinent negatives as per HPI.    PAST HISTORY     Past Medical History:  Past Medical History:   Diagnosis Date    Achalasia     GERD (gastroesophageal reflux disease)     Hypercholesterolemia     Hyperparathyroidism (HCC)     Stenosis of left subclavian artery (HCC)     Vertebral artery stenosis          Past Surgical History:  Past Surgical History:   Procedure Laterality Date    COLONOSCOPY  6/2006    [place]- nl- but had hx polyp in 2002    COLONOSCOPY      1. Septicemia (HCC)    2. Syncope and collapse    3. Hypothermia, initial encounter    4. Hypotension due to hypovolemia          DISPOSITION/PLAN   DISPOSITION Admitted 07/07/2024 01:30:10 PM      Admit Note: Pt is being admitted by Hospital Medicine. The results of their tests and reason(s) for their admission have been discussed with pt and/or available family. They convey agreement and understanding for the need to be admitted and for the admission diagnosis.     I am the Primary Clinician of Record.   Uri Gan MD (electronically signed)      (Please note that parts of this dictation were completed with voice recognition software. Quite often unanticipated grammatical, syntax, homophones, and other interpretive errors are inadvertently transcribed by the computer software. Please disregards these errors. Please excuse any errors that have escaped final proofreading.)         Uri Gan MD  07/07/24 1885

## 2024-07-07 NOTE — PLAN OF CARE
Problem: Skin/Tissue Integrity  Goal: Absence of new skin breakdown  Description: 1.  Monitor for areas of redness and/or skin breakdown  2.  Assess vascular access sites hourly  3.  Every 4-6 hours minimum:  Change oxygen saturation probe site  4.  Every 4-6 hours:  If on nasal continuous positive airway pressure, respiratory therapy assess nares and determine need for appliance change or resting period.  Outcome: Progressing     Problem: Safety - Adult  Goal: Free from fall injury  Outcome: Progressing  Flowsheets (Taken 7/7/2024 1800)  Free From Fall Injury: Instruct family/caregiver on patient safety     Problem: Discharge Planning  Goal: Discharge to home or other facility with appropriate resources  Outcome: Progressing  Flowsheets (Taken 7/7/2024 1800)  Discharge to home or other facility with appropriate resources: Identify barriers to discharge with patient and caregiver

## 2024-07-07 NOTE — H&P
Hospitalist Admission Note    NAME:   Zeenat Rolle   : 1945   MRN: [id number]     Date/Time: 2024 1:55 PM    Patient PCP: Lefty Segura MD    ______________________________________________________________________  Given the patient's current clinical presentation, I have a high level of concern for decompensation if discharged from the emergency department.  Complex decision making was performed, which includes reviewing the patient's available past medical records, laboratory results, and x-ray films.       My assessment of this patient's clinical condition and my plan of care is as follows.    Assessment / Plan:  Sepsis:  Patient had hypothermia, soft blood pressure, elevated white count, with unclear source. UA is still pending which is likely the source of infection  S/p ceftriaxone in the ED  Continue ceftriaxone   Continue iv fluids  Hold lasix in the setting of infection    Syncope   Fall:  Continue iv hydration  Orthostatic hypotension  Will send TSH and cortisol  Low suspicion for seizure  PT/OT consult        Hyperlipidemia:  Continue statin    CKD:  Will monitor creatinine                        Medical Decision Making:   I personally reviewed labs: cbc- elevated white count,   BMP: low sodium 132, elevated creatinine 1.6(which is around her baseline), lactic acid 1.11  I personally reviewed imaging:CXR negative for acute findings.   I personally reviewed EKG:  Toxic drug monitoring: none  Discussed case with: ED provider. After discussion I am in agreement that acuity of patient's medical condition necessitates hospital stay.      Code Status: full  DVT Prophylaxis:lovenox   Baseline:     Subjective:   CHIEF COMPLAINT: fall    HISTORY OF PRESENT ILLNESS:     Zeenat Rolle is a 79 y.o.  female with PMHx significant for GERD, hyperlipidemia presented to ED with c/o fall. Patient states that she tripped and had a fall.   Patient's son who is by the bedside, states that he heard loud noise  49 %    Monocytes % 4 (L) 5 - 13 %    Eosinophils % 1 0 - 7 %    Basophils % 0 0 - 1 %    Immature Granulocytes % 0 0.0 - 0.5 %    Neutrophils Absolute 13.4 (H) 1.8 - 8.0 K/UL    Lymphocytes Absolute 2.1 0.8 - 3.5 K/UL    Monocytes Absolute 0.7 0.0 - 1.0 K/UL    Eosinophils Absolute 0.2 0.0 - 0.4 K/UL    Basophils Absolute 0.1 0.0 - 0.1 K/UL    Immature Granulocytes Absolute 0.1 (H) 0.00 - 0.04 K/UL    Differential Type AUTOMATED     Comprehensive Metabolic Panel    Collection Time: 07/07/24 12:31 PM   Result Value Ref Range    Sodium 132 (L) 136 - 145 mmol/L    Potassium 4.5 3.5 - 5.1 mmol/L    Chloride 100 97 - 108 mmol/L    CO2 24 21 - 32 mmol/L    Anion Gap 8 5 - 15 mmol/L    Glucose 110 (H) 65 - 100 mg/dL    BUN 36 (H) 6 - 20 MG/DL    Creatinine 1.63 (H) 0.55 - 1.02 MG/DL    BUN/Creatinine Ratio 22 (H) 12 - 20      Est, Glom Filt Rate 31 (L) >60 ml/min/1.73m2    Calcium 9.5 8.5 - 10.1 MG/DL    Total Bilirubin 0.3 0.2 - 1.0 MG/DL    ALT 35 12 - 78 U/L    AST 32 15 - 37 U/L    Alk Phosphatase 176 (H) 45 - 117 U/L    Total Protein 8.1 6.4 - 8.2 g/dL    Albumin 3.7 3.5 - 5.0 g/dL    Globulin 4.4 (H) 2.0 - 4.0 g/dL    Albumin/Globulin Ratio 0.8 (L) 1.1 - 2.2     Troponin    Collection Time: 07/07/24 12:31 PM   Result Value Ref Range    Troponin, High Sensitivity 13 0 - 51 ng/L   Extra Tubes Hold    Collection Time: 07/07/24 12:31 PM   Result Value Ref Range    Specimen HOld BLUE     Comment:        Add-on orders for these samples will be processed based on acceptable specimen integrity and analyte stability, which may vary by analyte.   POC Lactic Acid    Collection Time: 07/07/24  1:17 PM   Result Value Ref Range    POC Lactic Acid 1.11 0.40 - 2.00 mmol/L         XR CHEST PORTABLE    Result Date: 7/7/2024  EXAM: XR CHEST PORTABLE ACC#: [id number]  INDICATION: syncope, []  COMPARISON: Chest radiograph November 26, 2023 FINDINGS: AP portable chest radiograph. The lungs are adequately expanded. The heart size is

## 2024-07-07 NOTE — PROGRESS NOTES
1553: Bedside and Verbal shift change report given to Claribel Louis RN (oncoming nurse) by Margo Lambert RN (offgoing nurse). Report included the following information Nurse Handoff Report, Index, ED Encounter Summary, ED SBAR, Adult Overview, Intake/Output, MAR, Recent Results, Cardiac Rhythm SR, Alarm Parameters, Quality Measures, and Neuro Assessment.     1600: Pt transported in stable condition to ED 37 with all pt belongings. Family members at the bedside. Assessment completed. Pt repositioned in bed.     1720: Verbal transfer report completed by Claribel Louis RN to CRISTÓBAL House in [facility] [phone].    1743: Pt transported in stable condition to [facility] from ED with all pt belongings. Family member at the bedside to escort pt to [facility]. Pt is in no acute distress.

## 2024-07-08 ENCOUNTER — TELEPHONE (OUTPATIENT)
Age: 79
End: 2024-07-08

## 2024-07-08 LAB
ANION GAP SERPL CALC-SCNC: 6 MMOL/L (ref 5–15)
BASOPHILS # BLD: 0 K/UL (ref 0–0.1)
BASOPHILS NFR BLD: 0 % (ref 0–1)
BUN SERPL-MCNC: 32 MG/DL (ref 6–20)
BUN/CREAT SERPL: 24 (ref 12–20)
CALCIUM SERPL-MCNC: 8.5 MG/DL (ref 8.5–10.1)
CHLORIDE SERPL-SCNC: 108 MMOL/L (ref 97–108)
CO2 SERPL-SCNC: 22 MMOL/L (ref 21–32)
CORTIS AM PEAK SERPL-MCNC: 5.2 UG/DL (ref 4.3–22.45)
CREAT SERPL-MCNC: 1.32 MG/DL (ref 0.55–1.02)
DIFFERENTIAL METHOD BLD: ABNORMAL
EKG ATRIAL RATE: 62 BPM
EKG DIAGNOSIS: NORMAL
EKG P AXIS: 61 DEGREES
EKG P-R INTERVAL: 222 MS
EKG Q-T INTERVAL: 430 MS
EKG QRS DURATION: 108 MS
EKG QTC CALCULATION (BAZETT): 436 MS
EKG R AXIS: 55 DEGREES
EKG T AXIS: 85 DEGREES
EKG VENTRICULAR RATE: 62 BPM
EOSINOPHIL # BLD: 0.2 K/UL (ref 0–0.4)
EOSINOPHIL NFR BLD: 2 % (ref 0–7)
ERYTHROCYTE [DISTWIDTH] IN BLOOD BY AUTOMATED COUNT: 15.7 % (ref 11.5–14.5)
GLUCOSE SERPL-MCNC: 64 MG/DL (ref 65–100)
HCT VFR BLD AUTO: 24.1 % (ref 35–47)
HGB BLD-MCNC: 8 G/DL (ref 11.5–16)
IMM GRANULOCYTES # BLD AUTO: 0 K/UL (ref 0–0.04)
IMM GRANULOCYTES NFR BLD AUTO: 0 % (ref 0–0.5)
LYMPHOCYTES # BLD: 1.9 K/UL (ref 0.8–3.5)
LYMPHOCYTES NFR BLD: 17 % (ref 12–49)
MCH RBC QN AUTO: 29.7 PG (ref 26–34)
MCHC RBC AUTO-ENTMCNC: 33.2 G/DL (ref 30–36.5)
MCV RBC AUTO: 89.6 FL (ref 80–99)
MONOCYTES # BLD: 0.5 K/UL (ref 0–1)
MONOCYTES NFR BLD: 5 % (ref 5–13)
NEUTS SEG # BLD: 8.4 K/UL (ref 1.8–8)
NEUTS SEG NFR BLD: 76 % (ref 32–75)
NRBC # BLD: 0 K/UL (ref 0–0.01)
NRBC BLD-RTO: 0 PER 100 WBC
PLATELET # BLD AUTO: 202 K/UL (ref 150–400)
PMV BLD AUTO: 11.4 FL (ref 8.9–12.9)
POTASSIUM SERPL-SCNC: 4.9 MMOL/L (ref 3.5–5.1)
RBC # BLD AUTO: 2.69 M/UL (ref 3.8–5.2)
SODIUM SERPL-SCNC: 136 MMOL/L (ref 136–145)
T4 FREE SERPL-MCNC: 1.1 NG/DL (ref 0.8–1.5)
TSH SERPL DL<=0.05 MIU/L-ACNC: 0.85 UIU/ML (ref 0.36–3.74)
WBC # BLD AUTO: 11.1 K/UL (ref 3.6–11)

## 2024-07-08 PROCEDURE — 97530 THERAPEUTIC ACTIVITIES: CPT

## 2024-07-08 PROCEDURE — 97162 PT EVAL MOD COMPLEX 30 MIN: CPT

## 2024-07-08 PROCEDURE — 84443 ASSAY THYROID STIM HORMONE: CPT

## 2024-07-08 PROCEDURE — 80048 BASIC METABOLIC PNL TOTAL CA: CPT

## 2024-07-08 PROCEDURE — 2060000000 HC ICU INTERMEDIATE R&B

## 2024-07-08 PROCEDURE — 36415 COLL VENOUS BLD VENIPUNCTURE: CPT

## 2024-07-08 PROCEDURE — 85025 COMPLETE CBC W/AUTO DIFF WBC: CPT

## 2024-07-08 PROCEDURE — 2580000003 HC RX 258: Performed by: INTERNAL MEDICINE

## 2024-07-08 PROCEDURE — 97166 OT EVAL MOD COMPLEX 45 MIN: CPT

## 2024-07-08 PROCEDURE — 97116 GAIT TRAINING THERAPY: CPT

## 2024-07-08 PROCEDURE — 6370000000 HC RX 637 (ALT 250 FOR IP): Performed by: INTERNAL MEDICINE

## 2024-07-08 PROCEDURE — 84439 ASSAY OF FREE THYROXINE: CPT

## 2024-07-08 PROCEDURE — 97535 SELF CARE MNGMENT TRAINING: CPT

## 2024-07-08 PROCEDURE — 6360000002 HC RX W HCPCS: Performed by: INTERNAL MEDICINE

## 2024-07-08 PROCEDURE — 82533 TOTAL CORTISOL: CPT

## 2024-07-08 RX ORDER — COSYNTROPIN 0.25 MG/ML
250 INJECTION, POWDER, FOR SOLUTION INTRAMUSCULAR; INTRAVENOUS ONCE
Status: COMPLETED | OUTPATIENT
Start: 2024-07-09 | End: 2024-07-09

## 2024-07-08 RX ADMIN — SODIUM CHLORIDE, PRESERVATIVE FREE 10 ML: 5 INJECTION INTRAVENOUS at 10:29

## 2024-07-08 RX ADMIN — SODIUM CHLORIDE 1000 MG: 900 INJECTION INTRAVENOUS at 17:53

## 2024-07-08 RX ADMIN — ATORVASTATIN CALCIUM 40 MG: 40 TABLET, FILM COATED ORAL at 21:21

## 2024-07-08 RX ADMIN — SODIUM CHLORIDE: 9 INJECTION, SOLUTION INTRAVENOUS at 23:03

## 2024-07-08 RX ADMIN — ASPIRIN 81 MG: 81 TABLET, COATED ORAL at 10:28

## 2024-07-08 RX ADMIN — SODIUM CHLORIDE, PRESERVATIVE FREE 10 ML: 5 INJECTION INTRAVENOUS at 21:22

## 2024-07-08 NOTE — TELEPHONE ENCOUNTER
----- Message from Reina Santana Cruz Benjose sent at 7/8/2024 12:36 PM EDT -----  Regarding: ECC Message to Provider  ECC Message to Provider    Relationship to Patient: Other Daniellerdavid - daughter     Additional Information : Daughter of the patient wanted to informed the PCP of the patient that the patient right now is in the Hospital.  --------------------------------------------------------------------------------------------------------------------------    Call Back Information: OK to leave message on voicemail  Preferred Call Back Number: [phone]

## 2024-07-08 NOTE — PLAN OF CARE
Problem: Physical Therapy - Adult  Goal: By Discharge: Performs mobility at highest level of function for planned discharge setting.  See evaluation for individualized goals.  Description: FUNCTIONAL STATUS PRIOR TO ADMISSION: Patient was independent and active without use of DME.    HOME SUPPORT PRIOR TO ADMISSION: The patient lived alone with daughter and neighbors available to provide assistance if needed.    Physical Therapy Goals  Initiated 7/8/2024  1.  Patient will move from supine to sit and sit to supine, scoot up and down, and roll side to side in bed with independence within 7 day(s).    2.  Patient will perform sit to stand with modified independence within 7 day(s).  3.  Patient will transfer from bed to chair and chair to bed with modified independence using the least restrictive device within 7 day(s).  4.  Patient will ambulate with modified independence for 100 feet with the least restrictive device within 7 day(s).       Outcome: Progressing     PHYSICAL THERAPY EVALUATION    Patient: Zeenat Rolle (79 y.o. female)  Date: 7/8/2024  Primary Diagnosis: Syncope and collapse [R55]  Septicemia (HCC) [A41.9]  Hypothermia, initial encounter [T68.XXXA]  Hypotension due to hypovolemia [E86.1]       Precautions:                        ASSESSMENT :   Pt seen for PT evaluation following admission for unresponsive episode, hypothermic and hypotensive on initial encounter, CT negative for acute abnormality. Pt independent at baseline, performed bed mobility with SBA, transfers and ambulation with CGA. Pt noted with orthostatic hypotension, though asymptomatic throughout. BP improved with seated rest break and therex (ankle pumps, hip adduction), though BP again dropped after amb trial in room. Pt left seated in bedside chair in NAD, all needs met, VSS. Pt reports daughter and neighbors are available to assist if needed, is eager to go home, agreeable to HHPT upon discharge.     VITALS:  SUPINE: 130/55  10.2522/[id number]  2. Shashank ARIAS, George MIGUEL, Cathy MIGUEL, Ragini MIGUEL. Association of AM-PAC \"6-Clicks\" Basic Mobility and Daily Activity Scores With Discharge Destination. Phys Ther. 2021 4;101(4):zdsw749. doi: 10.1093/ptj/kwft929. PMID: 71010023.  3. Adrienne MIGUEL, Abhilash HAN, Pham S, Elvis CERVANTES, Angelica KAHN. Activity Measure for Post-Acute Care \"6-Clicks\" Basic Mobility Scores Predict Discharge Destination After Acute Care Hospitalization in Select Patient Groups: A Retrospective, Observational Study. Arch Rehabil Res Clin Transl. 2022 16;4(3):974469. doi: 10.1016/j.arrct..538689. PMID: 26920536; PMCID: [id number].  4. Suzy ALVARENGA, Sylwia S, Soraida W, Alyssa P. AM-PAC Short Forms Manual 4.0. Revised 2020.                                                                                                                                                                                                                              Pain Ratin/10   Pain Intervention(s):       Activity Tolerance:   Good and signs and symptoms of orthostatic hypotension    After treatment:   Patient left in no apparent distress sitting up in chair, Call bell within reach, Bed/ chair alarm activated, and Updated patient's board on functional status and mobility recommendations    COMMUNICATION/EDUCATION:   The patient's plan of care was discussed with: occupational therapist and registered nurse    Patient Education  Education Given To: Patient  Education Provided: Role of Therapy;Plan of Care;Precautions;Transfer Training  Education Provided Comments: s/s orthostatic hypotension  Education Method: Verbal  Barriers to Learning: None  Education Outcome: Verbalized understanding;Demonstrated understanding    Thank you for this referral.  ALISHA MUKHERJEE, PT  Minutes: 33      Physical Therapy Evaluation Charge Determination   History Examination Presentation Decision-Making   MEDIUM  Complexity : 1-2 comorbidities / personal factors will impact

## 2024-07-08 NOTE — PROGRESS NOTES
Pt lost iv access. Stuck by primary RN and unsuccessful. Second RN attempted twice, unsuccessful. PICC team called for iv placement at 1636.     1636 @ 97.5 oral temp after placement of sheba beckman. MD gautam aware

## 2024-07-08 NOTE — PROGRESS NOTES
End of Shift Note    Bedside shift change report given to CRISTÓBAL Alvarado (oncoming nurse) by Yue Hudson RN (offgoing nurse).  Report included the following information SBAR, Kardex, Procedure Summary, Intake/Output, Recent Results, Cardiac Rhythm NSR, Alarm Parameters , and Quality Measures    Shift worked:       Shift summary and any significant changes:     NO major events last night.  UA and UC sent       Concerns for physician to address:       Zone phone for oncoming shift:          Activity:     Number times ambulated in hallways past shift:   Number of times OOB to chair past shift: 3    Cardiac:   Cardiac Monitoring: Yes           Access:  Current line(s): PIV     Genitourinary:   Urinary status: voiding    Respiratory:      Chronic home O2 use?: NO  Incentive spirometer at bedside: YES       GI:     Current diet:  ADULT DIET; Regular  Passing flatus: YES  Tolerating current diet: YES       Pain Management:   Patient states pain is manageable on current regimen: YES    Skin:     Interventions: turn team, increase time out of bed, and limit briefs    Patient Safety:  Fall Score:    Interventions: bed/chair alarm, gripper socks, and pt to call before getting OOB       Length of Stay:  Expected LOS: 3  Actual LOS: 1      Yue Hudson RN

## 2024-07-08 NOTE — PLAN OF CARE
Problem: Occupational Therapy - Adult  Goal: By Discharge: Performs self-care activities at highest level of function for planned discharge setting.  See evaluation for individualized goals.  Description: FUNCTIONAL STATUS PRIOR TO ADMISSION:  pt states independence with ADLs and mod I w/ ambulation, intermittent use of SPC   ,  ,  ,  ,  ,  ,  ,  ,  ,  ,       HOME SUPPORT: Patient lived alone, but her daughter lives 15 mins away and can assist, along with multiple neighbors and other family across the street.    Occupational Therapy Goals:  Initiated 7/8/2024  1.  Patient will perform grooming standing at the sink with Modified [place] within 7 day(s).  2.  Patient will perform upper body dressing with Modified [place] within 7 day(s).  3.  Patient will perform lower body dressing with Modified [place] within 7 day(s).  4.  Patient will perform toilet transfers with Modified [place]  within 7 day(s).  5.  Patient will perform all aspects of toileting with Modified [place] within 7 day(s).  6.  Patient will participate in upper extremity therapeutic exercise/activities with Modified [place] for 5 minutes within 7 day(s).    7.  Patient will utilize energy conservation techniques during functional activities with verbal cues within 7 day(s).   Outcome: Progressing    OCCUPATIONAL THERAPY EVALUATION    Patient: Zeenat Rolle (79 y.o. female)  Date: 7/8/2024  Primary Diagnosis: Syncope and collapse [R55]  Septicemia (HCC) [A41.9]  Hypothermia, initial encounter [T68.XXXA]  Hypotension due to hypovolemia [E86.1]         Precautions:                    ASSESSMENT :  The patient is limited by decreased high-level IADLs, activity tolerance, endurance, safety awareness, general weakness, asymptomatic OH .  Pt BIBEMS for syncopal episode with GLF, CT negative for any acute abnormality.    Based on the impairments listed above the pt warrants further acute care OT services. Pt was on RA and  rest breaks    After treatment:   Patient left in no apparent distress sitting up in chair, Call bell within reach, and Bed/ chair alarm activated    COMMUNICATION/EDUCATION:   The patient's plan of care was discussed with: physical therapist and registered nurse         Thank you for this referral.  Gil Jiang OT  Minutes: 34    Occupational Therapy Evaluation Charge Determination   History Examination Decision-Making   MEDIUM Complexity : Expanded review of history including physical, cognitive and psychocial history  MEDIUM Complexity: 3-5 Performance deficits relating to physical, cognitive, or psychosocial skills that result in activity limitations and/or participation restrictions MEDIUM Complexity: Patient may present with comorbidities that affect occupational performance. Minimal to moderate modifications of tasks or assist (eg. physical or verbal) with assist is necessary to enable pt to complete eval   Based on the above components, the patient evaluation is determined to be of the following complexity level: Medium

## 2024-07-09 ENCOUNTER — APPOINTMENT (OUTPATIENT)
Facility: HOSPITAL | Age: 79
DRG: 641 | End: 2024-07-09
Attending: INTERNAL MEDICINE
Payer: MEDICARE

## 2024-07-09 ENCOUNTER — TELEPHONE (OUTPATIENT)
Age: 79
End: 2024-07-09

## 2024-07-09 VITALS
SYSTOLIC BLOOD PRESSURE: 94 MMHG | HEART RATE: 68 BPM | HEIGHT: 60 IN | OXYGEN SATURATION: 93 % | RESPIRATION RATE: 18 BRPM | WEIGHT: 122.8 LBS | TEMPERATURE: 97.6 F | BODY MASS INDEX: 24.11 KG/M2 | DIASTOLIC BLOOD PRESSURE: 67 MMHG

## 2024-07-09 LAB
ALBUMIN SERPL-MCNC: 2.8 G/DL (ref 3.5–5)
ALBUMIN/GLOB SERPL: 0.8 (ref 1.1–2.2)
ALP SERPL-CCNC: 135 U/L (ref 45–117)
ALT SERPL-CCNC: 29 U/L (ref 12–78)
ANION GAP SERPL CALC-SCNC: 7 MMOL/L (ref 5–15)
AST SERPL-CCNC: 22 U/L (ref 15–37)
BASOPHILS # BLD: 0 K/UL (ref 0–0.1)
BASOPHILS NFR BLD: 1 % (ref 0–1)
BILIRUB SERPL-MCNC: 0.3 MG/DL (ref 0.2–1)
BUN SERPL-MCNC: 22 MG/DL (ref 6–20)
BUN/CREAT SERPL: 19 (ref 12–20)
CALCIUM SERPL-MCNC: 8.6 MG/DL (ref 8.5–10.1)
CHLORIDE SERPL-SCNC: 110 MMOL/L (ref 97–108)
CO2 SERPL-SCNC: 21 MMOL/L (ref 21–32)
CORTIS SERPL-MCNC: 24.1 UG/DL
CORTIS SERPL-MCNC: 31.5 UG/DL
CORTIS SERPL-MCNC: 36 UG/DL
CREAT SERPL-MCNC: 1.17 MG/DL (ref 0.55–1.02)
DIFFERENTIAL METHOD BLD: ABNORMAL
ECHO AO ROOT DIAM: 3.5 CM
ECHO AO ROOT INDEX: 2.3 CM/M2
ECHO AV AREA PEAK VELOCITY: 2.9 CM2
ECHO AV AREA/BSA PEAK VELOCITY: 1.9 CM2/M2
ECHO AV PEAK GRADIENT: 6 MMHG
ECHO AV PEAK VELOCITY: 1.2 M/S
ECHO AV VELOCITY RATIO: 0.83
ECHO BSA: 1.54 M2
ECHO LA DIAMETER INDEX: 2.17 CM/M2
ECHO LA DIAMETER: 3.3 CM
ECHO LA TO AORTIC ROOT RATIO: 0.94
ECHO LV FRACTIONAL SHORTENING: 30 % (ref 28–44)
ECHO LV INTERNAL DIMENSION DIASTOLE INDEX: 1.97 CM/M2
ECHO LV INTERNAL DIMENSION DIASTOLIC: 3 CM (ref 3.9–5.3)
ECHO LV INTERNAL DIMENSION SYSTOLIC INDEX: 1.38 CM/M2
ECHO LV INTERNAL DIMENSION SYSTOLIC: 2.1 CM
ECHO LV IVSD: 1.1 CM (ref 0.6–0.9)
ECHO LV MASS 2D: 88.5 G (ref 67–162)
ECHO LV MASS INDEX 2D: 58.2 G/M2 (ref 43–95)
ECHO LV POSTERIOR WALL DIASTOLIC: 1 CM (ref 0.6–0.9)
ECHO LV RELATIVE WALL THICKNESS RATIO: 0.67
ECHO LVOT AREA: 3.8 CM2
ECHO LVOT DIAM: 2.2 CM
ECHO LVOT MEAN GRADIENT: 2 MMHG
ECHO LVOT PEAK GRADIENT: 4 MMHG
ECHO LVOT PEAK VELOCITY: 1 M/S
ECHO LVOT STROKE VOLUME INDEX: 67 ML/M2
ECHO LVOT SV: 101.8 ML
ECHO LVOT VTI: 26.8 CM
ECHO PV MAX VELOCITY: 0.9 M/S
ECHO PV PEAK GRADIENT: 3 MMHG
ECHO RV FREE WALL PEAK S': 12 CM/S
ECHO RV TAPSE: 2.2 CM (ref 1.7–?)
ECHO TV REGURGITANT MAX VELOCITY: 2.42 M/S
ECHO TV REGURGITANT PEAK GRADIENT: 24 MMHG
EKG ATRIAL RATE: 60 BPM
EKG DIAGNOSIS: NORMAL
EKG P AXIS: 51 DEGREES
EKG P-R INTERVAL: 200 MS
EKG Q-T INTERVAL: 408 MS
EKG QRS DURATION: 92 MS
EKG QTC CALCULATION (BAZETT): 408 MS
EKG R AXIS: 57 DEGREES
EKG T AXIS: 76 DEGREES
EKG VENTRICULAR RATE: 60 BPM
EOSINOPHIL # BLD: 0.3 K/UL (ref 0–0.4)
EOSINOPHIL NFR BLD: 4 % (ref 0–7)
ERYTHROCYTE [DISTWIDTH] IN BLOOD BY AUTOMATED COUNT: 15.9 % (ref 11.5–14.5)
GLOBULIN SER CALC-MCNC: 3.7 G/DL (ref 2–4)
GLUCOSE SERPL-MCNC: 71 MG/DL (ref 65–100)
HCT VFR BLD AUTO: 23.2 % (ref 35–47)
HGB BLD-MCNC: 7.6 G/DL (ref 11.5–16)
IMM GRANULOCYTES # BLD AUTO: 0 K/UL (ref 0–0.04)
IMM GRANULOCYTES NFR BLD AUTO: 0 % (ref 0–0.5)
LYMPHOCYTES # BLD: 2.5 K/UL (ref 0.8–3.5)
LYMPHOCYTES NFR BLD: 30 % (ref 12–49)
MCH RBC QN AUTO: 29.5 PG (ref 26–34)
MCHC RBC AUTO-ENTMCNC: 32.8 G/DL (ref 30–36.5)
MCV RBC AUTO: 89.9 FL (ref 80–99)
MONOCYTES # BLD: 0.5 K/UL (ref 0–1)
MONOCYTES NFR BLD: 6 % (ref 5–13)
NEUTS SEG # BLD: 4.9 K/UL (ref 1.8–8)
NEUTS SEG NFR BLD: 59 % (ref 32–75)
NRBC # BLD: 0 K/UL (ref 0–0.01)
NRBC BLD-RTO: 0 PER 100 WBC
PLATELET # BLD AUTO: 202 K/UL (ref 150–400)
PMV BLD AUTO: 11.4 FL (ref 8.9–12.9)
POTASSIUM SERPL-SCNC: 4.6 MMOL/L (ref 3.5–5.1)
PROT SERPL-MCNC: 6.5 G/DL (ref 6.4–8.2)
RBC # BLD AUTO: 2.58 M/UL (ref 3.8–5.2)
SODIUM SERPL-SCNC: 138 MMOL/L (ref 136–145)
TROPONIN I SERPL HS-MCNC: 15 NG/L (ref 0–51)
WBC # BLD AUTO: 8.2 K/UL (ref 3.6–11)

## 2024-07-09 PROCEDURE — 84484 ASSAY OF TROPONIN QUANT: CPT

## 2024-07-09 PROCEDURE — 93308 TTE F-UP OR LMTD: CPT

## 2024-07-09 PROCEDURE — 2580000003 HC RX 258: Performed by: INTERNAL MEDICINE

## 2024-07-09 PROCEDURE — 36415 COLL VENOUS BLD VENIPUNCTURE: CPT

## 2024-07-09 PROCEDURE — 95819 EEG AWAKE AND ASLEEP: CPT

## 2024-07-09 PROCEDURE — 6370000000 HC RX 637 (ALT 250 FOR IP): Performed by: INTERNAL MEDICINE

## 2024-07-09 PROCEDURE — 93005 ELECTROCARDIOGRAM TRACING: CPT | Performed by: INTERNAL MEDICINE

## 2024-07-09 PROCEDURE — 80053 COMPREHEN METABOLIC PANEL: CPT

## 2024-07-09 PROCEDURE — 95819 EEG AWAKE AND ASLEEP: CPT | Performed by: PSYCHIATRY & NEUROLOGY

## 2024-07-09 PROCEDURE — 85025 COMPLETE CBC W/AUTO DIFF WBC: CPT

## 2024-07-09 PROCEDURE — 82533 TOTAL CORTISOL: CPT

## 2024-07-09 PROCEDURE — 6360000002 HC RX W HCPCS: Performed by: INTERNAL MEDICINE

## 2024-07-09 RX ADMIN — SODIUM CHLORIDE: 9 INJECTION, SOLUTION INTRAVENOUS at 14:12

## 2024-07-09 RX ADMIN — ENOXAPARIN SODIUM 30 MG: 100 INJECTION SUBCUTANEOUS at 09:23

## 2024-07-09 RX ADMIN — COSYNTROPIN 250 MCG: 0.25 INJECTION, POWDER, LYOPHILIZED, FOR SOLUTION INTRAMUSCULAR; INTRAVENOUS at 09:23

## 2024-07-09 RX ADMIN — SODIUM CHLORIDE, PRESERVATIVE FREE 10 ML: 5 INJECTION INTRAVENOUS at 09:23

## 2024-07-09 RX ADMIN — ASPIRIN 81 MG: 81 TABLET, COATED ORAL at 09:23

## 2024-07-09 RX ADMIN — SODIUM CHLORIDE 1000 MG: 900 INJECTION INTRAVENOUS at 14:13

## 2024-07-09 NOTE — PROGRESS NOTES
1910 - Bedside shift change report given to Fallon Aburto RN (oncoming nurse) by Jenny Ayon RN   (offgoing nurse). Report included the following information Nurse Handoff Report, Index, ED Encounter Summary, ED SBAR, Adult Overview, Surgery Report, Intake/Output, MAR, Recent Results, Med Rec Status, Cardiac Rhythm NSR/ Sinus Diogenes, and Alarm Parameters.

## 2024-07-09 NOTE — PROCEDURES
PROCEDURE: ROUTINE INPATIENT EEG  NAME:   Zeenat Rolle  ACCOUNT NUMBER : [id number]  MRN:   [id number]  DATE OF SERVICE: 7/9/2024     HISTORY/INDICATION: Patient is an 79-year-old female admitted after she was found unresponsive on the floor.  EEG is performed to assess for evidence of underlying epilepsy.    MEDICATIONS:   Current Facility-Administered Medications   Medication Dose Route Frequency Provider Last Rate Last Admin    sodium chloride flush 0.9 % injection 5-40 mL  5-40 mL IntraVENous 2 times per day Anastacia Min MD   10 mL at 07/09/24 0923    sodium chloride flush 0.9 % injection 5-40 mL  5-40 mL IntraVENous PRN Anastacia Min MD        0.9 % sodium chloride infusion   IntraVENous PRN Anastacia Min MD        enoxaparin Sodium (LOVENOX) injection 30 mg  30 mg SubCUTAneous Daily Anastacia Min MD   30 mg at 07/09/24 0923    ondansetron (ZOFRAN-ODT) disintegrating tablet 4 mg  4 mg Oral Q8H PRN Anastacia Min MD        Or    ondansetron (ZOFRAN) injection 4 mg  4 mg IntraVENous Q6H PRN Anastacia Min MD        polyethylene glycol (GLYCOLAX) packet 17 g  17 g Oral Daily PRN Anastacia Min MD        acetaminophen (TYLENOL) tablet 650 mg  650 mg Oral Q6H PRN Anastacia Min MD        Or    acetaminophen (TYLENOL) suppository 650 mg  650 mg Rectal Q6H PRN Anastacia Min MD        cefTRIAXone (ROCEPHIN) 1,000 mg in sodium chloride 0.9 % 50 mL IVPB (mini-bag)  1,000 mg IntraVENous Q24H Anastacia Min MD   Stopped at 07/08/24 1832    0.9 % sodium chloride infusion   IntraVENous Continuous CieslaGato Jr., MD 75 mL/hr at 07/08/24 2303 New Bag at 07/08/24 2303    atorvastatin (LIPITOR) tablet 40 mg  40 mg Oral Nightly Anastacia Min MD   40 mg at 07/08/24 2121    aspirin EC tablet 81 mg  81 mg Oral Daily Anastacia Min MD   81 mg at 07/09/24 0923       CONDITIONS OF RECORDING: This is a routine 21-channel EEG recording performed in accordance with the international 10-20 system

## 2024-07-09 NOTE — CARE COORDINATION
Care Management Initial Assessment       RUR: 12% (low RUR)  Readmission? No  1st IM letter given? Yes - 2nd IM done 7/9/2024  1st  letter given: No     Patient agreeable to  [facility] being [facility]; [facility]  [facility] >48 hours; CM asked regarding exact SOC date and contact information left on AVS.     07/09/24 1402   Service Assessment   Patient Orientation Alert and Oriented   Cognition Alert   History Provided By Patient   Primary Caregiver Self   Accompanied By/Relationship Family   Support Systems Children   Patient's Healthcare Decision Maker is: Legal Next of Kin   PCP Verified by CM Yes   Last Visit to PCP Within last 3 months  (6/26/2024)   Prior Functional Level Independent in ADLs/IADLs   Current Functional Level Independent in ADLs/IADLs   Can patient return to prior living arrangement Yes   Ability to make needs known: Good   Family able to assist with home care needs: Yes   Would you like for me to discuss the discharge plan with any other family members/significant others, and if so, who? No   Financial Resources Medicare   Community Resources None   Social/Functional History   Lives With Alone   Type of Home House   Home Layout Two level  (3 SHILPI, 13 steps to the second level)   Home Equipment Cane  (walking sticks)   ADL Assistance Independent   Homemaking Assistance Independent   Ambulation Assistance Independent   Transfer Assistance Independent   Active  Yes   Mode of Transportation Car   Discharge Planning   Type of Residence House   Living Arrangements Alone   Current Services Prior To Admission None   Potential Assistance Needed N/A   DME Ordered? No   Potential Assistance Purchasing Medications No   Type of Home Care Services None   Patient expects to be discharged to: House   Services At/After Discharge   Transition of Care Consult (CM Consult) Home Health   Services At/After Discharge PT;OT   Mode of

## 2024-07-09 NOTE — PROGRESS NOTES
Bedside shift change report given to CRISTÓBAL Galvan(oncoming nurse) by Fallon Aburto RN   (offgoing nurse). Report included the following information Nurse Handoff Report, Index, ED Encounter Summary, ED SBAR, Adult Overview, Surgery Report, Intake/Output, MAR, Recent Results, Med Rec Status, Cardiac Rhythm NSR/ Sinus Diogenes, and Alarm Parameters.

## 2024-07-09 NOTE — PROGRESS NOTES
Hospitalist Progress Note    NAME:   Zeenat Rolle   : 1945   MRN: [id number]     Date: 2024    Patient PCP: Lefty Segura MD    Hospital Problem list:       Possible convulsive syncope POA  Recent outpatient hyperkalemia K 5.7 POA now resolved, treated with Po lasix  Hypotensive in ED 71/48 POA baseline not on home blood pressure medicines  Transient hypothermia to 93.3 POA  Leukocytosis 16.4 POA  Patient just got up walking, reportedly bent over to pick something up   When she straightened up she fell backwards the family thinks to the floor  Fell at home, found unresponsive on floor   Took a minute or 2 to wake up but was not confused once awake   Son did notice hands were shaking a little bit  Recently had p.o. Lasix increased because of hyperkalemia   Refer to renal clinic with Dr. Elder this week for workup  Head CT scan with no acute findings  Initial concern in ED was for sepsis given leukocytosis and hypothermia and low BP   No clear evidence of infection, I think the patient had SIRS instead of sepsis   Procalcitonin 0.05   Lactate 1.1   Paired blood cultures negative so far   UA 0-4 WBCs, 0-5 RBCs, negative bacteria   Portable chest x-ray with no airspace disease   Abdomen benign   IV ceftriaxone for now but will stop in 24 hours if cultures negative  TSH was normal at 0.85  Hypothermia is transient, not always indicative of infection   ? AI   ?  Altered thermoregulation with age   Normal TSH  More concern for hypovolemia with the Lasix use   With the hyperkalemia and low blood pressure need to rule out AI    A.m. cortisol indeterminate at 5.2    Will order Cortrosyn stim test in a.m.  Telemetry with no arrhythmias  IV fluids   Hold Lasix for now  Repeat cardiac enzymes  Check echocardiogram  Order EEG for a.m., asked neuro to check on   I think a pavel seizure is less likely  PT and OT consults     Hyperlipidemia POA  Continue statin     CKD stage 3 POA  Serial labs    Medical

## 2024-07-09 NOTE — DISCHARGE INSTRUCTIONS
Get up slowly from laying down to standing, would sit for 1 to 2 minutes before you attempt to stand    Hold the Lasix until you follow-up with Dr. Mayres

## 2024-07-09 NOTE — PROGRESS NOTES
0700 Bedside and Verbal shift change report given to Mandy RN (oncoming nurse) by Fallon RN (offgoing nurse). Report included the following information Nurse Handoff Report, Index, ED Encounter Summary, ED SBAR, Intake/Output, MAR, Recent Results, and Cardiac Rhythm Sinus Diogenes, NSR .     1645 Reviewed discharge instructions and education with patient. Allowed opportunity for questions. Patient discharged to Home via  family

## 2024-07-09 NOTE — PROGRESS NOTES
Attempted to schedule PCP hospital follow up. Sent PCP office a message, awaiting return call from PCP office with appt information. [facility] placed [facility] information AVS for patient resource.  Pending patient discharge. Heydi Wright, Care Management Assistant

## 2024-07-09 NOTE — PLAN OF CARE
Problem: Skin/Tissue Integrity  Goal: Absence of new skin breakdown  Description: 1.  Monitor for areas of redness and/or skin breakdown  2.  Assess vascular access sites hourly  3.  Every 4-6 hours minimum:  Change oxygen saturation probe site  4.  Every 4-6 hours:  If on nasal continuous positive airway pressure, respiratory therapy assess nares and determine need for appliance change or resting period.  Outcome: Progressing     Problem: Safety - Adult  Goal: Free from fall injury  Outcome: Progressing  Flowsheets (Taken 7/8/2024 1910)  Free From Fall Injury:   Instruct family/caregiver on patient safety   Based on caregiver fall risk screen, instruct family/caregiver to ask for assistance with transferring infant if caregiver noted to have fall risk factors     Problem: Discharge Planning  Goal: Discharge to home or other facility with appropriate resources  Outcome: Progressing  Flowsheets (Taken 7/8/2024 1910)  Discharge to home or other facility with appropriate resources: Identify barriers to discharge with patient and caregiver     Problem: Pain  Goal: Verbalizes/displays adequate comfort level or baseline comfort level  Outcome: Progressing

## 2024-07-09 NOTE — PLAN OF CARE
Problem: Skin/Tissue Integrity  Goal: Absence of new skin breakdown  Description: 1.  Monitor for areas of redness and/or skin breakdown  2.  Assess vascular access sites hourly  3.  Every 4-6 hours minimum:  Change oxygen saturation probe site  4.  Every 4-6 hours:  If on nasal continuous positive airway pressure, respiratory therapy assess nares and determine need for appliance change or resting period.  7/9/2024 1822 by Mandy Rai RN  Outcome: Adequate for Discharge  7/9/2024 0427 by Fallon Aburto RN  Outcome: Progressing     Problem: Safety - Adult  Goal: Free from fall injury  7/9/2024 1822 by Mandy Rai RN  Outcome: Adequate for Discharge  7/9/2024 0427 by Fallon Aburto RN  Outcome: Progressing  Flowsheets (Taken 7/8/2024 1910)  Free From Fall Injury:   Instruct family/caregiver on patient safety   Based on caregiver fall risk screen, instruct family/caregiver to ask for assistance with transferring infant if caregiver noted to have fall risk factors     Problem: Discharge Planning  Goal: Discharge to home or other facility with appropriate resources  7/9/2024 1822 by Mandy Rai RN  Outcome: Adequate for Discharge  7/9/2024 0427 by Fallon Aburto RN  Outcome: Progressing  Flowsheets (Taken 7/8/2024 1910)  Discharge to home or other facility with appropriate resources: Identify barriers to discharge with patient and caregiver     Problem: Pain  Goal: Verbalizes/displays adequate comfort level or baseline comfort level  7/9/2024 1822 by Mandy Rai RN  Outcome: Adequate for Discharge  7/9/2024 0427 by Fallon Aburto RN  Outcome: Progressing

## 2024-07-09 NOTE — DISCHARGE SUMMARY
Hospitalist Discharge Note    NAME:   Zeenat Rolle   : 1945   MRN: [id number]     Admit date: 2024    Discharge date: 24    PCP: Lefty Segura MD    Discharge Diagnoses:    Syncope POA related to hypovolemia from lasix    Recent outpatient hyperkalemia K 5.7 POA now resolved, treated with PO lasix    Hypotensive in ED 71/48 POA baseline not on home blood pressure medicines    Transient hypothermia to 93.3 POA    Leukocytosis 16.4 POA resolved, no evidence of sepsis     Hyperlipidemia POA     CKD stage 3 POA    code Status: full      Discharge Medications:  Current Discharge Medication List        CONTINUE these medications which have NOT CHANGED    Details   atorvastatin (LIPITOR) 40 MG tablet Take 1 tablet by mouth daily For cholesterol  Qty: 90 tablet, Refills: 3      aspirin 81 MG EC tablet Take 1 tablet by mouth daily           STOP taking these medications       furosemide (LASIX) 20 MG tablet Comments:   Reason for Stopping:               Follow-up Information       Follow up With Specialties Details Why Contact Info    Lefty Segura MD Family Medicine Schedule an appointment as soon as possible for a visit in 1 week(s) For PCP hospital follow up. [address] [facility] [place] [address] [phone]      [facility]  Follow up Please contact to have a provider see you at home, if needed. Phone: [phone]    [facility]  Follow up This is your home health agency.  Please contact with any questions or concerns. Phone: [phone]      Address: [address] [place] [address], [address] [place] [address]    Vani Mayers MD Nephrology Follow up on 7/10/2024 Keep your scheduled appointment with Dr. Mayers at the office they told you to come to, the address here may not be the one you are actually going to [address] [place] [address]  [address]  [facility] [address] [phone]              Time spent on discharge:   I spent 38  23.2*    202 202       Recent Labs     07/07/24  1231 07/08/24  0359 07/09/24  0617   * 136 138   K 4.5 4.9 4.6    108 110*   CO2 24 22 21   GLUCOSE 110* 64* 71   BUN 36* 32* 22*   CREATININE 1.63* 1.32* 1.17*   CALCIUM 9.5 8.5 8.6   BILITOT 0.3  --  0.3   AST 32  --  22   ALT 35  --  29         Signed: Gato June Jr, MD

## 2024-07-10 ENCOUNTER — TELEPHONE (OUTPATIENT)
Age: 79
End: 2024-07-10

## 2024-07-10 ENCOUNTER — TELEPHONE (OUTPATIENT)
Facility: HOSPITAL | Age: 79
End: 2024-07-10

## 2024-07-12 ENCOUNTER — TELEPHONE (OUTPATIENT)
Facility: HOSPITAL | Age: 79
End: 2024-07-12

## 2024-07-12 ENCOUNTER — TELEPHONE (OUTPATIENT)
Age: 79
End: 2024-07-12

## 2024-07-12 NOTE — TELEPHONE ENCOUNTER
0842 - Discharge summary sent via [facility] to [facility].      Kristan Presley, VIKKIN, RN  [facility] Care Management

## 2024-07-12 NOTE — TELEPHONE ENCOUNTER
Care Transitions Initial Follow Up Call    Outreach made within 2 business days of discharge: Yes    Patient: Zeenat Rolle Patient : 1945   MRN: [id number]  Reason for Admission: There are no discharge diagnoses documented for the most recent discharge.  Discharge Date: 24       Spoke with: patient    Discharge department/facility: [facility]    TCM Interactive Patient Contact:  Was patient able to fill all prescriptions: Yes  Was patient instructed to bring all medications to the follow-up visit: Yes  Is patient taking all medications as directed in the discharge summary? Yes  Does patient understand their discharge instructions: Yes  Does patient have questions or concerns that need addressed prior to 7-14 day follow up office visit: no    Scheduled appointment with PCP within 7-14 days    Follow Up  Future Appointments   Date Time Provider Department Center   2024 12:00 PM Lefty Segura MD [facility] AM[facility]   2024  9:00 AM Lefty Segura MD [facility] AM[facility]       Xin Gibbs

## 2024-07-13 LAB
BACTERIA SPEC CULT: NORMAL
BACTERIA SPEC CULT: NORMAL
SERVICE CMNT-IMP: NORMAL
SERVICE CMNT-IMP: NORMAL

## 2024-07-19 ENCOUNTER — HOSPITAL ENCOUNTER (OUTPATIENT)
Facility: HOSPITAL | Age: 79
End: 2024-07-19
Attending: INTERNAL MEDICINE
Payer: MEDICARE

## 2024-07-19 ENCOUNTER — CLINICAL DOCUMENTATION (OUTPATIENT)
Age: 79
End: 2024-07-19

## 2024-07-19 DIAGNOSIS — N17.9 ACUTE RENAL FAILURE, UNSPECIFIED ACUTE RENAL FAILURE TYPE (HCC): ICD-10-CM

## 2024-07-19 PROCEDURE — 76770 US EXAM ABDO BACK WALL COMP: CPT

## 2024-07-19 RX ORDER — TRIAMCINOLONE ACETONIDE 5 MG/G
CREAM TOPICAL 2 TIMES DAILY PRN
COMMUNITY

## 2024-07-23 ENCOUNTER — OFFICE VISIT (OUTPATIENT)
Age: 79
End: 2024-07-23
Payer: MEDICARE

## 2024-07-23 VITALS
HEART RATE: 60 BPM | BODY MASS INDEX: 23.95 KG/M2 | TEMPERATURE: 96.9 F | OXYGEN SATURATION: 95 % | RESPIRATION RATE: 16 BRPM | SYSTOLIC BLOOD PRESSURE: 99 MMHG | HEIGHT: 60 IN | DIASTOLIC BLOOD PRESSURE: 49 MMHG | WEIGHT: 122 LBS

## 2024-07-23 DIAGNOSIS — E87.5 HYPERKALEMIA: Primary | ICD-10-CM

## 2024-07-23 DIAGNOSIS — R55 SYNCOPE, UNSPECIFIED SYNCOPE TYPE: ICD-10-CM

## 2024-07-23 LAB
ANION GAP SERPL CALC-SCNC: 8 MMOL/L (ref 5–15)
BUN SERPL-MCNC: 33 MG/DL (ref 6–20)
BUN/CREAT SERPL: 25 (ref 12–20)
CALCIUM SERPL-MCNC: 9.5 MG/DL (ref 8.5–10.1)
CHLORIDE SERPL-SCNC: 109 MMOL/L (ref 97–108)
CO2 SERPL-SCNC: 21 MMOL/L (ref 21–32)
CREAT SERPL-MCNC: 1.31 MG/DL (ref 0.55–1.02)
GLUCOSE SERPL-MCNC: 61 MG/DL (ref 65–100)
POTASSIUM SERPL-SCNC: 6.1 MMOL/L (ref 3.5–5.1)
SODIUM SERPL-SCNC: 138 MMOL/L (ref 136–145)

## 2024-07-23 PROCEDURE — 99213 OFFICE O/P EST LOW 20 MIN: CPT | Performed by: FAMILY MEDICINE

## 2024-07-23 PROCEDURE — G8420 CALC BMI NORM PARAMETERS: HCPCS | Performed by: FAMILY MEDICINE

## 2024-07-23 PROCEDURE — G8427 DOCREV CUR MEDS BY ELIG CLIN: HCPCS | Performed by: FAMILY MEDICINE

## 2024-07-23 PROCEDURE — 1123F ACP DISCUSS/DSCN MKR DOCD: CPT | Performed by: FAMILY MEDICINE

## 2024-07-23 PROCEDURE — 1036F TOBACCO NON-USER: CPT | Performed by: FAMILY MEDICINE

## 2024-07-23 PROCEDURE — G8399 PT W/DXA RESULTS DOCUMENT: HCPCS | Performed by: FAMILY MEDICINE

## 2024-07-23 PROCEDURE — 1111F DSCHRG MED/CURRENT MED MERGE: CPT | Performed by: FAMILY MEDICINE

## 2024-07-23 PROCEDURE — 1090F PRES/ABSN URINE INCON ASSESS: CPT | Performed by: FAMILY MEDICINE

## 2024-07-23 NOTE — PROGRESS NOTES
Zeenat Rolle (:  1945) is a 79 y.o. female,Established patient, here for evaluation of the following chief complaint(s):  Follow-Up from Hospital      Assessment & Plan   1. Hyperkalemia  -     Basic Metabolic Panel; Future  2. Syncope, unspecified syncope type  Will recheck  potassium in 6 weeks.     No follow-ups on file.       Subjective   HPIPt discharged from hospital 7-10 after stay for hypotension ro sepsis, syncope. Passed out at home prior to admission. Was picking objects off the floor. Got very dizzy and fell backwards. Was kept in hospital for 4 days. Sepsis was ruled out. Had her lasix dced. Feeling much better now. Had been started on low dose Lasix for hypokalemia. We tested her for addisons disease 2 years ago.     Review of Systems       Objective   Physical Exam  Cardiovascular:      Rate and Rhythm: Normal rate and regular rhythm.      Heart sounds: Normal heart sounds. No murmur heard.     Comments: Bp 100/60 sitting, 100/60 standing.   Pulmonary:      Effort: Pulmonary effort is normal.      Breath sounds: Normal breath sounds.   Abdominal:      General: Abdomen is flat. Bowel sounds are normal.      Palpations: Abdomen is soft.   Musculoskeletal:      Right lower leg: No edema.      Left lower leg: No edema.                  An electronic signature was used to authenticate this note.    --Lefty Segura MD

## 2024-07-23 NOTE — PROGRESS NOTES
Chief Complaint   Patient presents with    Follow-Up from Hospital         1. \"Have you been to the ER, urgent care clinic since your last visit?  Hospitalized since your last visit?\" 7/7/24-7/9/24-[facility] SYNCOPE AND COLLAPSE    2. \"Have you seen or consulted any other health care providers outside of the [facility] since your last visit?\" NEPHROLOGY-DR. SIERRA     3. For patients aged 45-75: Has the patient had a colonoscopy / FIT/ Cologuard? N/A      If the patient is female:    4. For patients aged 40-74: Has the patient had a mammogram within the past 2 years? N/A      5. For patients aged 21-65: Has the patient had a pap smear? N/A      Health Maintenance Due   Topic Date Due    Shingles vaccine (1 of 2) Never done    Respiratory Syncytial Virus (RSV) Pregnant or age 60 yrs+ (1 - 1-dose 60+ series) Never done    COVID-19 Vaccine (7 - 2023-24 season) 11/24/2023    DTaP/Tdap/Td vaccine (2 - Td or Tdap) 01/28/2024

## 2024-07-29 ENCOUNTER — TELEPHONE (OUTPATIENT)
Age: 79
End: 2024-07-29

## 2024-07-30 ENCOUNTER — TELEPHONE (OUTPATIENT)
Age: 79
End: 2024-07-30

## 2024-07-30 NOTE — TELEPHONE ENCOUNTER
Gabriella ( OT ) with [facility] states that patient is declining OT she fills like she does not need it

## 2024-07-31 ENCOUNTER — TELEPHONE (OUTPATIENT)
Age: 79
End: 2024-07-31

## 2024-08-01 ENCOUNTER — TELEPHONE (OUTPATIENT)
Age: 79
End: 2024-08-01

## 2024-08-01 ENCOUNTER — HOSPITAL ENCOUNTER (INPATIENT)
Facility: HOSPITAL | Age: 79
LOS: 4 days | Discharge: HOME HEALTH | DRG: 641 | End: 2024-08-06
Attending: EMERGENCY MEDICINE | Admitting: HOSPITALIST
Payer: MEDICARE

## 2024-08-01 DIAGNOSIS — N18.9 CHRONIC KIDNEY DISEASE, UNSPECIFIED CKD STAGE: ICD-10-CM

## 2024-08-01 DIAGNOSIS — E87.5 HYPERKALEMIA: Primary | ICD-10-CM

## 2024-08-01 DIAGNOSIS — D64.9 ANEMIA, UNSPECIFIED TYPE: ICD-10-CM

## 2024-08-01 LAB
ANION GAP SERPL CALC-SCNC: 4 MMOL/L (ref 5–15)
BASOPHILS # BLD: 0.1 K/UL (ref 0–0.1)
BASOPHILS NFR BLD: 1 % (ref 0–1)
BUN SERPL-MCNC: 38 MG/DL (ref 6–20)
BUN/CREAT SERPL: 25 (ref 12–20)
CALCIUM SERPL-MCNC: 8.9 MG/DL (ref 8.5–10.1)
CHLORIDE SERPL-SCNC: 104 MMOL/L (ref 97–108)
CO2 SERPL-SCNC: 23 MMOL/L (ref 21–32)
CREAT SERPL-MCNC: 1.51 MG/DL (ref 0.55–1.02)
DIFFERENTIAL METHOD BLD: ABNORMAL
EOSINOPHIL # BLD: 0.2 K/UL (ref 0–0.4)
EOSINOPHIL NFR BLD: 3 % (ref 0–7)
ERYTHROCYTE [DISTWIDTH] IN BLOOD BY AUTOMATED COUNT: 16.4 % (ref 11.5–14.5)
GLUCOSE SERPL-MCNC: 70 MG/DL (ref 65–100)
HCT VFR BLD AUTO: 25.7 % (ref 35–47)
HGB BLD-MCNC: 8.4 G/DL (ref 11.5–16)
IMM GRANULOCYTES # BLD AUTO: 0 K/UL (ref 0–0.04)
IMM GRANULOCYTES NFR BLD AUTO: 0 % (ref 0–0.5)
LYMPHOCYTES # BLD: 2.5 K/UL (ref 0.8–3.5)
LYMPHOCYTES NFR BLD: 36 % (ref 12–49)
MAGNESIUM SERPL-MCNC: 1.8 MG/DL (ref 1.6–2.4)
MCH RBC QN AUTO: 29.7 PG (ref 26–34)
MCHC RBC AUTO-ENTMCNC: 32.7 G/DL (ref 30–36.5)
MCV RBC AUTO: 90.8 FL (ref 80–99)
MONOCYTES # BLD: 0.5 K/UL (ref 0–1)
MONOCYTES NFR BLD: 7 % (ref 5–13)
NEUTS SEG # BLD: 3.7 K/UL (ref 1.8–8)
NEUTS SEG NFR BLD: 53 % (ref 32–75)
NRBC # BLD: 0 K/UL (ref 0–0.01)
NRBC BLD-RTO: 0 PER 100 WBC
PLATELET # BLD AUTO: 234 K/UL (ref 150–400)
PMV BLD AUTO: 11.8 FL (ref 8.9–12.9)
POTASSIUM SERPL-SCNC: 6.3 MMOL/L (ref 3.5–5.1)
RBC # BLD AUTO: 2.83 M/UL (ref 3.8–5.2)
SODIUM SERPL-SCNC: 131 MMOL/L (ref 136–145)
WBC # BLD AUTO: 6.9 K/UL (ref 3.6–11)

## 2024-08-01 PROCEDURE — 96375 TX/PRO/DX INJ NEW DRUG ADDON: CPT

## 2024-08-01 PROCEDURE — 99285 EMERGENCY DEPT VISIT HI MDM: CPT

## 2024-08-01 PROCEDURE — 6360000002 HC RX W HCPCS: Performed by: EMERGENCY MEDICINE

## 2024-08-01 PROCEDURE — 83735 ASSAY OF MAGNESIUM: CPT

## 2024-08-01 PROCEDURE — 96374 THER/PROPH/DIAG INJ IV PUSH: CPT

## 2024-08-01 PROCEDURE — 2580000003 HC RX 258: Performed by: STUDENT IN AN ORGANIZED HEALTH CARE EDUCATION/TRAINING PROGRAM

## 2024-08-01 PROCEDURE — 99291 CRITICAL CARE FIRST HOUR: CPT

## 2024-08-01 PROCEDURE — 6370000000 HC RX 637 (ALT 250 FOR IP): Performed by: EMERGENCY MEDICINE

## 2024-08-01 PROCEDURE — 85025 COMPLETE CBC W/AUTO DIFF WBC: CPT

## 2024-08-01 PROCEDURE — 94640 AIRWAY INHALATION TREATMENT: CPT

## 2024-08-01 PROCEDURE — 2580000003 HC RX 258: Performed by: EMERGENCY MEDICINE

## 2024-08-01 PROCEDURE — 2500000003 HC RX 250 WO HCPCS: Performed by: EMERGENCY MEDICINE

## 2024-08-01 PROCEDURE — 80048 BASIC METABOLIC PNL TOTAL CA: CPT

## 2024-08-01 PROCEDURE — 96361 HYDRATE IV INFUSION ADD-ON: CPT

## 2024-08-01 PROCEDURE — 36415 COLL VENOUS BLD VENIPUNCTURE: CPT

## 2024-08-01 RX ORDER — ALBUTEROL SULFATE 2.5 MG/3ML
10 SOLUTION RESPIRATORY (INHALATION) ONCE
Status: COMPLETED | OUTPATIENT
Start: 2024-08-01 | End: 2024-08-01

## 2024-08-01 RX ORDER — DEXTROSE MONOHYDRATE 100 MG/ML
INJECTION, SOLUTION INTRAVENOUS CONTINUOUS PRN
Status: DISCONTINUED | OUTPATIENT
Start: 2024-08-01 | End: 2024-08-06 | Stop reason: HOSPADM

## 2024-08-01 RX ORDER — CALCIUM GLUCONATE 20 MG/ML
1000 INJECTION, SOLUTION INTRAVENOUS ONCE
Status: COMPLETED | OUTPATIENT
Start: 2024-08-01 | End: 2024-08-01

## 2024-08-01 RX ORDER — GLUCAGON 1 MG/ML
1 KIT INJECTION PRN
Status: DISCONTINUED | OUTPATIENT
Start: 2024-08-01 | End: 2024-08-06 | Stop reason: HOSPADM

## 2024-08-01 RX ORDER — SODIUM CHLORIDE, SODIUM LACTATE, POTASSIUM CHLORIDE, AND CALCIUM CHLORIDE .6; .31; .03; .02 G/100ML; G/100ML; G/100ML; G/100ML
1000 INJECTION, SOLUTION INTRAVENOUS
Status: COMPLETED | OUTPATIENT
Start: 2024-08-01 | End: 2024-08-01

## 2024-08-01 RX ORDER — SODIUM CHLORIDE, SODIUM LACTATE, POTASSIUM CHLORIDE, AND CALCIUM CHLORIDE .6; .31; .03; .02 G/100ML; G/100ML; G/100ML; G/100ML
1000 INJECTION, SOLUTION INTRAVENOUS
Status: COMPLETED | OUTPATIENT
Start: 2024-08-01 | End: 2024-08-02

## 2024-08-01 RX ADMIN — DEXTROSE MONOHYDRATE 250 ML: 100 INJECTION, SOLUTION INTRAVENOUS at 20:22

## 2024-08-01 RX ADMIN — SODIUM CHLORIDE, POTASSIUM CHLORIDE, SODIUM LACTATE AND CALCIUM CHLORIDE 1000 ML: 600; 310; 30; 20 INJECTION, SOLUTION INTRAVENOUS at 23:58

## 2024-08-01 RX ADMIN — SODIUM CHLORIDE, POTASSIUM CHLORIDE, SODIUM LACTATE AND CALCIUM CHLORIDE 1000 ML: 600; 310; 30; 20 INJECTION, SOLUTION INTRAVENOUS at 20:54

## 2024-08-01 RX ADMIN — INSULIN HUMAN 10 UNITS: 100 INJECTION, SOLUTION PARENTERAL at 20:43

## 2024-08-01 RX ADMIN — SODIUM BICARBONATE 50 MEQ: 84 INJECTION, SOLUTION INTRAVENOUS at 20:47

## 2024-08-01 RX ADMIN — CALCIUM GLUCONATE 1000 MG: 20 INJECTION, SOLUTION INTRAVENOUS at 20:06

## 2024-08-01 RX ADMIN — ALBUTEROL SULFATE 10 MG: 2.5 SOLUTION RESPIRATORY (INHALATION) at 20:09

## 2024-08-01 RX ADMIN — SODIUM ZIRCONIUM CYCLOSILICATE 10 G: 10 POWDER, FOR SUSPENSION ORAL at 21:24

## 2024-08-01 ASSESSMENT — PAIN SCALES - GENERAL: PAINLEVEL_OUTOF10: 0

## 2024-08-01 ASSESSMENT — LIFESTYLE VARIABLES
HOW OFTEN DO YOU HAVE A DRINK CONTAINING ALCOHOL: NEVER
HOW MANY STANDARD DRINKS CONTAINING ALCOHOL DO YOU HAVE ON A TYPICAL DAY: PATIENT DOES NOT DRINK

## 2024-08-01 NOTE — ED PROVIDER NOTES
Vitals:    08/01/24 1743 08/01/24 1744 08/01/24 1805 08/01/24 1807   BP: (!) 81/66 (!) 134/52 (!) 118/50    Pulse: 59 58     Resp: 16      Temp: 97.2 °F (36.2 °C)      TempSrc: Oral      SpO2: 100% 100%  100%   Weight: 55.8 kg (123 lb 0.3 oz)      Height: 1.524 m (5')           Patient was given the following medications:  Medications   glucose chewable tablet 16 g (has no administration in time range)   dextrose bolus 10% 125 mL (has no administration in time range)     Or   dextrose bolus 10% 250 mL (has no administration in time range)   glucagon injection 1 mg (has no administration in time range)   dextrose 10 % infusion (has no administration in time range)   sodium zirconium cyclosilicate (LOKELMA) oral suspension 10 g (has no administration in time range)   lactated ringers bolus 1,000 mL (has no administration in time range)   calcium gluconate 1,000 mg in sodium chloride 50 mL ( IntraVENous Stopped 8/1/24 2016)   insulin regular (HUMULIN R;NOVOLIN R) injection 10 Units (10 Units IntraVENous Given 8/1/24 2043)     And   dextrose bolus 10% 250 mL ( IntraVENous Stopped 8/1/24 2041)   albuterol (PROVENTIL) (2.5 MG/3ML) 0.083% nebulizer solution 10 mg (10 mg Nebulization Given 8/1/24 2009)   sodium bicarbonate 8.4 % injection 50 mEq (50 mEq IntraVENous Given 8/1/24 2047)       Medical Decision Making  79-year-old female presents emergency department with a chief complaint of abnormal labs.  Vital signs are unremarkable, initial triage BP hypotensive, suspect erroneous measurement as this resolved without intervention.    Reviewed PCP note and outpatient labs showing hyperkalemia.  Twelve-lead EKG here does not show any obvious T wave changes, consider hemolysis, will check potassium here prior to treating.    Observe in emergency department.    Amount and/or Complexity of Data Reviewed  Independent Historian: caregiver  External Data Reviewed: labs and notes.     Details: Outpatient labs, [facility] note  Labs:

## 2024-08-01 NOTE — TELEPHONE ENCOUNTER
Patient wants a return call regarding the medication sodium zirconium cyclosilicate (LOKELMA) 5 g PACK oral suspension , she said the Ins will not cover it.  Please give her a call @ [phone]

## 2024-08-01 NOTE — TELEPHONE ENCOUNTER
----- Message from Jennifer Lane Wes sent at 8/1/2024  3:17 PM EDT -----  Regarding: ECC Message to Provider  ECC Message to Provider    Relationship to Patient: Self     Additional Information Patient is requesting for her PCP to call her back regarding her medication  --------------------------------------------------------------------------------------------------------------------------    Call Back Information: Do not leave any message, patient will call back for answer  Preferred Call Back Number: Phone [phone]

## 2024-08-02 ENCOUNTER — APPOINTMENT (OUTPATIENT)
Facility: HOSPITAL | Age: 79
DRG: 641 | End: 2024-08-02
Payer: MEDICARE

## 2024-08-02 ENCOUNTER — TELEPHONE (OUTPATIENT)
Age: 79
End: 2024-08-02

## 2024-08-02 PROBLEM — R57.9 SHOCK (HCC): Status: ACTIVE | Noted: 2024-08-02

## 2024-08-02 LAB
ANION GAP BLD CALC-SCNC: 11 (ref 10–20)
ANION GAP SERPL CALC-SCNC: 7 MMOL/L (ref 5–15)
ANION GAP SERPL CALC-SCNC: 8 MMOL/L (ref 5–15)
BASE DEFICIT BLD-SCNC: 3.4 MMOL/L
BUN SERPL-MCNC: 30 MG/DL (ref 6–20)
BUN SERPL-MCNC: 33 MG/DL (ref 6–20)
BUN/CREAT SERPL: 23 (ref 12–20)
BUN/CREAT SERPL: 24 (ref 12–20)
CA-I BLD-MCNC: 1.3 MMOL/L (ref 1.12–1.32)
CALCIUM SERPL-MCNC: 8.5 MG/DL (ref 8.5–10.1)
CALCIUM SERPL-MCNC: 8.8 MG/DL (ref 8.5–10.1)
CHLORIDE BLD-SCNC: 101 MMOL/L (ref 100–108)
CHLORIDE SERPL-SCNC: 104 MMOL/L (ref 97–108)
CHLORIDE SERPL-SCNC: 109 MMOL/L (ref 97–108)
CO2 BLD-SCNC: 24 MMOL/L (ref 19–24)
CO2 SERPL-SCNC: 23 MMOL/L (ref 21–32)
CO2 SERPL-SCNC: 24 MMOL/L (ref 21–32)
CORTIS SERPL-MCNC: 20.2 UG/DL
CORTIS SERPL-MCNC: 26.3 UG/DL
CORTIS SERPL-MCNC: 7.3 UG/DL
CREAT SERPL-MCNC: 1.31 MG/DL (ref 0.55–1.02)
CREAT SERPL-MCNC: 1.38 MG/DL (ref 0.55–1.02)
CREAT UR-MCNC: 1.2 MG/DL (ref 0.6–1.3)
ERYTHROCYTE [DISTWIDTH] IN BLOOD BY AUTOMATED COUNT: 16.3 % (ref 11.5–14.5)
GLUCOSE BLD STRIP.AUTO-MCNC: 132 MG/DL (ref 74–99)
GLUCOSE SERPL-MCNC: 128 MG/DL (ref 65–100)
GLUCOSE SERPL-MCNC: 149 MG/DL (ref 65–100)
HCO3 BLDA-SCNC: 24 MMOL/L
HCT VFR BLD AUTO: 21.5 % (ref 35–47)
HGB BLD-MCNC: 7 G/DL (ref 11.5–16)
LACTATE BLD-SCNC: 5.4 MMOL/L (ref 0.4–2)
LACTATE SERPL-SCNC: 3.7 MMOL/L (ref 0.4–2)
LACTATE SERPL-SCNC: 4.4 MMOL/L (ref 0.4–2)
MAGNESIUM SERPL-MCNC: 1.7 MG/DL (ref 1.6–2.4)
MCH RBC QN AUTO: 29.3 PG (ref 26–34)
MCHC RBC AUTO-ENTMCNC: 32.6 G/DL (ref 30–36.5)
MCV RBC AUTO: 90 FL (ref 80–99)
NRBC # BLD: 0 K/UL (ref 0–0.01)
NRBC BLD-RTO: 0 PER 100 WBC
NT PRO BNP: 46 PG/ML
PCO2 BLDV: 52.5 MMHG (ref 41–51)
PH BLDV: 7.26 (ref 7.32–7.42)
PHOSPHATE SERPL-MCNC: 3.1 MG/DL (ref 2.6–4.7)
PLATELET # BLD AUTO: 199 K/UL (ref 150–400)
PMV BLD AUTO: 11.1 FL (ref 8.9–12.9)
PO2 BLDV: 31 MMHG (ref 25–40)
POTASSIUM BLD-SCNC: 3.7 MMOL/L (ref 3.5–5.5)
POTASSIUM SERPL-SCNC: 3.6 MMOL/L (ref 3.5–5.1)
POTASSIUM SERPL-SCNC: 4 MMOL/L (ref 3.5–5.1)
PROCALCITONIN SERPL-MCNC: <0.05 NG/ML
RBC # BLD AUTO: 2.39 M/UL (ref 3.8–5.2)
SAO2 % BLD: 50 %
SERVICE CMNT-IMP: ABNORMAL
SODIUM BLD-SCNC: 136 MMOL/L (ref 136–145)
SODIUM SERPL-SCNC: 136 MMOL/L (ref 136–145)
SODIUM SERPL-SCNC: 139 MMOL/L (ref 136–145)
SPECIMEN SITE: ABNORMAL
T3FREE SERPL-MCNC: 1.7 PG/ML (ref 2.2–4)
T4 FREE SERPL-MCNC: 1 NG/DL (ref 0.8–1.5)
TSH SERPL DL<=0.05 MIU/L-ACNC: 1.98 UIU/ML (ref 0.36–3.74)
WBC # BLD AUTO: 6.5 K/UL (ref 3.6–11)

## 2024-08-02 PROCEDURE — 3E033XZ INTRODUCTION OF VASOPRESSOR INTO PERIPHERAL VEIN, PERCUTANEOUS APPROACH: ICD-10-PCS | Performed by: INTERNAL MEDICINE

## 2024-08-02 PROCEDURE — 6360000002 HC RX W HCPCS: Performed by: STUDENT IN AN ORGANIZED HEALTH CARE EDUCATION/TRAINING PROGRAM

## 2024-08-02 PROCEDURE — 87040 BLOOD CULTURE FOR BACTERIA: CPT

## 2024-08-02 PROCEDURE — 84295 ASSAY OF SERUM SODIUM: CPT

## 2024-08-02 PROCEDURE — 2580000003 HC RX 258: Performed by: STUDENT IN AN ORGANIZED HEALTH CARE EDUCATION/TRAINING PROGRAM

## 2024-08-02 PROCEDURE — 80048 BASIC METABOLIC PNL TOTAL CA: CPT

## 2024-08-02 PROCEDURE — 83880 ASSAY OF NATRIURETIC PEPTIDE: CPT

## 2024-08-02 PROCEDURE — 82533 TOTAL CORTISOL: CPT

## 2024-08-02 PROCEDURE — 6360000002 HC RX W HCPCS: Performed by: NURSE PRACTITIONER

## 2024-08-02 PROCEDURE — 2580000003 HC RX 258: Performed by: NURSE PRACTITIONER

## 2024-08-02 PROCEDURE — 74174 CTA ABD&PLVS W/CONTRAST: CPT

## 2024-08-02 PROCEDURE — 82947 ASSAY GLUCOSE BLOOD QUANT: CPT

## 2024-08-02 PROCEDURE — 84443 ASSAY THYROID STIM HORMONE: CPT

## 2024-08-02 PROCEDURE — 96375 TX/PRO/DX INJ NEW DRUG ADDON: CPT

## 2024-08-02 PROCEDURE — 84145 PROCALCITONIN (PCT): CPT

## 2024-08-02 PROCEDURE — 6360000002 HC RX W HCPCS: Performed by: HOSPITALIST

## 2024-08-02 PROCEDURE — 83605 ASSAY OF LACTIC ACID: CPT

## 2024-08-02 PROCEDURE — 6370000000 HC RX 637 (ALT 250 FOR IP): Performed by: HOSPITALIST

## 2024-08-02 PROCEDURE — 36415 COLL VENOUS BLD VENIPUNCTURE: CPT

## 2024-08-02 PROCEDURE — 85027 COMPLETE CBC AUTOMATED: CPT

## 2024-08-02 PROCEDURE — 83735 ASSAY OF MAGNESIUM: CPT

## 2024-08-02 PROCEDURE — 84132 ASSAY OF SERUM POTASSIUM: CPT

## 2024-08-02 PROCEDURE — 2000000000 HC ICU R&B

## 2024-08-02 PROCEDURE — 82330 ASSAY OF CALCIUM: CPT

## 2024-08-02 PROCEDURE — 2500000003 HC RX 250 WO HCPCS: Performed by: STUDENT IN AN ORGANIZED HEALTH CARE EDUCATION/TRAINING PROGRAM

## 2024-08-02 PROCEDURE — 82803 BLOOD GASES ANY COMBINATION: CPT

## 2024-08-02 PROCEDURE — 84100 ASSAY OF PHOSPHORUS: CPT

## 2024-08-02 PROCEDURE — 84439 ASSAY OF FREE THYROXINE: CPT

## 2024-08-02 PROCEDURE — 6360000004 HC RX CONTRAST MEDICATION: Performed by: RADIOLOGY

## 2024-08-02 PROCEDURE — 84481 FREE ASSAY (FT-3): CPT

## 2024-08-02 RX ORDER — ACETAMINOPHEN 325 MG/1
650 TABLET ORAL EVERY 6 HOURS PRN
Status: DISCONTINUED | OUTPATIENT
Start: 2024-08-02 | End: 2024-08-06 | Stop reason: HOSPADM

## 2024-08-02 RX ORDER — SODIUM CHLORIDE 9 MG/ML
INJECTION, SOLUTION INTRAVENOUS PRN
Status: DISCONTINUED | OUTPATIENT
Start: 2024-08-02 | End: 2024-08-06 | Stop reason: HOSPADM

## 2024-08-02 RX ORDER — SODIUM CHLORIDE 0.9 % (FLUSH) 0.9 %
5-40 SYRINGE (ML) INJECTION PRN
Status: DISCONTINUED | OUTPATIENT
Start: 2024-08-02 | End: 2024-08-02 | Stop reason: SDUPTHER

## 2024-08-02 RX ORDER — ENOXAPARIN SODIUM 100 MG/ML
30 INJECTION SUBCUTANEOUS DAILY
Status: DISCONTINUED | OUTPATIENT
Start: 2024-08-02 | End: 2024-08-06 | Stop reason: HOSPADM

## 2024-08-02 RX ORDER — SODIUM CHLORIDE, SODIUM LACTATE, POTASSIUM CHLORIDE, AND CALCIUM CHLORIDE .6; .31; .03; .02 G/100ML; G/100ML; G/100ML; G/100ML
1000 INJECTION, SOLUTION INTRAVENOUS ONCE
Status: COMPLETED | OUTPATIENT
Start: 2024-08-02 | End: 2024-08-02

## 2024-08-02 RX ORDER — ONDANSETRON 4 MG/1
4 TABLET, ORALLY DISINTEGRATING ORAL EVERY 8 HOURS PRN
Status: DISCONTINUED | OUTPATIENT
Start: 2024-08-02 | End: 2024-08-06 | Stop reason: HOSPADM

## 2024-08-02 RX ORDER — DEXAMETHASONE SODIUM PHOSPHATE 10 MG/ML
10 INJECTION, SOLUTION INTRAMUSCULAR; INTRAVENOUS ONCE
Status: COMPLETED | OUTPATIENT
Start: 2024-08-02 | End: 2024-08-02

## 2024-08-02 RX ORDER — 0.9 % SODIUM CHLORIDE 0.9 %
30 INTRAVENOUS SOLUTION INTRAVENOUS ONCE
Status: COMPLETED | OUTPATIENT
Start: 2024-08-02 | End: 2024-08-02

## 2024-08-02 RX ORDER — SODIUM CHLORIDE 0.9 % (FLUSH) 0.9 %
5-40 SYRINGE (ML) INJECTION EVERY 12 HOURS SCHEDULED
Status: DISCONTINUED | OUTPATIENT
Start: 2024-08-02 | End: 2024-08-06 | Stop reason: HOSPADM

## 2024-08-02 RX ORDER — ACETAMINOPHEN 650 MG/1
650 SUPPOSITORY RECTAL EVERY 6 HOURS PRN
Status: DISCONTINUED | OUTPATIENT
Start: 2024-08-02 | End: 2024-08-06 | Stop reason: HOSPADM

## 2024-08-02 RX ORDER — ONDANSETRON 2 MG/ML
4 INJECTION INTRAMUSCULAR; INTRAVENOUS EVERY 6 HOURS PRN
Status: DISCONTINUED | OUTPATIENT
Start: 2024-08-02 | End: 2024-08-06 | Stop reason: HOSPADM

## 2024-08-02 RX ORDER — SODIUM CHLORIDE 0.9 % (FLUSH) 0.9 %
5-40 SYRINGE (ML) INJECTION EVERY 12 HOURS SCHEDULED
Status: DISCONTINUED | OUTPATIENT
Start: 2024-08-02 | End: 2024-08-02 | Stop reason: SDUPTHER

## 2024-08-02 RX ORDER — NOREPINEPHRINE BITARTRATE 0.06 MG/ML
.5-2 INJECTION, SOLUTION INTRAVENOUS CONTINUOUS
Status: DISCONTINUED | OUTPATIENT
Start: 2024-08-02 | End: 2024-08-02

## 2024-08-02 RX ORDER — NOREPINEPHRINE BITARTRATE 0.06 MG/ML
.5-2 INJECTION, SOLUTION INTRAVENOUS CONTINUOUS
Status: ACTIVE | OUTPATIENT
Start: 2024-08-02 | End: 2024-08-03

## 2024-08-02 RX ORDER — MIDODRINE HYDROCHLORIDE 5 MG/1
5 TABLET ORAL
Status: DISCONTINUED | OUTPATIENT
Start: 2024-08-02 | End: 2024-08-03

## 2024-08-02 RX ORDER — COSYNTROPIN 0.25 MG/ML
250 INJECTION, POWDER, FOR SOLUTION INTRAMUSCULAR; INTRAVENOUS ONCE
Status: COMPLETED | OUTPATIENT
Start: 2024-08-02 | End: 2024-08-02

## 2024-08-02 RX ORDER — SODIUM CHLORIDE 0.9 % (FLUSH) 0.9 %
5-40 SYRINGE (ML) INJECTION PRN
Status: DISCONTINUED | OUTPATIENT
Start: 2024-08-02 | End: 2024-08-06 | Stop reason: HOSPADM

## 2024-08-02 RX ORDER — POLYETHYLENE GLYCOL 3350 17 G/17G
17 POWDER, FOR SOLUTION ORAL DAILY PRN
Status: DISCONTINUED | OUTPATIENT
Start: 2024-08-02 | End: 2024-08-06 | Stop reason: HOSPADM

## 2024-08-02 RX ADMIN — SODIUM CHLORIDE 1674 ML: 9 INJECTION, SOLUTION INTRAVENOUS at 01:35

## 2024-08-02 RX ADMIN — HYDROCORTISONE SODIUM SUCCINATE 100 MG: 100 INJECTION, POWDER, FOR SOLUTION INTRAMUSCULAR; INTRAVENOUS at 17:31

## 2024-08-02 RX ADMIN — ENOXAPARIN SODIUM 30 MG: 100 INJECTION SUBCUTANEOUS at 08:57

## 2024-08-02 RX ADMIN — SODIUM CHLORIDE 4 MCG/MIN: 9 INJECTION, SOLUTION INTRAVENOUS at 01:17

## 2024-08-02 RX ADMIN — MIDODRINE HYDROCHLORIDE 5 MG: 5 TABLET ORAL at 17:31

## 2024-08-02 RX ADMIN — HYDROCORTISONE SODIUM SUCCINATE 50 MG: 100 INJECTION, POWDER, FOR SOLUTION INTRAMUSCULAR; INTRAVENOUS at 04:27

## 2024-08-02 RX ADMIN — DEXAMETHASONE SODIUM PHOSPHATE 10 MG: 10 INJECTION, SOLUTION INTRAMUSCULAR; INTRAVENOUS at 01:50

## 2024-08-02 RX ADMIN — MIDODRINE HYDROCHLORIDE 5 MG: 5 TABLET ORAL at 11:38

## 2024-08-02 RX ADMIN — PIPERACILLIN AND TAZOBACTAM 4500 MG: 4; .5 INJECTION, POWDER, LYOPHILIZED, FOR SOLUTION INTRAVENOUS at 01:53

## 2024-08-02 RX ADMIN — SODIUM CHLORIDE, PRESERVATIVE FREE 10 ML: 5 INJECTION INTRAVENOUS at 19:57

## 2024-08-02 RX ADMIN — COSYNTROPIN 250 MCG: 0.25 INJECTION, POWDER, LYOPHILIZED, FOR SOLUTION INTRAMUSCULAR; INTRAVENOUS at 02:10

## 2024-08-02 RX ADMIN — PIPERACILLIN AND TAZOBACTAM 3375 MG: 3; .375 INJECTION, POWDER, FOR SOLUTION INTRAVENOUS; PARENTERAL at 22:36

## 2024-08-02 RX ADMIN — IOPAMIDOL 100 ML: 755 INJECTION, SOLUTION INTRAVENOUS at 00:38

## 2024-08-02 RX ADMIN — PIPERACILLIN AND TAZOBACTAM 3375 MG: 3; .375 INJECTION, POWDER, FOR SOLUTION INTRAVENOUS; PARENTERAL at 15:25

## 2024-08-02 RX ADMIN — PIPERACILLIN AND TAZOBACTAM 3375 MG: 3; .375 INJECTION, POWDER, FOR SOLUTION INTRAVENOUS; PARENTERAL at 07:40

## 2024-08-02 RX ADMIN — HYDROCORTISONE SODIUM SUCCINATE 50 MG: 100 INJECTION, POWDER, FOR SOLUTION INTRAMUSCULAR; INTRAVENOUS at 08:57

## 2024-08-02 RX ADMIN — SODIUM CHLORIDE, POTASSIUM CHLORIDE, SODIUM LACTATE AND CALCIUM CHLORIDE 1000 ML: 600; 310; 30; 20 INJECTION, SOLUTION INTRAVENOUS at 04:31

## 2024-08-02 RX ADMIN — SODIUM CHLORIDE 1500 MG: 9 INJECTION, SOLUTION INTRAVENOUS at 02:43

## 2024-08-02 ASSESSMENT — PAIN SCALES - GENERAL
PAINLEVEL_OUTOF10: 0
PAINLEVEL_OUTOF10: 0

## 2024-08-02 NOTE — INTERDISCIPLINARY ROUNDS
Critical care interdisciplinary rounds today.  Following members present: Case Management, , Nursing, Nutrition, Pharmacy, and Physician. Remains on Levophed.   Plan of care discussed.  See clinical pathway for plan of care and interventions and desired outcomes.

## 2024-08-02 NOTE — CONSULTS
Hospitalist Consultation Note    NAME:  Zeenat Rolle   :   1945   MRN:   [id number]     ATTENDING: Jose Angel Rodriguez MD  PCP:  Lefty Segura MD    Date/Time:  2024 2:12 AM      Recommendations/Plan:       ASSESSMENT:    Undifferentiated shock  Lactic acidosis secondary to above  Hypothermia  Hyperkalemia  Mild hyponatremia  Chronic normocytic anemia  CKD 3, baseline  Hyperlipidemia    RECOMMENDATIONS:    Sepsis bundle with 30 cc/kg IV fluid resuscitation  Start norepinephrine given fluid unresponsiveness  10 mg IV Decadron  Adrenal insufficiency testing  Stat Accu-Chek, VBG with point-of-care lactic, BMP, CBC  Contacted intensivist service for admission to ICU  Stat CTA chest abdomen pelvis    I was initially contacted by emergency department physician for admission and further evaluation of hyperkalemia.  Patient had received temporizing measures as well as potassium lowering intervention (Lokelma).  Due to high admission volumes and higher acuity admissions there was a delay in presenting to bedside for evaluation.  During this time.  Nursing reported interval development of hypotension at which time I went to evaluate patient.  She reported feeling lightheaded, but was otherwise asymptomatic.  Confirmed hypotension on multiple blood pressure reads with 30 points systolic discordance between left and right arm prompting stat CTA chest abdomen pelvis.  Stat labs, volume resuscitation, sepsis bundle, and pressors were ordered by myself.  Given persistent hypotension requiring pressor agents I reached out to intensivist service for admission to the ICU.      Code Status: Full            Subjective:   REQUESTING PHYSICIAN/ SERVICE: Emergency department    HPI/ REASON FOR CONSULT:      Zeenat is a 79 y.o. female who I was asked to see in consultation for consideration of admission for hyperkalemia.  Patient reports she was incidentally found to have hyperkalemia to 6.1 on outpatient testing.  PCP had

## 2024-08-02 NOTE — CONSULTS
CRITICAL CARE NOTE    Name: Zeenat Rolle   : 1945   MRN: [id number]   Date: 2024      REASON FOR ICU ADMISSION:  Shock     PRINCIPAL ICU DIAGNOSIS     Shock, undifferentiated  Hyperkalemia  CKD 3, POA  Hypothermia    BRIEF PATIENT SUMMARY     Zeenat Rolle is an 80 yo female with a PMH of GERD, HLD, hyperparathyroidism, vertebral artery stenosis, CKD3 and L SC artery stenosis who presented to the ED at [facility] on  with hyperkalemia (no associated CA on CKD).  She had been seen in clinic for syncope and was found to have hyperkalemia.  She was prescribed lowkelma,  but was unable to pick this up at pharmacy due to insurance issues.  In the ED, she demonstrated hypotension and received 3L IVF.  Following this, she developed hypothermia.  She remained hypotensive and levophed was started. LA 5. She was initially admitted to the hospitalist service where vanc/zosyn were started.  However, due to ongoing pressor requirement, she was transferred to the ICU.    Medication list reviewed and none noted that may cause type 4 RTA.  No recent steroids.  Decadron 10mg IV administered in ED.  Shorty stim ordered.  Thyroid studies ordered. Procal low.     She had recently been discharged from [facility] on  for the same.     COMPREHENSIVE ASSESSMENT & PLAN:SYSTEM BASED     24 HOUR EVENTS: As above    NEUROLOGICAL:     No acute issues  Oriented x3    PULMONOLOGY:     On RA    CARDIOVASCULAR:     Shock, undifferentiated: Elevated LA likely due to hypoperfusion rather than infection. Lack of response to 3L IVF led to initiation of vasopressors.  Given hyperkalemia without associated CA on CKD (and similar previous presentation), high concern for AI.    - Shorty stim  - Empiric stress dose steroids (ordered after shorty stim)  - Thyroid studies ordered  - Blood cultures  - Empiric vanc/zosyn  - Infectious workup ongoing: CTA pending, urinalysis pending  - Trend LA; decreasing    24 3:31 AM EDT I have completed a sepsis

## 2024-08-02 NOTE — CONSULTS
Consultation Note    NAME: Zeenat Rolle   :  1945   MRN:  [id number]     Date/Time:  2024 3:12 PM    I have been asked to see this patient by Dr. Rodriguez  for advice/opinion re: recurrent hyperkalemia/CKD stage 3.     Assessment :    Plan:  CKD stage 3  Recurrent hyperkalemia  Anemia - recently referred [facility]  Recent syncope  hypotension K now 4 (peaked at 6.3 ) - s/p IVF bolus, albuterol, calcium, insulin/glucose, bicarb, Lokelma; I would avoid LR as crystalloid for now; continue with lokelma qod for now    Creatinine ~ 1.3; bland urine early ; CTA abd/pelvis - no hydro    Elevated lactic acid    On midodrine and Levo     new patient 30, 07-  hyperkalemia, Dr. Segura, ok per [facility]..ket  Performed by Vani Garcia MD, Nephrology, [phone]  acute kidney injury  likely 2nd NSAID;better creat  Check urinalysis, dipstick  Check comp. metabolic panel [id number]  Check PE+interp(rfx tico),S  Check free K+L lt chains,qn,S  Check CBC with differential/platelet  Check prot+creatu (random)  Order US, kidney  chronic kidney disease stage 3A  baseline creat 1.2-1.3  hyperkalemia  resolved  anemia  Check fe+TIBC+[id number]    Subjective:   CHIEF COMPLAINT:  hyperkalemia    HISTORY OF PRESENT ILLNESS:     Zeenat is a 79 y.o.   female who has a history of the below. Came with low BP and high K. She is now in the icu on levo. Denies complaint. Outpatient and inpatient charts reviewed. No n/v/d. No currently lh/dz.    Past Medical History:   Diagnosis Date    Achalasia     GERD (gastroesophageal reflux disease)     Hypercholesterolemia     Hyperparathyroidism (HCC)     Stenosis of left subclavian artery (HCC)     Vertebral artery stenosis       Past Surgical History:   Procedure Laterality Date    COLONOSCOPY  2006    tierra- nl- but had hx polyp in     COLONOSCOPY N/A 2016    COLONOSCOPY performed by Drake Powell MD at [facility] ENDOSCOPY    COLONOSCOPY FLX DX W/COLLJ SPEC

## 2024-08-02 NOTE — CONSULTS
I reviewed her cart today and looking at her Cosyntropin stim test which came back as > 18, this would rule out adrenal insufficiency. I agree with the stress dose steroids of Hydrocortisone 100mg IV every 8 hours, but her biochemical work-up would argue against adrenal insufficiency.

## 2024-08-02 NOTE — TELEPHONE ENCOUNTER
Attempted to reach Dulce Maria unfortunately all the prompts went to same thing to push one to reach prize.  After three times pushing no to prize and only getting same prompt  call was disconnected.

## 2024-08-02 NOTE — TELEPHONE ENCOUNTER
Dulce Maria ( nivio ) with [facility] would like to get a call from nurse regarding   sodium zirconium cyclosilicate (LOKELMA) 5 g PACK oral suspension she can be reached [phone] ref # [id number]

## 2024-08-02 NOTE — ED NOTES
Assumed care of patient at this time, patient is alert and oriented and able to follow commands, Ruben DO updated with most recent blood pressure.

## 2024-08-03 LAB
ANION GAP SERPL CALC-SCNC: 7 MMOL/L (ref 5–15)
APPEARANCE UR: CLEAR
BACTERIA SPEC CULT: NORMAL
BACTERIA SPEC CULT: NORMAL
BACTERIA URNS QL MICRO: NEGATIVE /HPF
BILIRUB UR QL: NEGATIVE
BUN SERPL-MCNC: 22 MG/DL (ref 6–20)
BUN/CREAT SERPL: 15 (ref 12–20)
CALCIUM SERPL-MCNC: 8.6 MG/DL (ref 8.5–10.1)
CHLORIDE SERPL-SCNC: 112 MMOL/L (ref 97–108)
CO2 SERPL-SCNC: 23 MMOL/L (ref 21–32)
COLOR UR: NORMAL
CREAT SERPL-MCNC: 1.42 MG/DL (ref 0.55–1.02)
EPITH CASTS URNS QL MICRO: NORMAL /LPF
ERYTHROCYTE [DISTWIDTH] IN BLOOD BY AUTOMATED COUNT: 16.9 % (ref 11.5–14.5)
GLUCOSE SERPL-MCNC: 127 MG/DL (ref 65–100)
GLUCOSE UR STRIP.AUTO-MCNC: NEGATIVE MG/DL
HCT VFR BLD AUTO: 21 % (ref 35–47)
HGB BLD-MCNC: 7.1 G/DL (ref 11.5–16)
HGB UR QL STRIP: NEGATIVE
KETONES UR QL STRIP.AUTO: NEGATIVE MG/DL
LEUKOCYTE ESTERASE UR QL STRIP.AUTO: NEGATIVE
MAGNESIUM SERPL-MCNC: 1.8 MG/DL (ref 1.6–2.4)
MCH RBC QN AUTO: 30.6 PG (ref 26–34)
MCHC RBC AUTO-ENTMCNC: 33.8 G/DL (ref 30–36.5)
MCV RBC AUTO: 90.5 FL (ref 80–99)
NITRITE UR QL STRIP.AUTO: NEGATIVE
NRBC # BLD: 0 K/UL (ref 0–0.01)
NRBC BLD-RTO: 0 PER 100 WBC
PH UR STRIP: 5 (ref 5–8)
PHOSPHATE SERPL-MCNC: 2.9 MG/DL (ref 2.6–4.7)
PLATELET # BLD AUTO: 225 K/UL (ref 150–400)
PMV BLD AUTO: 12.3 FL (ref 8.9–12.9)
POTASSIUM SERPL-SCNC: 4.6 MMOL/L (ref 3.5–5.1)
PROT UR STRIP-MCNC: NEGATIVE MG/DL
RBC # BLD AUTO: 2.32 M/UL (ref 3.8–5.2)
RBC #/AREA URNS HPF: NORMAL /HPF (ref 0–5)
SERVICE CMNT-IMP: NORMAL
SODIUM SERPL-SCNC: 142 MMOL/L (ref 136–145)
SP GR UR REFRACTOMETRY: 1.01
URINE CULTURE IF INDICATED: NORMAL
UROBILINOGEN UR QL STRIP.AUTO: 0.2 EU/DL (ref 0.2–1)
WBC # BLD AUTO: 19.4 K/UL (ref 3.6–11)
WBC URNS QL MICRO: NORMAL /HPF (ref 0–4)

## 2024-08-03 PROCEDURE — 2580000003 HC RX 258: Performed by: HOSPITALIST

## 2024-08-03 PROCEDURE — 2580000003 HC RX 258: Performed by: STUDENT IN AN ORGANIZED HEALTH CARE EDUCATION/TRAINING PROGRAM

## 2024-08-03 PROCEDURE — 36415 COLL VENOUS BLD VENIPUNCTURE: CPT

## 2024-08-03 PROCEDURE — 6370000000 HC RX 637 (ALT 250 FOR IP): Performed by: INTERNAL MEDICINE

## 2024-08-03 PROCEDURE — 81001 URINALYSIS AUTO W/SCOPE: CPT

## 2024-08-03 PROCEDURE — 6360000002 HC RX W HCPCS: Performed by: NURSE PRACTITIONER

## 2024-08-03 PROCEDURE — 2580000003 HC RX 258: Performed by: NURSE PRACTITIONER

## 2024-08-03 PROCEDURE — 85027 COMPLETE CBC AUTOMATED: CPT

## 2024-08-03 PROCEDURE — 6370000000 HC RX 637 (ALT 250 FOR IP): Performed by: HOSPITALIST

## 2024-08-03 PROCEDURE — 2000000000 HC ICU R&B

## 2024-08-03 PROCEDURE — 6360000002 HC RX W HCPCS: Performed by: HOSPITALIST

## 2024-08-03 PROCEDURE — 6360000002 HC RX W HCPCS: Performed by: STUDENT IN AN ORGANIZED HEALTH CARE EDUCATION/TRAINING PROGRAM

## 2024-08-03 PROCEDURE — 83735 ASSAY OF MAGNESIUM: CPT

## 2024-08-03 PROCEDURE — 80048 BASIC METABOLIC PNL TOTAL CA: CPT

## 2024-08-03 PROCEDURE — 84100 ASSAY OF PHOSPHORUS: CPT

## 2024-08-03 PROCEDURE — 2500000003 HC RX 250 WO HCPCS: Performed by: NURSE PRACTITIONER

## 2024-08-03 RX ORDER — SODIUM CHLORIDE, SODIUM LACTATE, POTASSIUM CHLORIDE, AND CALCIUM CHLORIDE .6; .31; .03; .02 G/100ML; G/100ML; G/100ML; G/100ML
1000 INJECTION, SOLUTION INTRAVENOUS ONCE
Status: COMPLETED | OUTPATIENT
Start: 2024-08-03 | End: 2024-08-03

## 2024-08-03 RX ORDER — FAMOTIDINE 20 MG/1
20 TABLET, FILM COATED ORAL 2 TIMES DAILY
Status: DISCONTINUED | OUTPATIENT
Start: 2024-08-03 | End: 2024-08-03

## 2024-08-03 RX ORDER — FAMOTIDINE 20 MG/1
20 TABLET, FILM COATED ORAL DAILY
Status: DISCONTINUED | OUTPATIENT
Start: 2024-08-04 | End: 2024-08-06 | Stop reason: HOSPADM

## 2024-08-03 RX ORDER — MIDODRINE HYDROCHLORIDE 5 MG/1
10 TABLET ORAL
Status: DISCONTINUED | OUTPATIENT
Start: 2024-08-03 | End: 2024-08-05

## 2024-08-03 RX ORDER — NOREPINEPHRINE BITARTRATE 0.06 MG/ML
.5-2 INJECTION, SOLUTION INTRAVENOUS CONTINUOUS
Status: ACTIVE | OUTPATIENT
Start: 2024-08-03 | End: 2024-08-03

## 2024-08-03 RX ORDER — FLUDROCORTISONE ACETATE 0.1 MG/1
0.1 TABLET ORAL 2 TIMES DAILY
Status: DISCONTINUED | OUTPATIENT
Start: 2024-08-03 | End: 2024-08-04

## 2024-08-03 RX ADMIN — SODIUM CHLORIDE, POTASSIUM CHLORIDE, SODIUM LACTATE AND CALCIUM CHLORIDE 1000 ML: 600; 310; 30; 20 INJECTION, SOLUTION INTRAVENOUS at 09:47

## 2024-08-03 RX ADMIN — FLUDROCORTISONE ACETATE 0.1 MG: 0.1 TABLET ORAL at 10:25

## 2024-08-03 RX ADMIN — HYDROCORTISONE SODIUM SUCCINATE 100 MG: 100 INJECTION, POWDER, FOR SOLUTION INTRAMUSCULAR; INTRAVENOUS at 02:37

## 2024-08-03 RX ADMIN — FLUDROCORTISONE ACETATE 0.1 MG: 0.1 TABLET ORAL at 20:25

## 2024-08-03 RX ADMIN — PIPERACILLIN AND TAZOBACTAM 3375 MG: 3; .375 INJECTION, POWDER, FOR SOLUTION INTRAVENOUS; PARENTERAL at 16:03

## 2024-08-03 RX ADMIN — HYDROCORTISONE SODIUM SUCCINATE 100 MG: 100 INJECTION, POWDER, FOR SOLUTION INTRAMUSCULAR; INTRAVENOUS at 16:38

## 2024-08-03 RX ADMIN — ENOXAPARIN SODIUM 30 MG: 100 INJECTION SUBCUTANEOUS at 09:36

## 2024-08-03 RX ADMIN — SODIUM CHLORIDE, PRESERVATIVE FREE 10 ML: 5 INJECTION INTRAVENOUS at 20:25

## 2024-08-03 RX ADMIN — HYDROCORTISONE SODIUM SUCCINATE 100 MG: 100 INJECTION, POWDER, FOR SOLUTION INTRAMUSCULAR; INTRAVENOUS at 09:35

## 2024-08-03 RX ADMIN — PIPERACILLIN AND TAZOBACTAM 3375 MG: 3; .375 INJECTION, POWDER, FOR SOLUTION INTRAVENOUS; PARENTERAL at 06:36

## 2024-08-03 RX ADMIN — FAMOTIDINE 20 MG: 20 TABLET, FILM COATED ORAL at 12:02

## 2024-08-03 RX ADMIN — MIDODRINE HYDROCHLORIDE 5 MG: 5 TABLET ORAL at 09:35

## 2024-08-03 RX ADMIN — MIDODRINE HYDROCHLORIDE 10 MG: 5 TABLET ORAL at 17:04

## 2024-08-03 RX ADMIN — SODIUM ZIRCONIUM CYCLOSILICATE 5 G: 5 POWDER, FOR SUSPENSION ORAL at 09:35

## 2024-08-03 RX ADMIN — MIDODRINE HYDROCHLORIDE 10 MG: 5 TABLET ORAL at 12:02

## 2024-08-03 RX ADMIN — PIPERACILLIN AND TAZOBACTAM 3375 MG: 3; .375 INJECTION, POWDER, FOR SOLUTION INTRAVENOUS; PARENTERAL at 22:58

## 2024-08-03 ASSESSMENT — PAIN SCALES - GENERAL
PAINLEVEL_OUTOF10: 0

## 2024-08-03 NOTE — PLAN OF CARE
Problem: Cardiovascular - Adult  Goal: Maintains optimal cardiac output and hemodynamic stability  Outcome: Progressing  Flowsheets (Taken 8/2/2024 2058)  Maintains optimal cardiac output and hemodynamic stability:   Monitor blood pressure and heart rate   Monitor urine output and notify Licensed Independent Practitioner for values outside of normal range   Assess for signs of decreased cardiac output   Administer fluid and/or volume expanders as ordered   Administer vasoactive medications as ordered     Problem: Safety - Adult  Goal: Free from fall injury  Outcome: Progressing  Flowsheets  Taken 8/2/2024 2058  Free From Fall Injury:   Instruct family/caregiver on patient safety   Based on caregiver fall risk screen, instruct family/caregiver to ask for assistance with transferring infant if caregiver noted to have fall risk factors  Taken 8/2/2024 2000  Free From Fall Injury: Based on caregiver fall risk screen, instruct family/caregiver to ask for assistance with transferring infant if caregiver noted to have fall risk factors

## 2024-08-04 LAB
ANION GAP SERPL CALC-SCNC: 6 MMOL/L (ref 5–15)
BASOPHILS # BLD: 0 K/UL (ref 0–0.1)
BASOPHILS NFR BLD: 0 % (ref 0–1)
BUN SERPL-MCNC: 27 MG/DL (ref 6–20)
BUN/CREAT SERPL: 19 (ref 12–20)
CALCIUM SERPL-MCNC: 8 MG/DL (ref 8.5–10.1)
CHLORIDE SERPL-SCNC: 108 MMOL/L (ref 97–108)
CO2 SERPL-SCNC: 23 MMOL/L (ref 21–32)
CREAT SERPL-MCNC: 1.41 MG/DL (ref 0.55–1.02)
DIFFERENTIAL METHOD BLD: ABNORMAL
EOSINOPHIL # BLD: 0 K/UL (ref 0–0.4)
EOSINOPHIL NFR BLD: 0 % (ref 0–7)
ERYTHROCYTE [DISTWIDTH] IN BLOOD BY AUTOMATED COUNT: 17.2 % (ref 11.5–14.5)
ERYTHROCYTE [DISTWIDTH] IN BLOOD BY AUTOMATED COUNT: 17.2 % (ref 11.5–14.5)
FERRITIN SERPL-MCNC: 228 NG/ML (ref 26–388)
GLUCOSE BLD STRIP.AUTO-MCNC: 126 MG/DL (ref 65–117)
GLUCOSE SERPL-MCNC: 97 MG/DL (ref 65–100)
HCT VFR BLD AUTO: 20.8 % (ref 35–47)
HCT VFR BLD AUTO: 20.8 % (ref 35–47)
HCT VFR BLD AUTO: 27.8 % (ref 35–47)
HGB BLD-MCNC: 6.6 G/DL (ref 11.5–16)
HGB BLD-MCNC: 6.7 G/DL (ref 11.5–16)
HGB BLD-MCNC: 8.9 G/DL (ref 11.5–16)
HISTORY CHECK: NORMAL
IMM GRANULOCYTES # BLD AUTO: 0.1 K/UL (ref 0–0.04)
IMM GRANULOCYTES NFR BLD AUTO: 1 % (ref 0–0.5)
IRON SATN MFR SERPL: 27 % (ref 20–50)
IRON SERPL-MCNC: 67 UG/DL (ref 35–150)
LYMPHOCYTES # BLD: 1.1 K/UL (ref 0.8–3.5)
LYMPHOCYTES NFR BLD: 9 % (ref 12–49)
MAGNESIUM SERPL-MCNC: 1.7 MG/DL (ref 1.6–2.4)
MCH RBC QN AUTO: 29.3 PG (ref 26–34)
MCH RBC QN AUTO: 29.3 PG (ref 26–34)
MCHC RBC AUTO-ENTMCNC: 31.7 G/DL (ref 30–36.5)
MCHC RBC AUTO-ENTMCNC: 32.2 G/DL (ref 30–36.5)
MCV RBC AUTO: 90.8 FL (ref 80–99)
MCV RBC AUTO: 92.4 FL (ref 80–99)
MONOCYTES # BLD: 0.4 K/UL (ref 0–1)
MONOCYTES NFR BLD: 3 % (ref 5–13)
NEUTS SEG # BLD: 11 K/UL (ref 1.8–8)
NEUTS SEG NFR BLD: 87 % (ref 32–75)
NRBC # BLD: 0 K/UL (ref 0–0.01)
NRBC # BLD: 0.02 K/UL (ref 0–0.01)
NRBC BLD-RTO: 0 PER 100 WBC
NRBC BLD-RTO: 0.2 PER 100 WBC
PHOSPHATE SERPL-MCNC: 2.4 MG/DL (ref 2.6–4.7)
PLATELET # BLD AUTO: 183 K/UL (ref 150–400)
PLATELET # BLD AUTO: 190 K/UL (ref 150–400)
PMV BLD AUTO: 12.1 FL (ref 8.9–12.9)
PMV BLD AUTO: 12.2 FL (ref 8.9–12.9)
POTASSIUM SERPL-SCNC: 4.1 MMOL/L (ref 3.5–5.1)
RBC # BLD AUTO: 2.25 M/UL (ref 3.8–5.2)
RBC # BLD AUTO: 2.29 M/UL (ref 3.8–5.2)
RBC MORPH BLD: ABNORMAL
RBC MORPH BLD: ABNORMAL
RETICS # AUTO: 0.03 M/UL (ref 0.02–0.08)
RETICS/RBC NFR AUTO: 1.5 % (ref 0.7–2.1)
SERVICE CMNT-IMP: ABNORMAL
SODIUM SERPL-SCNC: 137 MMOL/L (ref 136–145)
TIBC SERPL-MCNC: 252 UG/DL (ref 250–450)
WBC # BLD AUTO: 12.6 K/UL (ref 3.6–11)
WBC # BLD AUTO: 13 K/UL (ref 3.6–11)

## 2024-08-04 PROCEDURE — 6360000002 HC RX W HCPCS: Performed by: STUDENT IN AN ORGANIZED HEALTH CARE EDUCATION/TRAINING PROGRAM

## 2024-08-04 PROCEDURE — 82962 GLUCOSE BLOOD TEST: CPT

## 2024-08-04 PROCEDURE — 30233N1 TRANSFUSION OF NONAUTOLOGOUS RED BLOOD CELLS INTO PERIPHERAL VEIN, PERCUTANEOUS APPROACH: ICD-10-PCS | Performed by: INTERNAL MEDICINE

## 2024-08-04 PROCEDURE — 83550 IRON BINDING TEST: CPT

## 2024-08-04 PROCEDURE — P9016 RBC LEUKOCYTES REDUCED: HCPCS

## 2024-08-04 PROCEDURE — 2580000003 HC RX 258: Performed by: STUDENT IN AN ORGANIZED HEALTH CARE EDUCATION/TRAINING PROGRAM

## 2024-08-04 PROCEDURE — 6370000000 HC RX 637 (ALT 250 FOR IP): Performed by: HOSPITALIST

## 2024-08-04 PROCEDURE — 85014 HEMATOCRIT: CPT

## 2024-08-04 PROCEDURE — 83735 ASSAY OF MAGNESIUM: CPT

## 2024-08-04 PROCEDURE — 2580000003 HC RX 258: Performed by: NURSE PRACTITIONER

## 2024-08-04 PROCEDURE — 2060000000 HC ICU INTERMEDIATE R&B

## 2024-08-04 PROCEDURE — 6360000002 HC RX W HCPCS: Performed by: HOSPITALIST

## 2024-08-04 PROCEDURE — 6360000002 HC RX W HCPCS: Performed by: GENERAL ACUTE CARE HOSPITAL

## 2024-08-04 PROCEDURE — 86901 BLOOD TYPING SEROLOGIC RH(D): CPT

## 2024-08-04 PROCEDURE — 85018 HEMOGLOBIN: CPT

## 2024-08-04 PROCEDURE — 86850 RBC ANTIBODY SCREEN: CPT

## 2024-08-04 PROCEDURE — 85027 COMPLETE CBC AUTOMATED: CPT

## 2024-08-04 PROCEDURE — 83540 ASSAY OF IRON: CPT

## 2024-08-04 PROCEDURE — 85045 AUTOMATED RETICULOCYTE COUNT: CPT

## 2024-08-04 PROCEDURE — 80048 BASIC METABOLIC PNL TOTAL CA: CPT

## 2024-08-04 PROCEDURE — 85025 COMPLETE CBC W/AUTO DIFF WBC: CPT

## 2024-08-04 PROCEDURE — 84100 ASSAY OF PHOSPHORUS: CPT

## 2024-08-04 PROCEDURE — 86923 COMPATIBILITY TEST ELECTRIC: CPT

## 2024-08-04 PROCEDURE — 86900 BLOOD TYPING SEROLOGIC ABO: CPT

## 2024-08-04 PROCEDURE — 82728 ASSAY OF FERRITIN: CPT

## 2024-08-04 PROCEDURE — 36430 TRANSFUSION BLD/BLD COMPNT: CPT

## 2024-08-04 PROCEDURE — 6370000000 HC RX 637 (ALT 250 FOR IP): Performed by: INTERNAL MEDICINE

## 2024-08-04 PROCEDURE — 36415 COLL VENOUS BLD VENIPUNCTURE: CPT

## 2024-08-04 RX ORDER — FLUDROCORTISONE ACETATE 0.1 MG/1
0.1 TABLET ORAL DAILY
Status: DISCONTINUED | OUTPATIENT
Start: 2024-08-05 | End: 2024-08-06 | Stop reason: HOSPADM

## 2024-08-04 RX ORDER — SODIUM CHLORIDE 9 MG/ML
INJECTION, SOLUTION INTRAVENOUS PRN
Status: DISCONTINUED | OUTPATIENT
Start: 2024-08-04 | End: 2024-08-05

## 2024-08-04 RX ORDER — ATORVASTATIN CALCIUM 40 MG/1
40 TABLET, FILM COATED ORAL NIGHTLY
Status: DISCONTINUED | OUTPATIENT
Start: 2024-08-04 | End: 2024-08-06 | Stop reason: HOSPADM

## 2024-08-04 RX ORDER — ASPIRIN 81 MG/1
81 TABLET, CHEWABLE ORAL DAILY
Status: DISCONTINUED | OUTPATIENT
Start: 2024-08-05 | End: 2024-08-06 | Stop reason: HOSPADM

## 2024-08-04 RX ADMIN — SODIUM CHLORIDE, PRESERVATIVE FREE 10 ML: 5 INJECTION INTRAVENOUS at 08:28

## 2024-08-04 RX ADMIN — PIPERACILLIN AND TAZOBACTAM 3375 MG: 3; .375 INJECTION, POWDER, FOR SOLUTION INTRAVENOUS; PARENTERAL at 07:36

## 2024-08-04 RX ADMIN — FLUDROCORTISONE ACETATE 0.1 MG: 0.1 TABLET ORAL at 08:28

## 2024-08-04 RX ADMIN — PIPERACILLIN AND TAZOBACTAM 3375 MG: 3; .375 INJECTION, POWDER, FOR SOLUTION INTRAVENOUS; PARENTERAL at 23:16

## 2024-08-04 RX ADMIN — HYDROCORTISONE SODIUM SUCCINATE 100 MG: 100 INJECTION, POWDER, FOR SOLUTION INTRAMUSCULAR; INTRAVENOUS at 00:55

## 2024-08-04 RX ADMIN — ATORVASTATIN CALCIUM 40 MG: 40 TABLET, FILM COATED ORAL at 21:16

## 2024-08-04 RX ADMIN — SODIUM CHLORIDE: 9 INJECTION, SOLUTION INTRAVENOUS at 07:35

## 2024-08-04 RX ADMIN — MIDODRINE HYDROCHLORIDE 10 MG: 5 TABLET ORAL at 08:28

## 2024-08-04 RX ADMIN — PIPERACILLIN AND TAZOBACTAM 3375 MG: 3; .375 INJECTION, POWDER, FOR SOLUTION INTRAVENOUS; PARENTERAL at 16:58

## 2024-08-04 RX ADMIN — FAMOTIDINE 20 MG: 20 TABLET, FILM COATED ORAL at 08:28

## 2024-08-04 RX ADMIN — MIDODRINE HYDROCHLORIDE 10 MG: 5 TABLET ORAL at 16:58

## 2024-08-04 RX ADMIN — HYDROCORTISONE SODIUM SUCCINATE 50 MG: 100 INJECTION, POWDER, FOR SOLUTION INTRAMUSCULAR; INTRAVENOUS at 16:58

## 2024-08-04 RX ADMIN — SODIUM CHLORIDE, PRESERVATIVE FREE 10 ML: 5 INJECTION INTRAVENOUS at 21:17

## 2024-08-04 RX ADMIN — HYDROCORTISONE SODIUM SUCCINATE 100 MG: 100 INJECTION, POWDER, FOR SOLUTION INTRAMUSCULAR; INTRAVENOUS at 08:28

## 2024-08-04 RX ADMIN — MIDODRINE HYDROCHLORIDE 10 MG: 5 TABLET ORAL at 12:19

## 2024-08-04 NOTE — CONSENT
Informed Consent for Blood Component Transfusion Note    I have discussed with the patient the rationale for blood component transfusion; its benefits in treating or preventing fatigue, organ damage, or death; and its risk which includes mild transfusion reactions, rare risk of blood borne infection, or more serious but rare reactions. I have discussed the alternatives to transfusion, including the risk and consequences of not receiving transfusion. The patient had an opportunity to ask questions and had agreed to proceed with transfusion of blood components.    Electronically signed by Nisha Bolton MD on 8/4/24 at 8:47 AM EDT

## 2024-08-04 NOTE — PLAN OF CARE
Problem: Discharge Planning  Goal: Discharge to home or other facility with appropriate resources  8/4/2024 1415 by Montserrat Brown RN  Outcome: Progressing  Flowsheets (Taken 8/4/2024 0800)  Discharge to home or other facility with appropriate resources: Identify barriers to discharge with patient and caregiver  8/4/2024 0658 by Tierney Cohen RN  Outcome: Progressing     Problem: Pain  Goal: Verbalizes/displays adequate comfort level or baseline comfort level  8/4/2024 1415 by Montserrat Brown RN  Outcome: Progressing  Flowsheets (Taken 8/4/2024 1415)  Verbalizes/displays adequate comfort level or baseline comfort level: Encourage patient to monitor pain and request assistance  8/4/2024 0658 by Tierney Cohen RN  Outcome: Progressing     Problem: Safety - Adult  Goal: Free from fall injury  8/4/2024 1415 by Montserrat Brown RN  Outcome: Progressing  Flowsheets (Taken 8/2/2024 2058 by Nam Buchanan RN)  Free From Fall Injury:   Instruct family/caregiver on patient safety   Based on caregiver fall risk screen, instruct family/caregiver to ask for assistance with transferring infant if caregiver noted to have fall risk factors  8/4/2024 0658 by Tierney Cohen RN  Outcome: Progressing     Problem: ABCDS Injury Assessment  Goal: Absence of physical injury  8/4/2024 1415 by Montserrat Brown RN  Outcome: Progressing  Flowsheets (Taken 8/4/2024 1415)  Absence of Physical Injury: Implement safety measures based on patient assessment  8/4/2024 0658 by Tiereny Cohen RN  Outcome: Progressing     Problem: Cardiovascular - Adult  Goal: Maintains optimal cardiac output and hemodynamic stability  Outcome: Progressing  Flowsheets (Taken 8/2/2024 2058 by Nam Buchanan RN)  Maintains optimal cardiac output and hemodynamic stability:   Monitor blood pressure and heart rate   Monitor urine output and notify Licensed Independent Practitioner for values outside of normal range   Assess for signs of

## 2024-08-04 NOTE — CONSULTS
[facility]        Consult    NAME: Zeenat Rolle   :  1945   MRN:  [id number]     Date/Time:  2024 3:23 PM    Patient PCP: Lefty Segura MD  ________________________________________________________________________     Assessment:     - Increased potassium unclear  - Anemia unclear  - Low bp    - No  hx of CAD  - Echo 2024 nl EF  - Sinus bradycardia  - Hypotension unclear  - Dyslipidemia  - CKD stage 3, hyperkalemia unclear, Dr. Edna Lang  - Anemia followed by [facility], needed transfusion 2024  - Aortic atherosclerosis with moderate left common carotid, severe left subclavian, severe celiac stenosis followed by vascular  - Dilated esophogus/achalasia  - Quit tobacco  - No Etoh, no drugs  , lives alone, 3 kids, walks with walking sticks, has a cane        Plan:     - No chest pain, negative enzymes during last hospital stay  - Euvolemic, echo with normal EF  - Sinus bradycardia on tele    - Possible adrenal insuficiency, on stress dose steroids  - Receiving transfusion for unclear anemia, not iron deficient    - Cont asa  - Decrease added florinef from 0.1 bid to qd  - cont midodrine for now, follow bp  - ?how long for IV hydrocortisone  - Cont atorvastatin    Did have recent syncope with lasix  Follow bp and hr, not clear if PPM indicated at this point       [x]       High complexity decision making was performed in this patient at high risk for decompensation with multiple organ involvement.      Subjective:   CHIEF COMPLAINT: High K    HISTORY OF PRESENT ILLNESS:       Zeenat Rolle is an 80 yo female with a PMH of GERD, HLD, hyperparathyroidism, vertebral artery stenosis, CKD3 and L SC artery stenosis who presented to the ED at [facility] on  with hyperkalemia (no associated CA on CKD).  She had been seen in clinic for syncope and was found to have hyperkalemia.  She was prescribed lowkelma,  but was unable to pick this up at pharmacy due to insurance issues.  In the ED,

## 2024-08-05 ENCOUNTER — TELEPHONE (OUTPATIENT)
Age: 79
End: 2024-08-05

## 2024-08-05 LAB
ABO + RH BLD: NORMAL
BLD PROD TYP BPU: NORMAL
BLOOD BANK BLOOD PRODUCT EXPIRATION DATE: NORMAL
BLOOD BANK DISPENSE STATUS: NORMAL
BLOOD BANK ISBT PRODUCT BLOOD TYPE: 7300
BLOOD BANK PRODUCT CODE: NORMAL
BLOOD BANK UNIT TYPE AND RH: NORMAL
BLOOD GROUP ANTIBODIES SERPL: NORMAL
BPU ID: NORMAL
CROSSMATCH RESULT: NORMAL
EKG ATRIAL RATE: 87 BPM
EKG DIAGNOSIS: NORMAL
EKG P AXIS: 55 DEGREES
EKG P-R INTERVAL: 216 MS
EKG Q-T INTERVAL: 486 MS
EKG QRS DURATION: 96 MS
EKG QTC CALCULATION (BAZETT): 520 MS
EKG R AXIS: 60 DEGREES
EKG T AXIS: 82 DEGREES
EKG VENTRICULAR RATE: 69 BPM
SPECIMEN EXP DATE BLD: NORMAL
UNIT DIVISION: 0
UNIT ISSUE DATE/TIME: NORMAL

## 2024-08-05 PROCEDURE — 6360000002 HC RX W HCPCS: Performed by: STUDENT IN AN ORGANIZED HEALTH CARE EDUCATION/TRAINING PROGRAM

## 2024-08-05 PROCEDURE — 6360000002 HC RX W HCPCS: Performed by: GENERAL ACUTE CARE HOSPITAL

## 2024-08-05 PROCEDURE — 2580000003 HC RX 258: Performed by: STUDENT IN AN ORGANIZED HEALTH CARE EDUCATION/TRAINING PROGRAM

## 2024-08-05 PROCEDURE — 6370000000 HC RX 637 (ALT 250 FOR IP): Performed by: HOSPITALIST

## 2024-08-05 PROCEDURE — 6370000000 HC RX 637 (ALT 250 FOR IP): Performed by: INTERNAL MEDICINE

## 2024-08-05 PROCEDURE — 2580000003 HC RX 258: Performed by: NURSE PRACTITIONER

## 2024-08-05 PROCEDURE — 2060000000 HC ICU INTERMEDIATE R&B

## 2024-08-05 RX ORDER — MIDODRINE HYDROCHLORIDE 5 MG/1
5 TABLET ORAL
Status: DISCONTINUED | OUTPATIENT
Start: 2024-08-05 | End: 2024-08-05

## 2024-08-05 RX ADMIN — FAMOTIDINE 20 MG: 20 TABLET, FILM COATED ORAL at 08:53

## 2024-08-05 RX ADMIN — HYDROCORTISONE SODIUM SUCCINATE 50 MG: 100 INJECTION, POWDER, FOR SOLUTION INTRAMUSCULAR; INTRAVENOUS at 01:35

## 2024-08-05 RX ADMIN — PIPERACILLIN AND TAZOBACTAM 3375 MG: 3; .375 INJECTION, POWDER, FOR SOLUTION INTRAVENOUS; PARENTERAL at 06:56

## 2024-08-05 RX ADMIN — ATORVASTATIN CALCIUM 40 MG: 40 TABLET, FILM COATED ORAL at 20:08

## 2024-08-05 RX ADMIN — ASPIRIN 81 MG: 81 TABLET, CHEWABLE ORAL at 08:53

## 2024-08-05 RX ADMIN — SODIUM CHLORIDE, PRESERVATIVE FREE 10 ML: 5 INJECTION INTRAVENOUS at 08:55

## 2024-08-05 RX ADMIN — SODIUM CHLORIDE, PRESERVATIVE FREE 10 ML: 5 INJECTION INTRAVENOUS at 20:08

## 2024-08-05 RX ADMIN — HYDROCORTISONE SODIUM SUCCINATE 50 MG: 100 INJECTION, POWDER, FOR SOLUTION INTRAMUSCULAR; INTRAVENOUS at 08:55

## 2024-08-05 RX ADMIN — FLUDROCORTISONE ACETATE 0.1 MG: 0.1 TABLET ORAL at 08:53

## 2024-08-05 ASSESSMENT — PAIN SCALES - GENERAL
PAINLEVEL_OUTOF10: 0

## 2024-08-05 NOTE — TELEPHONE ENCOUNTER
[phone] Christin Lentz dropped off a form regarding her moms medication.  She is in the hospital, the  gave her the form to give Dr. Segura to sign so she would be able to get the  medicine cheaper.  The form was placed in provider's box.

## 2024-08-05 NOTE — PLAN OF CARE
Problem: Safety - Adult  Goal: Free from fall injury  8/5/2024 0345 by Blessed Ralph RN  Outcome: Progressing  8/4/2024 1415 by Montserrat Brown RN  Outcome: Progressing  Flowsheets (Taken 8/2/2024 2058 by Nam Buchanan RN)  Free From Fall Injury:   Instruct family/caregiver on patient safety   Based on caregiver fall risk screen, instruct family/caregiver to ask for assistance with transferring infant if caregiver noted to have fall risk factors   Patient free from falls or injury  Problem: Pain  Goal: Verbalizes/displays adequate comfort level or baseline comfort level  8/5/2024 0345 by Blessed Ralph RN  Outcome: Progressing  8/4/2024 1415 by Montserrat Brown RN  Outcome: Progressing  Flowsheets (Taken 8/4/2024 1415)  Verbalizes/displays adequate comfort level or baseline comfort level: Encourage patient to monitor pain and request assistance   Patient denies pain at time of assessment

## 2024-08-06 VITALS
RESPIRATION RATE: 17 BRPM | OXYGEN SATURATION: 98 % | BODY MASS INDEX: 26.21 KG/M2 | SYSTOLIC BLOOD PRESSURE: 128 MMHG | HEART RATE: 54 BPM | DIASTOLIC BLOOD PRESSURE: 84 MMHG | HEIGHT: 60 IN | WEIGHT: 133.5 LBS | TEMPERATURE: 97.1 F

## 2024-08-06 LAB
ALBUMIN SERPL-MCNC: 2.9 G/DL (ref 3.5–5)
ALBUMIN/GLOB SERPL: 0.9 (ref 1.1–2.2)
ALP SERPL-CCNC: 130 U/L (ref 45–117)
ALT SERPL-CCNC: 139 U/L (ref 12–78)
ANION GAP SERPL CALC-SCNC: 5 MMOL/L (ref 5–15)
AST SERPL-CCNC: 80 U/L (ref 15–37)
BILIRUB SERPL-MCNC: 0.4 MG/DL (ref 0.2–1)
BUN SERPL-MCNC: 23 MG/DL (ref 6–20)
BUN/CREAT SERPL: 17 (ref 12–20)
CALCIUM SERPL-MCNC: 8.5 MG/DL (ref 8.5–10.1)
CHLORIDE SERPL-SCNC: 110 MMOL/L (ref 97–108)
CO2 SERPL-SCNC: 26 MMOL/L (ref 21–32)
CREAT SERPL-MCNC: 1.38 MG/DL (ref 0.55–1.02)
EKG ATRIAL RATE: 55 BPM
EKG DIAGNOSIS: NORMAL
EKG P AXIS: 63 DEGREES
EKG P-R INTERVAL: 198 MS
EKG Q-T INTERVAL: 416 MS
EKG QRS DURATION: 92 MS
EKG QTC CALCULATION (BAZETT): 397 MS
EKG R AXIS: 70 DEGREES
EKG T AXIS: 79 DEGREES
EKG VENTRICULAR RATE: 55 BPM
ERYTHROCYTE [DISTWIDTH] IN BLOOD BY AUTOMATED COUNT: 16.3 % (ref 11.5–14.5)
GLOBULIN SER CALC-MCNC: 3.3 G/DL (ref 2–4)
GLUCOSE SERPL-MCNC: 81 MG/DL (ref 65–100)
HCT VFR BLD AUTO: 23.8 % (ref 35–47)
HCT VFR BLD AUTO: 25.7 % (ref 35–47)
HGB BLD-MCNC: 7.9 G/DL (ref 11.5–16)
HGB BLD-MCNC: 8.8 G/DL (ref 11.5–16)
LACTATE SERPL-SCNC: 1.1 MMOL/L (ref 0.4–2)
MAGNESIUM SERPL-MCNC: 1.9 MG/DL (ref 1.6–2.4)
MCH RBC QN AUTO: 29 PG (ref 26–34)
MCHC RBC AUTO-ENTMCNC: 33.2 G/DL (ref 30–36.5)
MCV RBC AUTO: 87.5 FL (ref 80–99)
NRBC # BLD: 0.02 K/UL (ref 0–0.01)
NRBC BLD-RTO: 0.2 PER 100 WBC
PHOSPHATE SERPL-MCNC: 2.7 MG/DL (ref 2.6–4.7)
PLATELET # BLD AUTO: 171 K/UL (ref 150–400)
PMV BLD AUTO: 11.9 FL (ref 8.9–12.9)
POTASSIUM SERPL-SCNC: 3.2 MMOL/L (ref 3.5–5.1)
PROT SERPL-MCNC: 6.2 G/DL (ref 6.4–8.2)
RBC # BLD AUTO: 2.72 M/UL (ref 3.8–5.2)
SODIUM SERPL-SCNC: 141 MMOL/L (ref 136–145)
WBC # BLD AUTO: 9.6 K/UL (ref 3.6–11)

## 2024-08-06 PROCEDURE — 85027 COMPLETE CBC AUTOMATED: CPT

## 2024-08-06 PROCEDURE — 97161 PT EVAL LOW COMPLEX 20 MIN: CPT

## 2024-08-06 PROCEDURE — 2580000003 HC RX 258: Performed by: NURSE PRACTITIONER

## 2024-08-06 PROCEDURE — 83605 ASSAY OF LACTIC ACID: CPT

## 2024-08-06 PROCEDURE — 97165 OT EVAL LOW COMPLEX 30 MIN: CPT

## 2024-08-06 PROCEDURE — 84100 ASSAY OF PHOSPHORUS: CPT

## 2024-08-06 PROCEDURE — 80053 COMPREHEN METABOLIC PANEL: CPT

## 2024-08-06 PROCEDURE — 97535 SELF CARE MNGMENT TRAINING: CPT

## 2024-08-06 PROCEDURE — 6370000000 HC RX 637 (ALT 250 FOR IP): Performed by: INTERNAL MEDICINE

## 2024-08-06 PROCEDURE — 85014 HEMATOCRIT: CPT

## 2024-08-06 PROCEDURE — 6370000000 HC RX 637 (ALT 250 FOR IP): Performed by: STUDENT IN AN ORGANIZED HEALTH CARE EDUCATION/TRAINING PROGRAM

## 2024-08-06 PROCEDURE — 83735 ASSAY OF MAGNESIUM: CPT

## 2024-08-06 PROCEDURE — 97116 GAIT TRAINING THERAPY: CPT

## 2024-08-06 PROCEDURE — 36415 COLL VENOUS BLD VENIPUNCTURE: CPT

## 2024-08-06 PROCEDURE — 6370000000 HC RX 637 (ALT 250 FOR IP): Performed by: HOSPITALIST

## 2024-08-06 PROCEDURE — 85018 HEMOGLOBIN: CPT

## 2024-08-06 RX ORDER — FLUDROCORTISONE ACETATE 0.1 MG/1
0.1 TABLET ORAL DAILY
Qty: 30 TABLET | Refills: 0 | Status: SHIPPED | OUTPATIENT
Start: 2024-08-07 | End: 2024-09-06

## 2024-08-06 RX ORDER — POTASSIUM CHLORIDE 20 MEQ/1
40 TABLET, EXTENDED RELEASE ORAL ONCE
Status: COMPLETED | OUTPATIENT
Start: 2024-08-06 | End: 2024-08-06

## 2024-08-06 RX ADMIN — SODIUM CHLORIDE, PRESERVATIVE FREE 10 ML: 5 INJECTION INTRAVENOUS at 08:12

## 2024-08-06 RX ADMIN — POTASSIUM CHLORIDE 40 MEQ: 1500 TABLET, EXTENDED RELEASE ORAL at 04:52

## 2024-08-06 RX ADMIN — ASPIRIN 81 MG: 81 TABLET, CHEWABLE ORAL at 08:17

## 2024-08-06 RX ADMIN — FAMOTIDINE 20 MG: 20 TABLET, FILM COATED ORAL at 08:14

## 2024-08-06 RX ADMIN — FLUDROCORTISONE ACETATE 0.1 MG: 0.1 TABLET ORAL at 08:14

## 2024-08-06 ASSESSMENT — PAIN SCALES - GENERAL
PAINLEVEL_OUTOF10: 0
PAINLEVEL_OUTOF10: 0

## 2024-08-06 NOTE — DISCHARGE SUMMARY
Discharge Summary    Name: Zeenat Rolle  [id number]  YOB: 1945 (Age: 79 y.o.)   Date of Admission: 8/1/2024  Date of Discharge: 8/6/2024  Attending Physician: Blaine Soares MD    Discharge Diagnosis:   Shock  Lactic acidosis  Hyperkalemia, recurrent, 6.3  Hypothermia  ?  Adrenal insufficiency  Recent syncope, discharged on late July  Bradycardia  Anemia, normocytic  GERD  Achalasia  Stenosis of left subclavian artery  Vertebral artery stenosis  Hyperlipidemia    Consultations:  PHARMACY TO DOSE VANCOMYCIN  IP CONSULT TO ENDOCRINOLOGY  IP CONSULT TO NEPHROLOGY  IP CONSULT TO CARDIOLOGY  IP CONSULT HOME HEALTH      Brief Admission History/Reason for Admission Per Jose Angel Rodriguez MD:   CHIEF COMPLAINT: fall     HISTORY OF PRESENT ILLNESS:     Zeenat Rolle is a 79 y.o.  female with PMHx significant for GERD, hyperlipidemia presented to ED with c/o fall. Patient states that she tripped and had a fall.   Patient's son who is by the bedside, states that he heard loud noise and went to see patient, she was on the floor. She was unconscious, he placed on chair, she kept falling on the side. He thinks patient passed out.   Patient denies hitting head. She says that she was able to catch herself and prevent from hurting.        We were asked to admit for work up and evaluation of the above problems.     Brief Hospital Course by Main Problems:   Shock  Lactic acidosis  Hyperkalemia, recurrent, 6.3  Hypothermia  ?  Adrenal insufficiency  Recent syncope, discharged on late July  Initially admitted to the ICU, status post IV fluid and vasopressors.  Procalcitonin negative.  Lactic acid 5.4 on admission, resolved   Blood cultures negative  S/p  empiric Zosyn, discontinue as no clear source of infection  Blood pressure stable  Cortisol stimulation test equivocal.  Endocrinology consulted.  Started on Florinef, as per Endo \"biochemical work-up would argue against adrenal

## 2024-08-06 NOTE — CARE COORDINATION
Transition of Care Plan:     RUR: 16     Prior Level of Functioning: Independent     Disposition: Home Alone with OLIVIER with [facility]   2:45 PM   CM received message from [facility] requesting DC Summary. CM sent a perfect serve to the hospitalist and asked her to message me once it is in so I can send to [facility].   1:56 PM   CM noted DC order.   [facility] accepted an notified of DC  DTR will transport Pt home.      If SNF or IPR: Date FOC offered:   Date FOC received:   Accepting facility:   Date authorization started with reference number:   Date authorization received and expires:      Follow up appointments: PCP: [place]: BROOKE emailed CM Specialist.   DME needed: n/a  Transportation at discharge: DTR  IM/IMM Medicare/ letter given: 2nd IM 8/5  Is patient a [place] and connected with [facility]? N/a              If yes, was [place] transfer form completed and VA notified?   Caregiver Contact: Danielleri Deirdre [phone] daughter   Discharge Caregiver contacted prior to discharge? yes  Care Conference needed? N/a  Barriers to discharge:     No further CM needs.     Tierney Bustos CM  [id number]       08/06/24 1354   Services At/After Discharge   Transition of Care Consult (CM Consult) N/A   Services At/After Discharge None    Resource Information Provided? No   Mode of Transport at Discharge Other (see comment)  (DTR will transport.)   Confirm Follow Up Transport Family   Condition of Participation: Discharge Planning   The Plan for Transition of Care is related to the following treatment goals: Home with [facility]   The Patient and/or Patient Representative was provided with a Choice of Provider? Patient   The Patient and/Or Patient Representative agree with the Discharge Plan? Yes   Freedom of Choice list was provided with basic dialogue that supports the patient's individualized plan of care/goals, treatment preferences, and shares the quality data associated with the 
Transition of Care Plan:    RUR: 16    Prior Level of Functioning: Independent    Disposition: Home Alone with OLIVIER with [facility] - Pending  3:26 PM   CM completed chart review.   PT/OT have been ordered: CM will look for disposition rec. They will probably see Pt tomorrow morning.   Pt was had [facility] SN/PT/OT coming out to see her prior to hospitalization. CM sending resumption of care order to them today and will send update tomorrow once clear for DC.     Pt is aware of possible DC tomorrow and DTR will transport.     If SNF or IPR: Date FOC offered:   Date FOC received:   Accepting facility:   Date authorization started with reference number:   Date authorization received and expires:     Follow up appointments: PCP: [place]: BROOKE emailed CM Specialist.   DME needed: n/a  Transportation at discharge: DTR  IM/IMM Medicare/ letter given: 2nd IM 8/5  Is patient a [place] and connected with VA? N/a   If yes, was [place] transfer form completed and VA notified?   Caregiver Contact: Marcia Gilmore [phone] daughter   Discharge Caregiver contacted prior to discharge? yes  Care Conference needed? N/a  Barriers to discharge: Nephrology  Orthostatic Improvement  PT/OT    Tierney Bustos CM  [id number]    
discharge plan with any other family members/significant others, and if so, who? Yes  (Marcia Gilmore [phone] daughter)   Financial Resources Medicare   Community Resources None   CM/SW Referral ADLs/IADLs   Social/Functional History   Lives With Alone   Type of Home House   Home Layout Two level  (13 stairs internally)   Home Access Stairs to enter with rails   Entrance Stairs - Number of Steps 3   Entrance Stairs - Rails Both   Bathroom Shower/Tub Tub/Shower unit   Bathroom Equipment None   Home Equipment Cane  (Walking sticks)   Receives Help From Family   ADL Assistance Independent   Homemaking Assistance Independent   Ambulation Assistance Independent   Active  Yes   Mode of Transportation Car   Occupation Retired   Discharge Planning   Type of Residence House   Living Arrangements Alone   Current Services Prior To Admission None  (HH in the past)   Potential Assistance Needed Durable Medical Equipment   DME Ordered? No   Potential Assistance Purchasing Medications No   Patient expects to be discharged to: [place]        08/02/24 1035   Readmission Assessment   Number of Days since last admission? 8-30 days   Previous Disposition Home Alone   Who is being Interviewed   (Marcia Francis [phone] daughter)   What was the patient's/caregiver's perception as to why they think they needed to return back to the hospital? Other (Comment);Did not understand their medication regimen  (home diuretic was changed; contributing to elevated K+ levels)   Did you visit your Primary Care Physician after you left the hospital, before you returned this time? Yes  (Saw Dr. Segura twice)   Did you see a specialist, such as Cardiac, Pulmonary, Orthopedic Physician, etc. after you left the hospital? Yes  (Saw Dr. Garcia and Dr. Arvizu)   Who advised the patient to return to the hospital? Caregiver   Does the patient report anything that got in the way of taking their medications? No   What reasons did they give?   (N/A)

## 2024-08-06 NOTE — DISCHARGE INSTRUCTIONS
DISCHARGE DIAGNOSIS:    Shock  Lactic acidosis  Hyperkalemia, recurrent, 6.3  Hypothermia  ?  Adrenal insufficiency  Recent syncope, discharged on late July  Bradycardia  Anemia, normocytic  GERD  Achalasia  Stenosis of left subclavian artery  Vertebral artery stenosis  Hyperlipidemia    MEDICATIONS:  It is important that you take the medication exactly as they are prescribed.   Keep your medication in the bottles provided by the pharmacist and keep a list of the medication names, dosages, and times to be taken in your wallet.   Do not take other medications without consulting your doctor.     Pain Management: per above medications    What to do at Home    Recommended diet:  regular diet    Recommended activity: activity as tolerated and ambulate in house    If you have questions regarding the hospital related prescriptions or hospital related issues please call [facility] at . You can always direct your questions to your primary care doctor if you are unable to reach your hospital physician; your PCP works as an extension of your hospital doctor just like your hospital doctor is an extension of your PCP for your time at the hospital ([facility]).    If you experience any of the following symptoms then please call your primary care physician or return to the emergency room if you cannot get hold of your doctor:  Fever, chills, nausea, vomiting, diarrhea, change in mentation, falling, bleeding, shortness of breath, ***

## 2024-08-06 NOTE — PROGRESS NOTES
NAME: Zeenat Rolle        :  1945        MRN:  [id number]                 Assessment   :                                               Plan:  CKD stage 3 (Dr. Edna Lang)  Recurrent hyperkalemia  Anemia - recently referred to [facility]  Recent syncope  Hypotension  Bradycardia K now 4 to 4.6 (peaked at 6.3 ) - s/p IVF bolus, albuterol, calcium, insulin/glucose, bicarb, Lokelma; I would avoid LR as crystalloid for now; continue with lokelma qod for now      Creatinine 1.3 to 1.4; bland urine early ; CTA abd/pelvis - no hydro     Elevated lactic acid    Endocrine following; On hydrocortisone; check orthostatic vs's; might try some florinef if no improvement?    HR 40's; cards eval?     On midodrine        Subjective:     Chief Complaint:  awake. Getting transfusion of prbc's. Chart reviewed. Denies lh/dz. BP soft.     Review of Systems:    Symptom Y/N Comments  Symptom Y/N Comments   Fever/Chills    Chest Pain     Poor Appetite    Edema     Cough    Abdominal Pain     Sputum    Joint Pain     SOB/CANNON    Pruritis/Rash     Nausea/vomit    Tolerating PT/OT     Diarrhea    Tolerating Diet     Constipation    Other       Could not obtain due to:      Objective:     VITALS:   Last 24hrs VS reviewed since prior progress note. Most recent are:  Vitals:    24 0154   BP: (!) 115/58   Pulse: (!) 42   Resp: 13   Temp:    SpO2: 99%       Intake/Output Summary (Last 24 hours) at 2024 0642  Last data filed at 2024 0519  Gross per 24 hour   Intake 1257.93 ml   Output 450 ml   Net 807.93 ml        Telemetry Reviewed:     PHYSICAL EXAM:  General: NAD      Lab Data Reviewed: (see below)    Medications Reviewed: (see below)    PMH/SH reviewed - no change compared to H&P  ________________________________________________________________________  Care Plan discussed with:  Patient     Family      RN     Care Manager                    
                                                                         NAME: Zeenat Rolle        :  1945        MRN:  [id number]                 Assessment   :                                               Plan:  CKD stage 3 (Dr. Edna Lang)  Recurrent hyperkalemia  Anemia - recently [facility]  Recent syncope  Hypotension  Bradycardia   potassium LOW AT 3.4.  On Florinef.    Bradycardic  Okay to discharge today  Renal panel in 1 week  Outpatient follow-up with [facility] 2 weeks  Creatinine 1.3 to 1.4; bland urine early ; CTA abd/pelvis - no hydro      b       Subjective:      seen and examined  Out of bed in chair  Heart rate in the UPPER 40s  No lightheadedness  Asking about going home    Objective:     VITALS:   Last 24hrs VS reviewed since prior progress note. Most recent are:  Vitals:    24 1114   BP: 128/84   Pulse: (!) 47   Resp: 12   Temp: 97.1 °F (36.2 °C)   SpO2:        Intake/Output Summary (Last 24 hours) at 2024 1339  Last data filed at 2024 0858  Gross per 24 hour   Intake 540 ml   Output --   Net 540 ml      Telemetry Reviewed:     PHYSICAL EXAM:  General: NAD   alert oriented x 3, no evidence of focal neurodeficits   nonlabored respiration    Lab Data Reviewed: (see below)    Medications Reviewed: (see below)    PMH/SH reviewed - no change compared to H&P  ________________________________________________________________________  Care Plan discussed with:  Patient     Family      RN     Care Manager                    Consultant:          Comments   >50% of visit spent in counseling and coordination of care       ________________________________________________________________________  Juanita Huitron MD     Procedures: see electronic medical records for all procedures/Xrays and details which  were not copied into this note but were reviewed prior to creation of Plan.      LABS:  Recent Labs     24  0618 24  1857 24  0320 24  1209   WBC 12.6*  
                 Critical Care Progress Note  Jose Angel Rodriguez MD          Date of Service:  8/3/2024  NAME:  Zeenat Rolle  :  1945  MRN:  [id number]      Subjective/Hospital course:      8/3: Patient reports feeling the same as yesterday. Still on levophed. No acute events overnight.        Active Problems Being Managed:     --Shock  --lactic acidosis.  --Relative adrenal insufficiency.  --CKD stage 3.  --3mm RML pulmonary nodule - Needs 12 months F/u CT chest. Has h/o smoking.  --Ongoing chroninc anemia.  --Leukocytosis.    Assessment/Plan:     NEUROLOGICAL:      No acute issues  Oriented x3     PULMONOLOGY:      --3mm RML pulmonary nodule - Needs 12 months F/u CT chest. Has h/o smoking.     CARDIOVASCULAR:      Shock, undifferentiated: Elevated LA likely due to hypoperfusion rather than infection. Lack of response to 3L IVF led to initiation of vasopressors.  Given hyperkalemia without associated CA on CKD.  - South stim was normal but still has relative adrenal insufficiency.   - Thyroid studies ordered  - Blood cultures  - Empiric zosyn  - UA with reflex to cultures.  - Trend LA-improved.  - Wean pressors for goal SBP >80 and MAP >60. Chronically hypotensive.  - Start florinef.        GASTROINTESTINAL                 ADAT     RENAL/ELECTROLYTE/FLUIDS:      CKD3: At baseline  - Monitor UOP  - Daily BMP     ENDOCRINE:      Maintain euglycemia  BG on labs 128     HEMATOLOGY/ONCOLOGY:      Chronic anemia  - Daily CBC  - No evidence of acute bleeding     INFECTIOUS DISEASE:      ANTIBIOTICS TO DATE:    Zosyn     CULTURES TO DATE:  Blood  Urine (pending)    Code status: Full code.  SUP: Pepcid.  DVT prophylaxis: lovenox  Jonathon Law (Child)  [phone] (Mobile)     Care Plan discussed with: Patient/Family and Nurse      I personally spent 40 minutes of critical care time.  This is time spent at this critically ill patient's bedside actively involved in patient care as well as the coordination of care and 
      Hospitalist Progress Note    NAME:   Zeenat Rolle   : 1945   MRN: [id number]     Date/Time: 2024    Patient PCP: Lefty Segura MD      Assessment / Plan:      Shock  Lactic acidosis  Hyperkalemia, recurrent  Hypothermia  ?  Adrenal insufficiency  Recent syncope, discharged on late July  Initially admitted to the ICU, status post IV fluid and vasopressors.  Procalcitonin negative.  Lactic acid 5.4 on admission.  Blood cultures negative  Initially started on empiric Zosyn, discontinue as no clear source of infection  Blood pressure stable  Cortisol stimulation test equivocal.  Endocrinology consulted.  Started on Florinef  Weaned off midodrine  Weaned off Solu-Cortef     Bradycardia  Midodrine held  Not on AV blockers  Asymptomatic  Cardiology consulted     Anemia, normocytic  No active bleeding  Reticulocyte count 1.5, absolute reticulocyte 0.03  FOBT pending  UA negative for blood  Iron profile not suggestive of significant iron deficiency anemia  S/p 1 PRBC unit since admission.     GERD  Achalasia  PPI     Stenosis of left subclavian artery  Vertebral artery stenosis  Hyperlipidemia  Continue aspirin and statin    Medical Decision Making:   I personally reviewed labs: yes, as below   I personally reviewed imaging reports: yes, as below   Toxic drug monitoring:   Discussed case with: Pt, RN, d/w CM for placement,   Code Status: Full Code       Subjective:  Assessment / Plan:      No black or bloody bowel movement  Abdominal pain  Denies shortness of breath or chest pain  Denies lightheadedness and dizziness  Afebrile  Discussed with RN events overnight.       Objective:      VITALS:   Last 24hrs VS reviewed since prior progress note. Most recent are:  Patient Vitals for the past 24 hrs:   BP Temp Temp src Pulse Resp SpO2 Weight   24 1556 (!) 153/64 97.6 °F (36.4 °C) -- (!) 44 15 99 % --   24 1212 (!) 134/55 97.4 °F (36.3 °C) Oral (!) 48 11 100 % --   24 1100 -- -- -- (!) 48 
      Hospitalist Progress Note    NAME:   Zeenat Rolle   : 1945   MRN: [id number]     Date/Time: 2024    Patient PCP: Lefty Segura MD      Assessment / Plan:      Shock  Lactic acidosis  Hyperkalemia, recurrent, 6.3  Hypothermia  ?  Adrenal insufficiency  Recent syncope, discharged on late July  Initially admitted to the ICU, status post IV fluid and vasopressors.  Procalcitonin negative.  Lactic acid 5.4 on admission.  Blood cultures negative  Initially started on empiric Zosyn, discontinue as no clear source of infection  Blood pressure stable  Cortisol stimulation test equivocal.  Endocrinology consulted.  Started on Florinef, as per Endo \"biochemical work-up would argue against adrenal insufficiency\"  Weaned off midodrine  Weaned off Solu-Cortef    Bradycardia  Midodrine held  Not on AV blockers  Asymptomatic  Cardiology consulted    Anemia, normocytic  No active bleeding  Reticulocyte count 1.5, absolute reticulocyte 0.03  FOBT pending  UA negative for blood  Iron profile not suggestive of significant iron deficiency anemia  S/p 1 PRBC unit since admission.    GERD  Achalasia  PPI    Stenosis of left subclavian artery  Vertebral artery stenosis  Hyperlipidemia  Continue aspirin and statin    Medical Decision Making:   I personally reviewed labs: yes, as below   I personally reviewed imaging reports: yes, as below   Toxic drug monitoring:   Discussed case with: Pt, RN, d/w CM for placement,   Code Status: Full Code       Subjective:  Assessment / Plan:      Patient denies shortness of breath  Denies chest pain  No reported dizziness or lightheadedness on ambulation  Patient is ambulatory and independently walks back and forth to the bathroom  Afebrile  No reported diarrhea  Discussed with RN events overnight.       Objective:      VITALS:   Last 24hrs VS reviewed since prior progress note. Most recent are:  Patient Vitals for the past 24 hrs:   BP Temp Temp src Pulse Resp SpO2 Weight   24 
0150   TRANSFER - IN REPORT:    Verbal report received from CRISTÓBAL Bills on Zeenat Rolle  being received from CCU for routine progression of patient care      Report consisted of patient's Situation, Background, Assessment and   Recommendations(SBAR).     Information from the following report(s) Nurse Handoff Report, MAR, and Cardiac Rhythm    was reviewed with the receiving nurse.    Opportunity for questions and clarification was provided.      Assessment completed upon patient's arrival to unit and care assumed.    
0522  NP Karthik faye served regarding Hgb level. Hgb 6.6 from AM labs.     0550 New lab orders given.     0620 Type and cross and CBC labs drawn and sent to lab.    0630 Pt ambulated to the bathroom, voided, and returned to bed. Ambulation tolerated fairly well.   
0700: Bedside and Verbal shift change report given to Vanita Brown RN   (oncoming nurse) by CRISTÓBAL Monet (offgoing nurse). Report included the following information Nurse Handoff Report, Index, Adult Overview, Intake/Output, MAR, Recent Results, and Cardiac Rhythm Sinus Diogenes with PAC's, 1st degree AV Block .     End of Shift Note    Bedside shift change report given to CRISTÓBAL Mcginnis (oncoming nurse) by Vanita Brown RN (offgoing nurse).  Report included the following information SBAR, Kardex, Intake/Output, MAR, Recent Results, and Cardiac Rhythm SB-1st degree block with PAC's.    Shift worked:  7a-7p     Shift summary and any significant changes:     Blood transfusion complete. Patient tolerated well.     Concerns for physician to address:  none     Zone phone for oncoming shift:   [phone]       Activity:     Number times ambulated in hallways past shift: 0  Number of times OOB to chair past shift: 1    Cardiac:   Cardiac Monitoring: Yes           Access:  Current line(s): PIV     Genitourinary:   Urinary status: voiding    Respiratory:      Chronic home O2 use?: NO  Incentive spirometer at bedside: NO       GI:     Current diet:  ADULT DIET; Regular  Passing flatus: YES  Tolerating current diet: YES       Pain Management:   Patient states pain is manageable on current regimen: YES    Skin:     Interventions: increase time out of bed    Patient Safety:  Fall Score:    Interventions: bed/chair alarm, gripper socks, pt to call before getting OOB, and stay with me (per policy)       Length of Stay:  Expected LOS: 7  Actual LOS: 2      Vanita Brown RN                            
1900h- Bedside and Verbal shift change report given by Tierney AMBROCIO (offgoing nurse). Report included the following information Nurse Handoff Report, Adult Overview, Intake/Output, MAR, Cardiac Rhythm sinus rhythm, Alarm Parameters, Quality Measures, and Neuro Assessment.     - pt. Alert, oriented x4, no complain of discomfort and dyspnea. On room air; saturating well 98%. With ongoing norepinephrine at 3 mcg/min, to keep MAP goal 60 mmhg, SBP- 80 mmhg as per AM nurse.    1945h- BP 79/50mmhg; increased levophed at 4 mcg/min.    2000h- shift assessment done; see flowsheet.    2049h- MAP 76mmhg; levophed at 3 mcg/min.    2105h- MAP- 87mmhg; levophed at 2 mcg/min.    2151h- MAP- 73mmhg; levophed at 1 mcg/min.    2200h- MAP 59 mmhg, increase levophed at 2mcg/min.    0000h- reassessment done; no changed.    0238h- MAP- 75 mmhg;  levophed at 1 mcg/min.    0324h- BP- 80/49mmhg; levophed at 2 mcg/min.    0400h- reassessment done; no changed.  - blood drawn for routine labs.    0700h- awaiting for verification of lokelma meds; S.K - 4.6.  Still with levophed at 2 mcg/min. Bedside and Verbal shift change report given to Tierney AMBROCIO. Report included the following information Nurse Handoff Report, Adult Overview, Recent Results, Cardiac Rhythm Sinus rhythm , Alarm Parameters, Quality Measures, and Neuro Assessment.       
1915: Bedside and Verbal shift change report given to CRISTÓBAL Bills (oncoming nurse) by CRISTÓBAL Monet (offgoing nurse). Report included the following information Nurse Handoff Report, Adult Overview, Intake/Output, MAR, Recent Results, Cardiac Rhythm Sinus Bradycardia, and Alarm Parameters.     2000: Shift assessment done, see flowsheet for details. Temp 97.4, warm blanket applied.    0000: Reassessment done, no change to previous assessment.    0122: TRANSFER - OUT REPORT:    Verbal report given to CRISTÓBAL Monet(name) on Zeenat Rolle  being transferred to [facility] (unit) for routine progression of patient care       Report consisted of patient’s Situation, Background, Assessment and   Recommendations(SBAR).     Information from the following report(s) Nurse Handoff Report, ED SBAR, Surgery Report, Intake/Output, MAR, Recent Results, Cardiac Rhythm Sinus Bradycardia, and Alarm Parameters was reviewed with the receiving nurse.    Opportunity for questions and clarification was provided.      Patient transported with:   Monitor  Registered Nurse                   
Attempted to schedule PCP hospital follow up appointment. Unable to reach anyone, unable to leave voicemail. [facility] placed [facility] information AVS for patient resource.  Pending patient discharge. Heydi Wright, Care Management Assistant   
Attended CCU Interdisciplinary rounds. Patient care was discussed.     Una De Los Santos  [facility]  Spiritual Health Services  Paging Service: [phone] (SCOTT)     
Bedside shift change report given to Deedee RN (oncoming nurse) by Guicho RN (offgoing nurse). Report included the following information Nurse Handoff Report, ED Encounter Summary, Intake/Output, MAR, Recent Results, Cardiac Rhythm SB, and Neuro Assessment.    
End of Shift Note    Bedside shift change report given to Guicho Vicente (oncoming nurse) by Blessed Loree RN (offgoing nurse).  Report included the following information SBAR, Kardex, ED Summary, Cardiac Rhythm sinus bradycardia, and Alarm Parameters     Shift worked: 7p-7a     Shift summary and any significant changes:     Patient alert and oriented to self,place,time and situation denies having pain at this time.patient starting the night with a low core body temperature and bear hugger was initiated was able to get patient's body temperature within normal ranges.     Concerns for physician to address:  None     Zone phone for oncoming shift:   [phone]       Activity:     Number times ambulated in hallways past shift: 0  Number of times OOB to chair past shift: 1    Cardiac:   Cardiac Monitoring: Yes           Access:  Current line(s): PIV     Genitourinary:   Urinary status: voiding    Respiratory:      Chronic home O2 use?: NO  Incentive spirometer at bedside: NO       GI:     Current diet:  ADULT DIET; Regular  Passing flatus: YES  Tolerating current diet: YES       Pain Management:   Patient states pain is manageable on current regimen: YES    Skin:     Interventions: increase time out of bed    Patient Safety:  Fall Score:    Interventions: bed/chair alarm       Length of Stay:  Expected LOS: 7  Actual LOS: 3      Blessed Loree RN                            
Nursing contacted Nocturnist/cross cover provider via non-urgent messaging system [employer] and notified patient lab result of hgb 6.6 down from 7.1, no active bleeding reported, a+ox4, no blood consent done as of yet this admit. No other concerns reported. No acute distress reported. No other information provided by nurse. VSS. Ordered t&c and repeat cbc- results pending, will need blood consent should hgb remain below 7.0. Will defer further evaluation/management to the day shift primary attending care team. Patient denies any further complaints or concerns. Nursing to notify Hospitalist for further/continued concerns. Will remain available overnight for further concerns if nursing/patient needs. Please note, there are RRT systems in this hospital in place that if nursing has acute or critical patient condition change or concern, this is to help facilitate and notify that patient needs immediate bedside evaluation by a provider.     Non-billable note.     
OCCUPATIONAL THERAPY EVALUATION/DISCHARGE  Patient: Zeenat Rolle (79 y.o. female)  Date: 8/6/2024  Primary Diagnosis: Hyperkalemia [E87.5]  Shock (HCC) [R57.9]  Anemia, unspecified type [D64.9]  Chronic kidney disease, unspecified CKD stage [N18.9]         Precautions:                    ASSESSMENT :  Based on the objective data below, the patient presents on day 5 of admission for hyperkalemia and shock requiring ICU admission and pressors initially now transferred to floor and off pressor support. At baseline she is active and independent living alone and walking outdoors daily. Pt with HR ranging from high 40s to mid 60s today with activity. SBP downtrending with activity patient denies symptoms of OH. She mobilizes independently without need for AD and is completing self care at SPV - independent level with good safety awareness and insight during transitions to move slowly and avoid abrupt positional changes (reports this is how she fell back in July). Vitals documented below. No further acute OT needs identified at this time; please re-consult if change in status or further needs arise. Recommending home with peripheral family assistance, daughter checks in daily, and HHOT for home safety evaluation when medically ready.      Patient Vitals for the past 6 hrs:   Pulse BP Patient Position   08/06/24 0906 56 113/67 Standing   08/06/24 0900 (!) 46 (!) 124/56 Standing   08/06/24 0858 53 134/63 Up in chair;Sitting         Functional Outcome Measure:  The patient scored 23/24 on the [facility] outcome measure which is indicative of increased likelihood for need for SNF/IPR at discharge.      Further skilled acute occupational therapy is not indicated at this time.     PLAN :  Recommend with staff: OOB to recliner as tolerated. OOB to BSC/bathroom with assist x1 for safety while admitted    Recommendation for discharge: (in order for the patient to meet his/her long term goals): Therapy 2x a week in the home for home 
Patient resting.  
Pharmacy Antimicrobial Kinetic Dosing    Indication for Antimicrobials: Pneumonia (CAP)     Current Regimen of Each Antimicrobial:  Vancomycin pharmacy consult; Start Date ; Day # 1  Zosyn 3.375gm q 8; Start 2; Day #1    Previous Antimicrobial Therapy:  Vancomycin 1500mg x 1 in ED  Zosyn 4.5gm x 1 in ED      Goal Level: Vancomycin random level < 20     Date Dose & Interval Measured (mcg/mL) Predicted AUC                       Significant Cultures:       Labs:  Recent Labs     Units 24  1834   CREATININE MG/DL 1.51*   BUN MG/DL 38*   WBC K/uL 6.9     Temp (24hrs), Av.2 °F (36.2 °C), Min:97.2 °F (36.2 °C), Max:97.2 °F (36.2 °C)      Conditions for Dosing Consideration:  CKD     Creatinine Clearance (mL/min): Estimated Creatinine Clearance: 23 mL/min (A) (based on SCr of 1.51 mg/dL (H)).       Impression/Plan:   Vancomycin 1500mg x 1 loading dose, then dose by random levels given age and renal fxn - will order level for ~12pm today   MRSA swab ordered   Continue zosyn - renal function worsens may need to adjust to q12   Antimicrobial stop date 7 days     Pharmacy will follow daily and adjust medications as appropriate for renal function and/or serum levels.    Thank you,  Meredith Danielle [facility]    
Spiritual Care Assessment/Progress Note  [facility]    Name: Zeenat Rolle MRN: [id number]    Age: 79 y.o.     Sex: female   Language: English     Date: 8/2/2024            Total Time Calculated: 10 min              Spiritual Assessment begun in [facility] CRITICAL CARE  Service Provided For: Patient and family together     Encounter Overview/Reason: Initial Encounter    Spiritual beliefs:      [] Involved in a harsh tradition/spiritual practice:      [] Supported by a harsh community:      [] Claims no spiritual orientation:      [] Seeking spiritual identity:           [] Adheres to an individual form of spirituality:      [x] Not able to assess:                Identified resources for coping and support system:           [] Prayer                  [] Devotional reading               [] Music                  [] Guided Imagery     [] Pet visits                                        [] Other: (COMMENT)     Specific area/focus of visit   Encounter:    Crisis:    Spiritual/Emotional needs:    Ritual, Rites and Sacraments:    Grief, Loss, and Adjustments:    Ethics/Mediation:    Behavioral Health:    Palliative Care:    Advance Care Planning:      Plan/Referrals: Other (Comment) (Please send additional spiritual health referrals as needed.)    Narrative:  visited patient and patient support person in the CCU while rounding.  offered spiritual support and patient politely indicated that she was not in need of this at this time. Advised of  availability.   Una De Los Santos  Intern [facility]  Spiritual Health Services  Paging Service: [phone] (SCOTT)          
[facility]     Progress Note      8/5/2024 6:32 AM  NAME: Zeenat Rolle   MRN:  [id number]   Admit Diagnosis: Hyperkalemia [E87.5]  Shock (HCC) [R57.9]  Anemia, unspecified type [D64.9]  Chronic kidney disease, unspecified CKD stage [N18.9]                Assessment:       - Increased potassium unclear  - Anemia unclear  - Low bp     - No  hx of CAD  - Echo 8/2024 nl EF  - Sinus bradycardia  - Hypotension unclear  - Dyslipidemia  - CKD stage 3, hyperkalemia unclear, Dr. 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  - Dilated esophogus/achalasia  - Quit tobacco  - No Etoh, no drugs  , lives alone, 3 kids, walks with walking sticks, has a cane                     Plan:        - No chest pain, negative enzymes during last hospital stay  - Euvolemic, echo with normal EF  - Sinus bradycardia on tele     - Possible adrenal insuficiency, on stress dose steroids  - Receiving transfusion for unclear anemia, not iron deficient     - Cont asa  - Cont added florinef decrease to 0.1 qd  - Decreased added midodrine 5mg TID, follow bp, likely will not need at discharge  - Likely change IV hydrocortisone to PO, discussed with renal, likely component of adrenal deficiency  - Cont atorvastatin    On abx ?stop    Unclear presentation/diagnosis  Did have recent syncope with lasix, likely more sensitive to bp shifts given subclavian stenosis.  BP now too high, decreasing meds, hr appears to be improving after transfusion and decreasing bp.  PPM not clearly indicated at this time.    Stable from cardiac perspective    Discussed with renal.         [x]       High complexity decision making was performed in this patient at high risk for decompensation with multiple organ involvement.    We discussed the expected course, resolution and complications of the 
[facility]     Progress Note      8/6/2024 6:46 AM  NAME: Zeenat Rolle   MRN:  [id number]   Admit Diagnosis: Hyperkalemia [E87.5]  Shock (HCC) [R57.9]  Anemia, unspecified type [D64.9]  Chronic kidney disease, unspecified CKD stage [N18.9]                Assessment:       - Increased potassium unclear  - Anemia unclear  - Low bp     - No  hx of CAD  - Echo 8/2024 nl EF  - Sinus bradycardia  - Hypotension unclear  - Dyslipidemia  - CKD stage 3, hyperkalemia unclear, Dr. 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  - Dilated esophogus/achalasia  - Quit tobacco  - No Etoh, no drugs  , lives alone, 3 kids, walks with walking sticks, has a cane                     Plan:        - No chest pain, negative enzymes during last hospital stay  - Euvolemic, echo with normal EF  - Sinus bradycardia on tele     - Possible adrenal insuficiency, on stress dose steroids  - Receiving transfusion for unclear anemia, not iron deficient     - Cont asa  - Cont added florinef decrease to 0.1 qd  - s/p IV hydrocortisone, likely needs po steroids per renal/endocrine  - Cont atorvastatin    On abx ?stop    Unclear presentation/diagnosis  Did have recent syncope with lasix, likely more sensitive to bp shifts given subclavian stenosis.  BP better, hr appears to be improving after transfusion and decreasing bp.  PPM not clearly indicated at this time.    Will arrange 48 hr holter as outpt  Stable for discharge from cardiac perspective         [x]       High complexity decision making was performed in this patient at high risk for decompensation with multiple organ involvement.    We discussed the expected course, resolution and complications of the diagnoses in detail.  Medication risk, benefits, costs, interactions, and alternatives were discussed as indicated.  I 
tablet 16 g  4 tablet Oral PRN    dextrose bolus 10% 125 mL  125 mL IntraVENous PRN    Or    dextrose bolus 10% 250 mL  250 mL IntraVENous PRN    glucagon injection 1 mg  1 mg SubCUTAneous PRN    dextrose 10 % infusion   IntraVENous Continuous PRN     
tablet 4 mg  4 mg Oral Q8H PRN    Or    ondansetron (ZOFRAN) injection 4 mg  4 mg IntraVENous Q6H PRN    polyethylene glycol (GLYCOLAX) packet 17 g  17 g Oral Daily PRN    acetaminophen (TYLENOL) tablet 650 mg  650 mg Oral Q6H PRN    Or    acetaminophen (TYLENOL) suppository 650 mg  650 mg Rectal Q6H PRN    hydrocortisone sodium succinate PF (SOLU-CORTEF) injection 100 mg  100 mg IntraVENous Q8H    midodrine (PROAMATINE) tablet 5 mg  5 mg Oral TID WC    glucose chewable tablet 16 g  4 tablet Oral PRN    dextrose bolus 10% 125 mL  125 mL IntraVENous PRN    Or    dextrose bolus 10% 250 mL  250 mL IntraVENous PRN    glucagon injection 1 mg  1 mg SubCUTAneous PRN    dextrose 10 % infusion   IntraVENous Continuous PRN     
Retrospective, Observational Study. Arch Rehabil Res Clin Transl. 2022 Jul 16;4(3):845948. doi: 10.1016/j.arrct.2022.217933. PMID: 13927157; PMCID: OMJ4311389.  4. Suzy ALVARENGA, Sylwia S, Soraida W, Alyssa CASTILLO. AM-PAC Short Forms Manual 4.0. Revised 2/2020.                                                                                                                                                                                                                              Pain Rating:  No verbalization of pain    Activity Tolerance:   Good    After treatment:   Patient left in no apparent distress sitting up in chair, Call bell within reach, and Bed/ chair alarm activated      COMMUNICATION/EDUCATION:   The patient's plan of care was discussed with: registered nurse    Patient Education  Education Given To: Patient  Education Provided: Role of Therapy;Fall Prevention Strategies  Education Provided Comments: Home safety, progression of activity/ambulation  Education Method: Verbal  Barriers to Learning: None  Education Outcome: Verbalized understanding    Thank you for this referral.  Yobani Littlejohn, PT, DPT  Minutes: 15      Physical Therapy Evaluation Charge Determination   History Examination Presentation Decision-Making   LOW Complexity : Zero comorbidities / personal factors that will impact the outcome / POC LOW Complexity : 1-2 Standardized tests and measures addressing body structure, function, activity limitation and / or participation in recreation  LOW Complexity : Stable, uncomplicated  AM-PAC  MEDIUM     Based on the above components, the patient evaluation is determined to be of the following complexity level: Low

## 2024-08-07 ENCOUNTER — TELEPHONE (OUTPATIENT)
Age: 79
End: 2024-08-07

## 2024-08-07 ENCOUNTER — PATIENT MESSAGE (OUTPATIENT)
Age: 79
End: 2024-08-07

## 2024-08-07 NOTE — TELEPHONE ENCOUNTER
----- Message from Yasmani Thompson sent at 8/7/2024 10:14 AM EDT -----  Regarding: ECC Appointment Request  ECC Appointment Request    Patient needs appointment for ECC Appointment Type: Hospital Follow Up.    Patient Requested Dates(s): as soon as possible  Patient Requested Time: Morning   Provider Name: Lefty Segura        Reason for Appointment Request: Other HOSPITAL FOLLOW UP  --------------------------------------------------------------------------------------------------------------------------    Relationship to Patient: Self     Call Back Information: OK to leave message on voicemail  Preferred Call Back Number: Phone [phone]/ [phone]       Patient wanted request a hospital follow up appointment because she get discharge to the hospital today.

## 2024-08-07 NOTE — TELEPHONE ENCOUNTER
----- Message from Yasmani Thompson sent at 8/7/2024 10:14 AM EDT -----  Regarding: ECC Appointment Request  ECC Appointment Request    Patient needs appointment for ECC Appointment Type: Hospital Follow Up.    Patient Requested Dates(s): as soon as possible  Patient Requested Time: Morning   Provider Name: Lefty Segura        Reason for Appointment Request: Other HOSPITAL FOLLOW UP  --------------------------------------------------------------------------------------------------------------------------    Relationship to Patient: Self     Call Back Information: OK to leave message on voicemail  Preferred Call Back Number: Phone [phone]/ [phone]       Patient wanted request a hospital follow up appointment because she get discharge to the hospital today.

## 2024-08-07 NOTE — TELEPHONE ENCOUNTER
Care Transitions Initial Follow Up Call    Outreach made within 2 business days of discharge: Yes    Patient: Zeenat Rolle Patient : 1945   MRN: [id number]  Reason for Admission: SHOCK  Discharge Date: 24       Spoke with: PATIENT'S DAUGHTER    Discharge department/facility: [facility] Interactive Patient Contact:  Was patient able to fill all prescriptions: Yes  Was patient instructed to bring all medications to the follow-up visit: Yes  Is patient taking all medications as directed in the discharge summary? Yes  Does patient understand their discharge instructions: Yes  Does patient have questions or concerns that need addressed prior to 7-14 day follow up office visit: no    Additional needs identified to be addressed with provider  No needs identified             Scheduled appointment with PCP within 7-14 days    Follow Up  Future Appointments   Date Time Provider Department Center   2024  8:40 AM Lefty Segura MD [facility] DEP   9/3/2024 10:20 AM Lefty Segura MD [facility] DEP   2024  9:00 AM Lefty Segura MD [facility] DEP       Jens Fernando MA

## 2024-08-07 NOTE — TELEPHONE ENCOUNTER
Patient daughter Jonathon Lentz For hospital follow up tomorrow morning at 8:40 a.m. if unable to make it patient daughter will send a [employer] message and we will re evaluate schedule for work in appt. No available appts before 9/8/24.

## 2024-08-08 ENCOUNTER — TELEPHONE (OUTPATIENT)
Age: 79
End: 2024-08-08

## 2024-08-08 NOTE — TELEPHONE ENCOUNTER
Care Transitions Initial Follow Up Call    Outreach made within 2 business days of discharge: Yes    Patient: Zeenat Rolle Patient : 1945   MRN: [id number]  Reason for Admission: shock  Discharge Date: 24       Spoke with: daughter (chito)    Discharge department/facility: [facility] Interactive Patient Contact:  Was patient able to fill all prescriptions: Yes  Was patient instructed to bring all medications to the follow-up visit: Yes  Is patient taking all medications as directed in the discharge summary? Yes  Does patient understand their discharge instructions: Yes  Does patient have questions or concerns that need addressed prior to 7-14 day follow up office visit: no    Additional needs identified to be addressed with provider  No needs identified             Scheduled appointment with PCP within 7-14 days    Follow Up  Future Appointments   Date Time Provider Department Center   8/15/2024  8:40 AM Lefty Segura MD [facility] DEP   9/3/2024 10:20 AM Lefty Segura MD [facility] DEP   2024  9:00 AM Lefty Segura MD [facility] DEP       Xin Gibbs

## 2024-08-15 ENCOUNTER — OFFICE VISIT (OUTPATIENT)
Age: 79
End: 2024-08-15
Payer: MEDICARE

## 2024-08-15 VITALS
TEMPERATURE: 97.7 F | RESPIRATION RATE: 16 BRPM | HEART RATE: 63 BPM | OXYGEN SATURATION: 99 % | DIASTOLIC BLOOD PRESSURE: 48 MMHG | WEIGHT: 133.4 LBS | HEIGHT: 60 IN | BODY MASS INDEX: 26.19 KG/M2 | SYSTOLIC BLOOD PRESSURE: 119 MMHG

## 2024-08-15 DIAGNOSIS — E86.1 HYPOTENSION DUE TO HYPOVOLEMIA: Primary | ICD-10-CM

## 2024-08-15 DIAGNOSIS — R60.9 EDEMA, UNSPECIFIED TYPE: ICD-10-CM

## 2024-08-15 LAB
ALBUMIN SERPL-MCNC: 3.1 G/DL (ref 3.5–5)
ALBUMIN/GLOB SERPL: 0.9 (ref 1.1–2.2)
ALP SERPL-CCNC: 262 U/L (ref 45–117)
ALT SERPL-CCNC: 65 U/L (ref 12–78)
ANION GAP SERPL CALC-SCNC: 1 MMOL/L (ref 5–15)
AST SERPL-CCNC: 36 U/L (ref 15–37)
BASOPHILS # BLD: 0.1 K/UL (ref 0–0.1)
BASOPHILS NFR BLD: 1 % (ref 0–1)
BILIRUB SERPL-MCNC: 0.2 MG/DL (ref 0.2–1)
BUN SERPL-MCNC: 14 MG/DL (ref 6–20)
BUN/CREAT SERPL: 13 (ref 12–20)
CALCIUM SERPL-MCNC: 9.1 MG/DL (ref 8.5–10.1)
CHLORIDE SERPL-SCNC: 113 MMOL/L (ref 97–108)
CO2 SERPL-SCNC: 30 MMOL/L (ref 21–32)
CREAT SERPL-MCNC: 1.06 MG/DL (ref 0.55–1.02)
DIFFERENTIAL METHOD BLD: ABNORMAL
EOSINOPHIL # BLD: 0.2 K/UL (ref 0–0.4)
EOSINOPHIL NFR BLD: 2 % (ref 0–7)
ERYTHROCYTE [DISTWIDTH] IN BLOOD BY AUTOMATED COUNT: 16.2 % (ref 11.5–14.5)
GLOBULIN SER CALC-MCNC: 3.4 G/DL (ref 2–4)
GLUCOSE SERPL-MCNC: 79 MG/DL (ref 65–100)
HCT VFR BLD AUTO: 27.3 % (ref 35–47)
HGB BLD-MCNC: 8.8 G/DL (ref 11.5–16)
IMM GRANULOCYTES # BLD AUTO: 0 K/UL (ref 0–0.04)
IMM GRANULOCYTES NFR BLD AUTO: 0 % (ref 0–0.5)
LYMPHOCYTES # BLD: 1.7 K/UL (ref 0.8–3.5)
LYMPHOCYTES NFR BLD: 19 % (ref 12–49)
MCH RBC QN AUTO: 29.8 PG (ref 26–34)
MCHC RBC AUTO-ENTMCNC: 32.2 G/DL (ref 30–36.5)
MCV RBC AUTO: 92.5 FL (ref 80–99)
MONOCYTES # BLD: 0.8 K/UL (ref 0–1)
MONOCYTES NFR BLD: 9 % (ref 5–13)
NEUTS SEG # BLD: 6.3 K/UL (ref 1.8–8)
NEUTS SEG NFR BLD: 69 % (ref 32–75)
NRBC # BLD: 0 K/UL (ref 0–0.01)
NRBC BLD-RTO: 0 PER 100 WBC
PLATELET # BLD AUTO: 219 K/UL (ref 150–400)
PMV BLD AUTO: 11.7 FL (ref 8.9–12.9)
POTASSIUM SERPL-SCNC: 4.4 MMOL/L (ref 3.5–5.1)
PROT SERPL-MCNC: 6.5 G/DL (ref 6.4–8.2)
RBC # BLD AUTO: 2.95 M/UL (ref 3.8–5.2)
SODIUM SERPL-SCNC: 144 MMOL/L (ref 136–145)
WBC # BLD AUTO: 9 K/UL (ref 3.6–11)

## 2024-08-15 PROCEDURE — G8419 CALC BMI OUT NRM PARAM NOF/U: HCPCS | Performed by: FAMILY MEDICINE

## 2024-08-15 PROCEDURE — 99213 OFFICE O/P EST LOW 20 MIN: CPT | Performed by: FAMILY MEDICINE

## 2024-08-15 PROCEDURE — 1111F DSCHRG MED/CURRENT MED MERGE: CPT | Performed by: FAMILY MEDICINE

## 2024-08-15 PROCEDURE — 1036F TOBACCO NON-USER: CPT | Performed by: FAMILY MEDICINE

## 2024-08-15 PROCEDURE — 1123F ACP DISCUSS/DSCN MKR DOCD: CPT | Performed by: FAMILY MEDICINE

## 2024-08-15 PROCEDURE — G8427 DOCREV CUR MEDS BY ELIG CLIN: HCPCS | Performed by: FAMILY MEDICINE

## 2024-08-15 PROCEDURE — G8399 PT W/DXA RESULTS DOCUMENT: HCPCS | Performed by: FAMILY MEDICINE

## 2024-08-15 PROCEDURE — 1090F PRES/ABSN URINE INCON ASSESS: CPT | Performed by: FAMILY MEDICINE

## 2024-08-15 RX ORDER — FLUDROCORTISONE ACETATE 0.1 MG/1
0.1 TABLET ORAL DAILY
Qty: 30 TABLET | Refills: 0
Start: 2024-08-15 | End: 2024-08-15 | Stop reason: SINTOL

## 2024-08-15 NOTE — PROGRESS NOTES
Zeenat Rolle (:  1945) is a 79 y.o. female,Established patient, here for evaluation of the following chief complaint(s):  Follow-Up from Hospital         Assessment & Plan  Hypotension due to hypovolemia       Orders:    CBC with Auto Differential; Future    Comprehensive Metabolic Panel; Future    Comprehensive Metabolic Panel    CBC with Auto Differential    Edema, unspecified type   Pt to dc florinef until rechecked by me in 3 weeks.            No follow-ups on file.       Subjective   HPI pt was in hospital at [facility] with hyperkalemia, was seen by Dr. Arvizu who sent her to ER. Had several tests done, was in hospital for 5-6 days. Had problems with low blood pressure, had medicines adjusted, was given iv fluids. Pt was asymptomatic the whole time.   Had also passed out at home and was admitted from  to July to . Felt to be dehydrated from Lasix at that time. Feels good now.  Currently on florinef 0.1 mg daily, aspirin 81 mg daily, atorvastatin 40 mg daily. Has had progressive edema since starting florinef 9 days ago. Has appt with Dr. Arvizu tomorrow.     Review of Systems       Objective   Physical Exam  Cardiovascular:      Rate and Rhythm: Normal rate and regular rhythm.      Heart sounds: Normal heart sounds. No murmur heard.  Pulmonary:      Effort: Pulmonary effort is normal.      Breath sounds: Normal breath sounds.   Abdominal:      General: Abdomen is flat. Bowel sounds are normal.      Palpations: Abdomen is soft.   Musculoskeletal:      Right lower leg: No edema.      Left lower leg: No edema.      Comments: 3+ edema lower extremities bilaterally                  An electronic signature was used to authenticate this note.    --Lefty Segura MD

## 2024-08-15 NOTE — PROGRESS NOTES
Chief Complaint   Patient presents with    Follow-Up from Hospital       \"Have you been to the ER, urgent care clinic since your last visit?  Hospitalized since your last visit?\"      24-24 - [facility]    “Have you seen or consulted any other health care providers outside of [facility] since your last visit?”    NO            Click Here for Release of Records Request       There were no vitals filed for this visit.   Health Maintenance Due   Topic Date Due    Shingles vaccine (1 of 2) Never done    Respiratory Syncytial Virus (RSV) Pregnant or age 60 yrs+ (1 - 1-dose 60+ series) Never done    COVID-19 Vaccine (2023- season) 2023    DTaP/Tdap/Td vaccine (2 - Td or Tdap) 2024    Flu vaccine (1) 2024        The patient, Zeenat Rolle, identity was verified by name and .

## 2024-08-16 LAB — ACTH PLAS-MCNC: 19.1 PG/ML (ref 7.2–63.3)

## 2024-08-19 ENCOUNTER — TELEPHONE (OUTPATIENT)
Age: 79
End: 2024-08-19

## 2024-08-19 RX ORDER — ATORVASTATIN CALCIUM 40 MG/1
40 TABLET, FILM COATED ORAL DAILY
Qty: 90 TABLET | Refills: 3 | Status: SHIPPED | OUTPATIENT
Start: 2024-08-19

## 2024-08-19 NOTE — TELEPHONE ENCOUNTER
Pc with pt. Edema slightly better. Cmp looks good. Has appt 9-3- to hold off on florinef until then.

## 2024-08-29 ENCOUNTER — HOSPITAL ENCOUNTER (OUTPATIENT)
Facility: HOSPITAL | Age: 79
Discharge: HOME | End: 2024-08-29
Attending: INTERNAL MEDICINE
Payer: MEDICARE

## 2024-08-29 DIAGNOSIS — D47.2 MONOCLONAL PARAPROTEINEMIA: ICD-10-CM

## 2024-08-29 PROCEDURE — 77075 RADEX OSSEOUS SURVEY COMPL: CPT

## 2024-09-03 ENCOUNTER — OFFICE VISIT (OUTPATIENT)
Age: 79
End: 2024-09-03

## 2024-09-03 VITALS
SYSTOLIC BLOOD PRESSURE: 77 MMHG | DIASTOLIC BLOOD PRESSURE: 44 MMHG | HEART RATE: 62 BPM | OXYGEN SATURATION: 100 % | BODY MASS INDEX: 23.51 KG/M2 | RESPIRATION RATE: 16 BRPM | WEIGHT: 120.4 LBS | TEMPERATURE: 97.8 F

## 2024-09-03 DIAGNOSIS — D64.9 ANEMIA, UNSPECIFIED TYPE: ICD-10-CM

## 2024-09-03 DIAGNOSIS — R60.0 LOCALIZED EDEMA: Primary | ICD-10-CM

## 2024-09-03 LAB
ALBUMIN SERPL-MCNC: 4 G/DL (ref 3.5–5)
ALBUMIN/GLOB SERPL: 1 (ref 1.1–2.2)
ALP SERPL-CCNC: 224 U/L (ref 45–117)
ALT SERPL-CCNC: 42 U/L (ref 12–78)
ANION GAP SERPL CALC-SCNC: 2 MMOL/L (ref 5–15)
AST SERPL-CCNC: 39 U/L (ref 15–37)
BASOPHILS # BLD: 0.1 K/UL (ref 0–0.1)
BASOPHILS NFR BLD: 1 % (ref 0–1)
BILIRUB SERPL-MCNC: 0.2 MG/DL (ref 0.2–1)
BUN SERPL-MCNC: 25 MG/DL (ref 6–20)
BUN/CREAT SERPL: 21 (ref 12–20)
CALCIUM SERPL-MCNC: 9.9 MG/DL (ref 8.5–10.1)
CHLORIDE SERPL-SCNC: 108 MMOL/L (ref 97–108)
CO2 SERPL-SCNC: 29 MMOL/L (ref 21–32)
COMMENT:: NORMAL
CREAT SERPL-MCNC: 1.21 MG/DL (ref 0.55–1.02)
DIFFERENTIAL METHOD BLD: ABNORMAL
EOSINOPHIL # BLD: 0.1 K/UL (ref 0–0.4)
EOSINOPHIL NFR BLD: 2 % (ref 0–7)
ERYTHROCYTE [DISTWIDTH] IN BLOOD BY AUTOMATED COUNT: 16.4 % (ref 11.5–14.5)
FERRITIN SERPL-MCNC: 211 NG/ML (ref 8–252)
GLOBULIN SER CALC-MCNC: 3.9 G/DL (ref 2–4)
GLUCOSE SERPL-MCNC: 79 MG/DL (ref 65–100)
HCT VFR BLD AUTO: 32.9 % (ref 35–47)
HGB BLD-MCNC: 10.3 G/DL (ref 11.5–16)
IMM GRANULOCYTES # BLD AUTO: 0 K/UL (ref 0–0.04)
IMM GRANULOCYTES NFR BLD AUTO: 0 % (ref 0–0.5)
IRON SATN MFR SERPL: 23 % (ref 20–50)
IRON SERPL-MCNC: 76 UG/DL (ref 35–150)
LYMPHOCYTES # BLD: 1.8 K/UL (ref 0.8–3.5)
LYMPHOCYTES NFR BLD: 33 % (ref 12–49)
MCH RBC QN AUTO: 29.7 PG (ref 26–34)
MCHC RBC AUTO-ENTMCNC: 31.3 G/DL (ref 30–36.5)
MCV RBC AUTO: 94.8 FL (ref 80–99)
MONOCYTES # BLD: 0.4 K/UL (ref 0–1)
MONOCYTES NFR BLD: 8 % (ref 5–13)
NEUTS SEG # BLD: 3 K/UL (ref 1.8–8)
NEUTS SEG NFR BLD: 56 % (ref 32–75)
NRBC # BLD: 0 K/UL (ref 0–0.01)
NRBC BLD-RTO: 0 PER 100 WBC
PLATELET # BLD AUTO: 286 K/UL (ref 150–400)
PMV BLD AUTO: 12.3 FL (ref 8.9–12.9)
POTASSIUM SERPL-SCNC: 5 MMOL/L (ref 3.5–5.1)
PROT SERPL-MCNC: 7.9 G/DL (ref 6.4–8.2)
RBC # BLD AUTO: 3.47 M/UL (ref 3.8–5.2)
RETICS # AUTO: 0.04 M/UL (ref 0.02–0.08)
RETICS/RBC NFR AUTO: 1.3 % (ref 0.7–2.1)
SODIUM SERPL-SCNC: 139 MMOL/L (ref 136–145)
SPECIMEN HOLD: NORMAL
TIBC SERPL-MCNC: 328 UG/DL (ref 250–450)
VIT B12 SERPL-MCNC: 777 PG/ML (ref 193–986)
WBC # BLD AUTO: 5.4 K/UL (ref 3.6–11)

## 2024-09-03 PROCEDURE — 99213 OFFICE O/P EST LOW 20 MIN: CPT | Performed by: FAMILY MEDICINE

## 2024-09-03 PROCEDURE — 1123F ACP DISCUSS/DSCN MKR DOCD: CPT | Performed by: FAMILY MEDICINE

## 2024-09-03 NOTE — PROGRESS NOTES
Zeenat Rolle (:  1945) is a 79 y.o. female,Established patient, here for evaluation of the following chief complaint(s):  Follow-up (3 week per Dr. Segura)         Assessment & Plan  Localized edema    Overall markedly better. Will list under \allergies reaction pt had to florinef.     Orders:    Comprehensive Metabolic Panel; Future    Anemia, unspecified type       Orders:    CBC with Auto Differential; Future    Gammopathy Eval, SPEP/ROJELIO, IG Qt/FLC; Future    Iron and TIBC; Future    Vitamin B12; Future    Ferritin; Future    Reticulocytes; Future      No follow-ups on file.   Recheck in 3 months.     Subjective   HPI In for followup. We stopped her florinef 3 weeks ago due to severe edema. Swelling in legs has gone down. Not on any lasix. No dizziness, syncope, falls.     Review of Systems       Objective   Physical Exam  Cardiovascular:      Rate and Rhythm: Normal rate and regular rhythm.      Heart sounds: Normal heart sounds. No murmur heard.  Pulmonary:      Effort: Pulmonary effort is normal.      Breath sounds: Normal breath sounds.   Abdominal:      General: Abdomen is flat. Bowel sounds are normal.      Palpations: Abdomen is soft.   Musculoskeletal:      Right lower leg: No edema.      Left lower leg: No edema.                  An electronic signature was used to authenticate this note.    --Lefty Segura MD

## 2024-09-03 NOTE — PROGRESS NOTES
Chief Complaint   Patient presents with    Follow-up     3 week per Dr. Segura     Last weight 133 on 8/15/24 and today is 120.4      \"Have you been to the ER, urgent care clinic since your last visit?  Hospitalized since your last visit?\"    NO    “Have you seen or consulted any other health care providers outside of [facility] since your last visit?”    NO            Click Here for Release of Records Request       There were no vitals filed for this visit.   Health Maintenance Due   Topic Date Due    Shingles vaccine (1 of 2) Never done    Respiratory Syncytial Virus (RSV) Pregnant or age 60 yrs+ (1 - 1-dose 60+ series) Never done    DTaP/Tdap/Td vaccine (2 - Td or Tdap) 2024    Flu vaccine (1) 2024    COVID-19 Vaccine ( - - season) 2024        The patient, Zeenat Rolle, identity was verified by name and .

## 2024-09-06 LAB
ALBUMIN SERPL ELPH-MCNC: 3.7 G/DL (ref 2.9–4.4)
ALBUMIN/GLOB SERPL: 1 (ref 0.7–1.7)
ALPHA1 GLOB SERPL ELPH-MCNC: 0.3 G/DL (ref 0–0.4)
ALPHA2 GLOB SERPL ELPH-MCNC: 0.9 G/DL (ref 0.4–1)
B-GLOBULIN SERPL ELPH-MCNC: 1.2 G/DL (ref 0.7–1.3)
GAMMA GLOB SERPL ELPH-MCNC: 1.5 G/DL (ref 0.4–1.8)
GLOBULIN SER-MCNC: 4 G/DL (ref 2.2–3.9)
IGA SERPL-MCNC: 233 MG/DL (ref 64–422)
IGG SERPL-MCNC: 1663 MG/DL (ref 586–1602)
IGM SERPL-MCNC: 63 MG/DL (ref 26–217)
INTERPRETATION SERPL IEP-IMP: ABNORMAL
KAPPA LC FREE SER-MCNC: 73.2 MG/L (ref 3.3–19.4)
KAPPA LC FREE/LAMBDA FREE SER: 2.93 (ref 0.26–1.65)
LAMBDA LC FREE SERPL-MCNC: 25 MG/L (ref 5.7–26.3)
M PROTEIN SERPL ELPH-MCNC: ABNORMAL G/DL
PROT SERPL-MCNC: 7.7 G/DL (ref 6–8.5)

## 2024-09-11 ENCOUNTER — TELEPHONE (OUTPATIENT)
Age: 79
End: 2024-09-11

## 2024-09-13 ENCOUNTER — CLINICAL DOCUMENTATION (OUTPATIENT)
Age: 79
End: 2024-09-13

## 2024-10-07 ENCOUNTER — CLINICAL DOCUMENTATION (OUTPATIENT)
Age: 79
End: 2024-10-07

## 2024-11-12 ENCOUNTER — OFFICE VISIT (OUTPATIENT)
Age: 79
End: 2024-11-12
Payer: MEDICARE

## 2024-11-12 VITALS
TEMPERATURE: 97.8 F | HEIGHT: 60 IN | BODY MASS INDEX: 23.64 KG/M2 | WEIGHT: 120.4 LBS | OXYGEN SATURATION: 98 % | DIASTOLIC BLOOD PRESSURE: 50 MMHG | SYSTOLIC BLOOD PRESSURE: 119 MMHG | HEART RATE: 51 BPM | RESPIRATION RATE: 16 BRPM

## 2024-11-12 DIAGNOSIS — E87.5 HYPERKALEMIA: ICD-10-CM

## 2024-11-12 DIAGNOSIS — I73.9 CLAUDICATION (HCC): ICD-10-CM

## 2024-11-12 DIAGNOSIS — D64.9 ANEMIA, UNSPECIFIED TYPE: ICD-10-CM

## 2024-11-12 DIAGNOSIS — E78.00 PURE HYPERCHOLESTEROLEMIA, UNSPECIFIED: Primary | ICD-10-CM

## 2024-11-12 DIAGNOSIS — E78.00 PURE HYPERCHOLESTEROLEMIA, UNSPECIFIED: ICD-10-CM

## 2024-11-12 LAB
ALBUMIN SERPL-MCNC: 3.5 G/DL (ref 3.5–5)
ALBUMIN/GLOB SERPL: 0.9 (ref 1.1–2.2)
ALP SERPL-CCNC: 192 U/L (ref 45–117)
ALT SERPL-CCNC: 40 U/L (ref 12–78)
ANION GAP SERPL CALC-SCNC: 3 MMOL/L (ref 2–12)
AST SERPL-CCNC: 31 U/L (ref 15–37)
BASOPHILS # BLD: 0 K/UL (ref 0–0.1)
BASOPHILS NFR BLD: 0 % (ref 0–1)
BILIRUB SERPL-MCNC: 0.2 MG/DL (ref 0.2–1)
BUN SERPL-MCNC: 26 MG/DL (ref 6–20)
BUN/CREAT SERPL: 21 (ref 12–20)
CALCIUM SERPL-MCNC: 9.5 MG/DL (ref 8.5–10.1)
CHLORIDE SERPL-SCNC: 114 MMOL/L (ref 97–108)
CHOLEST SERPL-MCNC: 171 MG/DL
CO2 SERPL-SCNC: 25 MMOL/L (ref 21–32)
CREAT SERPL-MCNC: 1.26 MG/DL (ref 0.55–1.02)
DIFFERENTIAL METHOD BLD: ABNORMAL
EOSINOPHIL # BLD: 0.1 K/UL (ref 0–0.4)
EOSINOPHIL NFR BLD: 1 % (ref 0–7)
ERYTHROCYTE [DISTWIDTH] IN BLOOD BY AUTOMATED COUNT: 16.7 % (ref 11.5–14.5)
GLOBULIN SER CALC-MCNC: 3.7 G/DL (ref 2–4)
GLUCOSE SERPL-MCNC: 62 MG/DL (ref 65–100)
HCT VFR BLD AUTO: 30.3 % (ref 35–47)
HDLC SERPL-MCNC: 69 MG/DL
HDLC SERPL: 2.5 (ref 0–5)
HGB BLD-MCNC: 9.5 G/DL (ref 11.5–16)
IMM GRANULOCYTES # BLD AUTO: 0 K/UL (ref 0–0.04)
IMM GRANULOCYTES NFR BLD AUTO: 0 % (ref 0–0.5)
LDLC SERPL CALC-MCNC: 87.2 MG/DL (ref 0–100)
LYMPHOCYTES # BLD: 2.1 K/UL (ref 0.8–3.5)
LYMPHOCYTES NFR BLD: 23 % (ref 12–49)
MCH RBC QN AUTO: 29.5 PG (ref 26–34)
MCHC RBC AUTO-ENTMCNC: 31.4 G/DL (ref 30–36.5)
MCV RBC AUTO: 94.1 FL (ref 80–99)
MONOCYTES # BLD: 0.6 K/UL (ref 0–1)
MONOCYTES NFR BLD: 6 % (ref 5–13)
NEUTS SEG # BLD: 6.4 K/UL (ref 1.8–8)
NEUTS SEG NFR BLD: 70 % (ref 32–75)
NRBC # BLD: 0 K/UL (ref 0–0.01)
NRBC BLD-RTO: 0 PER 100 WBC
PLATELET # BLD AUTO: 231 K/UL (ref 150–400)
PMV BLD AUTO: 12.1 FL (ref 8.9–12.9)
POTASSIUM SERPL-SCNC: 5.1 MMOL/L (ref 3.5–5.1)
PROT SERPL-MCNC: 7.2 G/DL (ref 6.4–8.2)
RBC # BLD AUTO: 3.22 M/UL (ref 3.8–5.2)
SODIUM SERPL-SCNC: 142 MMOL/L (ref 136–145)
TRIGL SERPL-MCNC: 74 MG/DL
VLDLC SERPL CALC-MCNC: 14.8 MG/DL
WBC # BLD AUTO: 9.2 K/UL (ref 3.6–11)

## 2024-11-12 PROCEDURE — 99213 OFFICE O/P EST LOW 20 MIN: CPT | Performed by: FAMILY MEDICINE

## 2024-11-12 NOTE — PROGRESS NOTES
Zeenat Rolle (:  1945) is a 79 y.o. female,Established patient, here for evaluation of the following chief complaint(s):  Follow-up         Assessment & Plan  Pure hypercholesterolemia, unspecified       Orders:    Lipid Panel; Future    Anemia, unspecified type       Orders:    CBC with Auto Differential; Future    Hyperkalemia       Orders:    Comprehensive Metabolic Panel; Future    Claudication (HCC)    Pt not symptomatic enough to require referral at this point.            Return in about 6 months (around 2025).       Subjective   HPI in for followup of potassium and kidneys. No complaints of chest pain, shortness of breath, TIAs, claudication or edema.  Gets some pain in R posterior calf when is out walking. No real back pain. Also gets some tingling in R 3,4th fingers. No nighttime tingling. Symptoms relieved by putting on a glove.     Review of Systems       Objective   Physical Exam  Cardiovascular:      Rate and Rhythm: Normal rate and regular rhythm.      Heart sounds: Normal heart sounds. No murmur heard.     Comments: Femoral bruits bilaterally. Absent pulses bilaterally  Pulmonary:      Effort: Pulmonary effort is normal.      Breath sounds: Normal breath sounds.   Abdominal:      General: Abdomen is flat. Bowel sounds are normal.      Palpations: Abdomen is soft.   Musculoskeletal:      Right lower leg: No edema.      Left lower leg: No edema.      Comments: Tinels, phalens negative. R hand- = sensation in all fingers.                   An electronic signature was used to authenticate this note.    --Lefty Segura MD

## 2024-11-12 NOTE — PROGRESS NOTES
Chief Complaint   Patient presents with    Follow-up       \"Have you been to the ER, urgent care clinic since your last visit?  Hospitalized since your last visit?\"    NO    “Have you seen or consulted any other health care providers outside of [facility] since your last visit?”      DR. CAMILLA BOWER            Click Here for Release of Records Request       Vitals:    24 1110   BP: (!) 119/50   Pulse: 51   Resp: 16   Temp: 97.8 °F (36.6 °C)   SpO2: 98%      Health Maintenance Due   Topic Date Due    Shingles vaccine (1 of 2) Never done    Respiratory Syncytial Virus (RSV) Pregnant or age 60 yrs+ (1 - 1-dose 60+ series) Never done    DTaP/Tdap/Td vaccine (2 - Td or Tdap) 2024    Flu vaccine (1) 2024    COVID-19 Vaccine ( season) 2024        The patient, Zeenat Rolle, identity was verified by name and .

## 2024-11-19 ENCOUNTER — TELEPHONE (OUTPATIENT)
Age: 79
End: 2024-11-19

## 2025-02-05 ENCOUNTER — TELEPHONE (OUTPATIENT)
Age: 80
End: 2025-02-05

## 2025-02-06 RX ORDER — AZITHROMYCIN 250 MG/1
TABLET, FILM COATED ORAL
Qty: 6 TABLET | Refills: 0 | Status: SHIPPED | OUTPATIENT
Start: 2025-02-06 | End: 2025-02-16

## 2025-02-06 NOTE — TELEPHONE ENCOUNTER
Dr. Segura gave verbal order for Z-pack. Medication peneded and route to provider. Patient made aware of new order and advised patient once she completes ABT if not better to call office and schedule an appointment per Dr. Segura.

## 2025-02-06 NOTE — TELEPHONE ENCOUNTER
Patient contacted and stated she has had a productive cough for 1 month. Patient states she has been having some SOB as well. Patient denies fever or headache. Patient is requesting a prescription.

## 2025-02-18 ENCOUNTER — TELEPHONE (OUTPATIENT)
Age: 80
End: 2025-02-18

## 2025-02-18 NOTE — TELEPHONE ENCOUNTER
Patient states that nurse told her to let her know when patient is done with antibiotic she is done and do not know the name of it she can be reached @ [phone]

## 2025-02-19 NOTE — TELEPHONE ENCOUNTER
Message left on patient voice mail reviewing last message that patient was to call back if not feeling any better.  If doing okay then no further action needed, but if patient is not doing better then please give office call.

## 2025-03-10 PROBLEM — R57.9 SHOCK (HCC): Status: RESOLVED | Noted: 2024-08-02 | Resolved: 2025-03-10

## 2025-03-10 PROBLEM — R55 SYNCOPE AND COLLAPSE: Status: RESOLVED | Noted: 2024-07-07 | Resolved: 2025-03-10

## 2025-03-10 PROBLEM — R51.9 SUDDEN ONSET OF SEVERE HEADACHE: Status: RESOLVED | Noted: 2021-12-29 | Resolved: 2025-03-10

## 2025-03-11 ENCOUNTER — APPOINTMENT (OUTPATIENT)
Facility: HOSPITAL | Age: 80
DRG: 309 | End: 2025-03-11
Payer: MEDICARE

## 2025-03-11 ENCOUNTER — HOSPITAL ENCOUNTER (INPATIENT)
Facility: HOSPITAL | Age: 80
LOS: 8 days | Discharge: NURSING, REHAB OR LONG-TERM CARE | DRG: 309 | End: 2025-03-22
Attending: STUDENT IN AN ORGANIZED HEALTH CARE EDUCATION/TRAINING PROGRAM | Admitting: STUDENT IN AN ORGANIZED HEALTH CARE EDUCATION/TRAINING PROGRAM
Payer: MEDICARE

## 2025-03-11 DIAGNOSIS — I49.9 ABNORMAL HEART RHYTHM: ICD-10-CM

## 2025-03-11 DIAGNOSIS — R53.83 OTHER FATIGUE: Primary | ICD-10-CM

## 2025-03-11 PROBLEM — R00.1 BRADYCARDIA: Status: ACTIVE | Noted: 2025-03-11

## 2025-03-11 LAB
ALBUMIN SERPL-MCNC: 2.9 G/DL (ref 3.5–5)
ALBUMIN/GLOB SERPL: 0.7 (ref 1.1–2.2)
ALP SERPL-CCNC: 176 U/L (ref 45–117)
ALT SERPL-CCNC: 48 U/L (ref 12–78)
ANION GAP SERPL CALC-SCNC: 4 MMOL/L (ref 2–12)
AST SERPL-CCNC: 41 U/L (ref 15–37)
BASE DEFICIT BLDV-SCNC: 1.6 MMOL/L
BASOPHILS # BLD: 0.02 K/UL (ref 0–0.1)
BASOPHILS NFR BLD: 0.3 % (ref 0–1)
BILIRUB SERPL-MCNC: 0.2 MG/DL (ref 0.2–1)
BUN SERPL-MCNC: 28 MG/DL (ref 6–20)
BUN/CREAT SERPL: 18 (ref 12–20)
CALCIUM SERPL-MCNC: 9.4 MG/DL (ref 8.5–10.1)
CHLORIDE SERPL-SCNC: 112 MMOL/L (ref 97–108)
CO2 SERPL-SCNC: 23 MMOL/L (ref 21–32)
CREAT SERPL-MCNC: 1.57 MG/DL (ref 0.55–1.02)
DIFFERENTIAL METHOD BLD: ABNORMAL
EOSINOPHIL # BLD: 0.03 K/UL (ref 0–0.4)
EOSINOPHIL NFR BLD: 0.5 % (ref 0–7)
ERYTHROCYTE [DISTWIDTH] IN BLOOD BY AUTOMATED COUNT: 18.6 % (ref 11.5–14.5)
FLUAV RNA SPEC QL NAA+PROBE: NOT DETECTED
FLUBV RNA SPEC QL NAA+PROBE: NOT DETECTED
GLOBULIN SER CALC-MCNC: 4.3 G/DL (ref 2–4)
GLUCOSE SERPL-MCNC: 113 MG/DL (ref 65–100)
HCO3 BLDV-SCNC: 24.9 MMOL/L (ref 23–28)
HCT VFR BLD AUTO: 24.7 % (ref 35–47)
HGB BLD-MCNC: 7.7 G/DL (ref 11.5–16)
IMM GRANULOCYTES # BLD AUTO: 0.02 K/UL (ref 0–0.04)
IMM GRANULOCYTES NFR BLD AUTO: 0.3 % (ref 0–0.5)
LACTATE BLD-SCNC: 0.91 MMOL/L (ref 0.4–2)
LYMPHOCYTES # BLD: 1.29 K/UL (ref 0.8–3.5)
LYMPHOCYTES NFR BLD: 20.7 % (ref 12–49)
MCH RBC QN AUTO: 29.7 PG (ref 26–34)
MCHC RBC AUTO-ENTMCNC: 31.2 G/DL (ref 30–36.5)
MCV RBC AUTO: 95.4 FL (ref 80–99)
MONOCYTES # BLD: 0.23 K/UL (ref 0–1)
MONOCYTES NFR BLD: 3.7 % (ref 5–13)
NEUTS SEG # BLD: 4.65 K/UL (ref 1.8–8)
NEUTS SEG NFR BLD: 74.5 % (ref 32–75)
NRBC # BLD: 0.09 K/UL (ref 0–0.01)
NRBC BLD-RTO: 1.4 PER 100 WBC
NT PRO BNP: 199 PG/ML
PCO2 BLDV: 50.6 MMHG (ref 41–51)
PH BLDV: 7.3 (ref 7.32–7.42)
PLATELET # BLD AUTO: 198 K/UL (ref 150–400)
PMV BLD AUTO: 11.9 FL (ref 8.9–12.9)
PO2 BLDV: <27 MMHG (ref 25–40)
POTASSIUM SERPL-SCNC: 5.4 MMOL/L (ref 3.5–5.1)
PROT SERPL-MCNC: 7.2 G/DL (ref 6.4–8.2)
RBC # BLD AUTO: 2.59 M/UL (ref 3.8–5.2)
RETICS # AUTO: 0.04 M/UL (ref 0.02–0.08)
RETICS/RBC NFR AUTO: 1.6 % (ref 0.7–2.1)
SARS-COV-2 RNA RESP QL NAA+PROBE: NOT DETECTED
SERVICE CMNT-IMP: ABNORMAL
SODIUM SERPL-SCNC: 139 MMOL/L (ref 136–145)
SOURCE: NORMAL
SPECIMEN TYPE: ABNORMAL
TROPONIN I SERPL HS-MCNC: 17 NG/L (ref 0–51)
WBC # BLD AUTO: 6.2 K/UL (ref 3.6–11)

## 2025-03-11 PROCEDURE — 83605 ASSAY OF LACTIC ACID: CPT

## 2025-03-11 PROCEDURE — 85045 AUTOMATED RETICULOCYTE COUNT: CPT

## 2025-03-11 PROCEDURE — 70450 CT HEAD/BRAIN W/O DYE: CPT

## 2025-03-11 PROCEDURE — 2580000003 HC RX 258: Performed by: STUDENT IN AN ORGANIZED HEALTH CARE EDUCATION/TRAINING PROGRAM

## 2025-03-11 PROCEDURE — 84484 ASSAY OF TROPONIN QUANT: CPT

## 2025-03-11 PROCEDURE — 87636 SARSCOV2 & INF A&B AMP PRB: CPT

## 2025-03-11 PROCEDURE — 36415 COLL VENOUS BLD VENIPUNCTURE: CPT

## 2025-03-11 PROCEDURE — 83540 ASSAY OF IRON: CPT

## 2025-03-11 PROCEDURE — 2500000003 HC RX 250 WO HCPCS: Performed by: STUDENT IN AN ORGANIZED HEALTH CARE EDUCATION/TRAINING PROGRAM

## 2025-03-11 PROCEDURE — 80053 COMPREHEN METABOLIC PANEL: CPT

## 2025-03-11 PROCEDURE — 83550 IRON BINDING TEST: CPT

## 2025-03-11 PROCEDURE — 82607 VITAMIN B-12: CPT

## 2025-03-11 PROCEDURE — 82803 BLOOD GASES ANY COMBINATION: CPT

## 2025-03-11 PROCEDURE — 83880 ASSAY OF NATRIURETIC PEPTIDE: CPT

## 2025-03-11 PROCEDURE — 99285 EMERGENCY DEPT VISIT HI MDM: CPT

## 2025-03-11 PROCEDURE — 85025 COMPLETE CBC W/AUTO DIFF WBC: CPT

## 2025-03-11 PROCEDURE — G0378 HOSPITAL OBSERVATION PER HR: HCPCS

## 2025-03-11 PROCEDURE — 93005 ELECTROCARDIOGRAM TRACING: CPT | Performed by: STUDENT IN AN ORGANIZED HEALTH CARE EDUCATION/TRAINING PROGRAM

## 2025-03-11 PROCEDURE — 71045 X-RAY EXAM CHEST 1 VIEW: CPT

## 2025-03-11 RX ORDER — POLYETHYLENE GLYCOL 3350 17 G/17G
17 POWDER, FOR SOLUTION ORAL DAILY PRN
Status: DISCONTINUED | OUTPATIENT
Start: 2025-03-11 | End: 2025-03-22 | Stop reason: HOSPADM

## 2025-03-11 RX ORDER — 0.9 % SODIUM CHLORIDE 0.9 %
500 INTRAVENOUS SOLUTION INTRAVENOUS ONCE
Status: COMPLETED | OUTPATIENT
Start: 2025-03-11 | End: 2025-03-11

## 2025-03-11 RX ORDER — ASPIRIN 81 MG/1
81 TABLET ORAL DAILY
Status: DISCONTINUED | OUTPATIENT
Start: 2025-03-12 | End: 2025-03-22 | Stop reason: HOSPADM

## 2025-03-11 RX ORDER — ONDANSETRON 2 MG/ML
4 INJECTION INTRAMUSCULAR; INTRAVENOUS EVERY 6 HOURS PRN
Status: DISCONTINUED | OUTPATIENT
Start: 2025-03-11 | End: 2025-03-11

## 2025-03-11 RX ORDER — ONDANSETRON 2 MG/ML
4 INJECTION INTRAMUSCULAR; INTRAVENOUS EVERY 6 HOURS PRN
Status: DISCONTINUED | OUTPATIENT
Start: 2025-03-11 | End: 2025-03-22 | Stop reason: HOSPADM

## 2025-03-11 RX ORDER — ENOXAPARIN SODIUM 100 MG/ML
30 INJECTION SUBCUTANEOUS DAILY
Status: DISCONTINUED | OUTPATIENT
Start: 2025-03-12 | End: 2025-03-22 | Stop reason: HOSPADM

## 2025-03-11 RX ORDER — ACETAMINOPHEN 650 MG/1
650 SUPPOSITORY RECTAL EVERY 6 HOURS PRN
Status: DISCONTINUED | OUTPATIENT
Start: 2025-03-11 | End: 2025-03-22 | Stop reason: HOSPADM

## 2025-03-11 RX ORDER — SODIUM CHLORIDE 0.9 % (FLUSH) 0.9 %
5-40 SYRINGE (ML) INJECTION EVERY 12 HOURS SCHEDULED
Status: DISCONTINUED | OUTPATIENT
Start: 2025-03-11 | End: 2025-03-22 | Stop reason: HOSPADM

## 2025-03-11 RX ORDER — ACETAMINOPHEN 325 MG/1
650 TABLET ORAL EVERY 6 HOURS PRN
Status: DISCONTINUED | OUTPATIENT
Start: 2025-03-11 | End: 2025-03-22 | Stop reason: HOSPADM

## 2025-03-11 RX ORDER — ACETAMINOPHEN 325 MG/1
650 TABLET ORAL EVERY 6 HOURS PRN
Status: DISCONTINUED | OUTPATIENT
Start: 2025-03-11 | End: 2025-03-11

## 2025-03-11 RX ORDER — ONDANSETRON 4 MG/1
4 TABLET, ORALLY DISINTEGRATING ORAL EVERY 8 HOURS PRN
Status: DISCONTINUED | OUTPATIENT
Start: 2025-03-11 | End: 2025-03-22 | Stop reason: HOSPADM

## 2025-03-11 RX ORDER — SODIUM CHLORIDE 9 MG/ML
INJECTION, SOLUTION INTRAVENOUS PRN
Status: DISCONTINUED | OUTPATIENT
Start: 2025-03-11 | End: 2025-03-22 | Stop reason: HOSPADM

## 2025-03-11 RX ORDER — ATORVASTATIN CALCIUM 40 MG/1
40 TABLET, FILM COATED ORAL DAILY
Status: DISCONTINUED | OUTPATIENT
Start: 2025-03-12 | End: 2025-03-22 | Stop reason: HOSPADM

## 2025-03-11 RX ORDER — SODIUM CHLORIDE 0.9 % (FLUSH) 0.9 %
5-40 SYRINGE (ML) INJECTION PRN
Status: DISCONTINUED | OUTPATIENT
Start: 2025-03-11 | End: 2025-03-22 | Stop reason: HOSPADM

## 2025-03-11 RX ADMIN — SODIUM CHLORIDE, PRESERVATIVE FREE 10 ML: 5 INJECTION INTRAVENOUS at 23:05

## 2025-03-11 RX ADMIN — SODIUM CHLORIDE 500 ML: 0.9 INJECTION, SOLUTION INTRAVENOUS at 20:11

## 2025-03-11 ASSESSMENT — PAIN - FUNCTIONAL ASSESSMENT
PAIN_FUNCTIONAL_ASSESSMENT: NONE - DENIES PAIN

## 2025-03-11 NOTE — ED PROVIDER NOTES
[facility] EMERGENCY DEPARTMENT  EMERGENCY DEPARTMENT ENCOUNTER       Pt Name: Zeenat Rolle  MRN: [id number]  Birthdate 1945  Date of evaluation: 3/11/2025  Provider: Gato Denis MD   PCP: Lefty Segura MD  Note Started: 7:36 PM EDT 3/11/25     CHIEF COMPLAINT       Chief Complaint   Patient presents with    Fatigue     Patient wheeled to triage w c/o acute onset of fatigue and weakness. Daughter says she last saw her on Wed and she was normal.     HISTORY OF PRESENT ILLNESS: 1 or more elements      History From: patient and daughter (see ED course), History limited by: none     Zeenat Rolle is a 79 y.o. female with history of below presents emergency department with nonspecific weakness that has been present over the past 2 days.  She denies any other symptoms.  Her daughter found her today to be weaker and had her brought to the ED.  She seems slightly confused.    Please See MDM for Additional Details of the HPI/PMH  Nursing Notes were all reviewed and agreed with or any disagreements were addressed in the HPI.     REVIEW OF SYSTEMS      Positives and Pertinent negatives as per HPI.    PAST HISTORY     Past Medical History:  Past Medical History:   Diagnosis Date    Achalasia     Femoral bruit     bilateral    GERD (gastroesophageal reflux disease)     Hypercholesterolemia     Hyperparathyroidism     Right leg claudication     Shock (HCC) 08/02/2024    Stenosis of left subclavian artery     Sudden onset of severe headache 12/29/2021    Syncope and collapse 07/07/2024    Vertebral artery stenosis        Past Surgical History:  Past Surgical History:   Procedure Laterality Date    COLONOSCOPY  6/2006    [place]- nl- but had hx polyp in 2002    COLONOSCOPY N/A 8/25/2016    COLONOSCOPY performed by Drake Powell MD at [facility] ENDOSCOPY    COLONOSCOPY FLX DX W/COLLJ SPEC WHEN PFRMD  6/6/2011         COLONOSCOPY,REMV JOAQUIN,FORCEP/CAUTERY  8/25/2016         COLONOSCOPY,REMV JOAQUIN,SNARE  8/25/2016

## 2025-03-12 ENCOUNTER — PATIENT MESSAGE (OUTPATIENT)
Age: 80
End: 2025-03-12

## 2025-03-12 ENCOUNTER — ANESTHESIA (OUTPATIENT)
Facility: HOSPITAL | Age: 80
End: 2025-03-12
Payer: MEDICARE

## 2025-03-12 ENCOUNTER — ANESTHESIA EVENT (OUTPATIENT)
Facility: HOSPITAL | Age: 80
End: 2025-03-12
Payer: MEDICARE

## 2025-03-12 ENCOUNTER — TELEPHONE (OUTPATIENT)
Age: 80
End: 2025-03-12

## 2025-03-12 LAB
ANION GAP SERPL CALC-SCNC: 3 MMOL/L (ref 2–12)
ANION GAP SERPL CALC-SCNC: 3 MMOL/L (ref 2–12)
APPEARANCE UR: CLEAR
BACTERIA URNS QL MICRO: NEGATIVE /HPF
BASOPHILS # BLD: 0.03 K/UL (ref 0–0.1)
BASOPHILS NFR BLD: 0.3 % (ref 0–1)
BILIRUB UR QL: NEGATIVE
BUN SERPL-MCNC: 21 MG/DL (ref 6–20)
BUN SERPL-MCNC: 24 MG/DL (ref 6–20)
BUN/CREAT SERPL: 14 (ref 12–20)
BUN/CREAT SERPL: 18 (ref 12–20)
CALCIUM SERPL-MCNC: 9 MG/DL (ref 8.5–10.1)
CALCIUM SERPL-MCNC: 9.3 MG/DL (ref 8.5–10.1)
CHLORIDE SERPL-SCNC: 114 MMOL/L (ref 97–108)
CHLORIDE SERPL-SCNC: 114 MMOL/L (ref 97–108)
CO2 SERPL-SCNC: 22 MMOL/L (ref 21–32)
CO2 SERPL-SCNC: 22 MMOL/L (ref 21–32)
COLOR UR: NORMAL
CREAT SERPL-MCNC: 1.36 MG/DL (ref 0.55–1.02)
CREAT SERPL-MCNC: 1.45 MG/DL (ref 0.55–1.02)
DIFFERENTIAL METHOD BLD: ABNORMAL
EKG ATRIAL RATE: 58 BPM
EKG DIAGNOSIS: NORMAL
EKG P AXIS: 13 DEGREES
EKG P-R INTERVAL: 180 MS
EKG Q-T INTERVAL: 420 MS
EKG QRS DURATION: 82 MS
EKG QTC CALCULATION (BAZETT): 412 MS
EKG R AXIS: 65 DEGREES
EKG T AXIS: 82 DEGREES
EKG VENTRICULAR RATE: 58 BPM
EOSINOPHIL # BLD: 0.02 K/UL (ref 0–0.4)
EOSINOPHIL NFR BLD: 0.2 % (ref 0–7)
EPITH CASTS URNS QL MICRO: NORMAL /LPF
ERYTHROCYTE [DISTWIDTH] IN BLOOD BY AUTOMATED COUNT: 18.6 % (ref 11.5–14.5)
GLUCOSE BLD STRIP.AUTO-MCNC: 104 MG/DL (ref 65–117)
GLUCOSE BLD STRIP.AUTO-MCNC: 67 MG/DL (ref 65–117)
GLUCOSE SERPL-MCNC: 56 MG/DL (ref 65–100)
GLUCOSE SERPL-MCNC: 67 MG/DL (ref 65–100)
GLUCOSE UR STRIP.AUTO-MCNC: NEGATIVE MG/DL
HCT VFR BLD AUTO: 23.4 % (ref 35–47)
HGB BLD-MCNC: 7.5 G/DL (ref 11.5–16)
HGB UR QL STRIP: NEGATIVE
HYALINE CASTS URNS QL MICRO: NORMAL /LPF (ref 0–2)
IMM GRANULOCYTES # BLD AUTO: 0.04 K/UL (ref 0–0.04)
IMM GRANULOCYTES NFR BLD AUTO: 0.4 % (ref 0–0.5)
IRON SATN MFR SERPL: 21 % (ref 20–50)
IRON SERPL-MCNC: 63 UG/DL (ref 35–150)
KETONES UR QL STRIP.AUTO: NEGATIVE MG/DL
LEUKOCYTE ESTERASE UR QL STRIP.AUTO: NEGATIVE
LYMPHOCYTES # BLD: 1.11 K/UL (ref 0.8–3.5)
LYMPHOCYTES NFR BLD: 11.7 % (ref 12–49)
MCH RBC QN AUTO: 29.9 PG (ref 26–34)
MCHC RBC AUTO-ENTMCNC: 32.1 G/DL (ref 30–36.5)
MCV RBC AUTO: 93.2 FL (ref 80–99)
MONOCYTES # BLD: 0.34 K/UL (ref 0–1)
MONOCYTES NFR BLD: 3.6 % (ref 5–13)
NEUTS SEG # BLD: 7.95 K/UL (ref 1.8–8)
NEUTS SEG NFR BLD: 83.8 % (ref 32–75)
NITRITE UR QL STRIP.AUTO: NEGATIVE
NRBC # BLD: 0.07 K/UL (ref 0–0.01)
NRBC BLD-RTO: 0.7 PER 100 WBC
PH UR STRIP: 5 (ref 5–8)
PLATELET # BLD AUTO: 191 K/UL (ref 150–400)
PMV BLD AUTO: 11.8 FL (ref 8.9–12.9)
POTASSIUM SERPL-SCNC: 5.6 MMOL/L (ref 3.5–5.1)
POTASSIUM SERPL-SCNC: 6 MMOL/L (ref 3.5–5.1)
PROT UR STRIP-MCNC: NEGATIVE MG/DL
RBC # BLD AUTO: 2.51 M/UL (ref 3.8–5.2)
RBC #/AREA URNS HPF: NORMAL /HPF (ref 0–5)
SERVICE CMNT-IMP: NORMAL
SERVICE CMNT-IMP: NORMAL
SODIUM SERPL-SCNC: 139 MMOL/L (ref 136–145)
SODIUM SERPL-SCNC: 139 MMOL/L (ref 136–145)
SP GR UR REFRACTOMETRY: 1.01
TIBC SERPL-MCNC: 295 UG/DL (ref 250–450)
URINE CULTURE IF INDICATED: NORMAL
UROBILINOGEN UR QL STRIP.AUTO: 0.2 EU/DL (ref 0.2–1)
WBC # BLD AUTO: 9.5 K/UL (ref 3.6–11)
WBC URNS QL MICRO: NORMAL /HPF (ref 0–4)

## 2025-03-12 PROCEDURE — 85025 COMPLETE CBC W/AUTO DIFF WBC: CPT

## 2025-03-12 PROCEDURE — 6370000000 HC RX 637 (ALT 250 FOR IP): Performed by: STUDENT IN AN ORGANIZED HEALTH CARE EDUCATION/TRAINING PROGRAM

## 2025-03-12 PROCEDURE — G0378 HOSPITAL OBSERVATION PER HR: HCPCS

## 2025-03-12 PROCEDURE — 97161 PT EVAL LOW COMPLEX 20 MIN: CPT

## 2025-03-12 PROCEDURE — 2500000003 HC RX 250 WO HCPCS: Performed by: STUDENT IN AN ORGANIZED HEALTH CARE EDUCATION/TRAINING PROGRAM

## 2025-03-12 PROCEDURE — 96372 THER/PROPH/DIAG INJ SC/IM: CPT

## 2025-03-12 PROCEDURE — 6360000002 HC RX W HCPCS: Performed by: STUDENT IN AN ORGANIZED HEALTH CARE EDUCATION/TRAINING PROGRAM

## 2025-03-12 PROCEDURE — 97535 SELF CARE MNGMENT TRAINING: CPT

## 2025-03-12 PROCEDURE — 80048 BASIC METABOLIC PNL TOTAL CA: CPT

## 2025-03-12 PROCEDURE — 51798 US URINE CAPACITY MEASURE: CPT

## 2025-03-12 PROCEDURE — 36415 COLL VENOUS BLD VENIPUNCTURE: CPT

## 2025-03-12 PROCEDURE — 97165 OT EVAL LOW COMPLEX 30 MIN: CPT

## 2025-03-12 PROCEDURE — 82962 GLUCOSE BLOOD TEST: CPT

## 2025-03-12 PROCEDURE — 81001 URINALYSIS AUTO W/SCOPE: CPT

## 2025-03-12 PROCEDURE — 51701 INSERT BLADDER CATHETER: CPT

## 2025-03-12 PROCEDURE — 97116 GAIT TRAINING THERAPY: CPT

## 2025-03-12 RX ADMIN — SODIUM ZIRCONIUM CYCLOSILICATE 10 G: 5 POWDER, FOR SUSPENSION ORAL at 09:26

## 2025-03-12 RX ADMIN — SODIUM CHLORIDE, PRESERVATIVE FREE 10 ML: 5 INJECTION INTRAVENOUS at 09:27

## 2025-03-12 RX ADMIN — ATORVASTATIN CALCIUM 40 MG: 40 TABLET, FILM COATED ORAL at 09:26

## 2025-03-12 RX ADMIN — ASPIRIN 81 MG: 81 TABLET, COATED ORAL at 09:26

## 2025-03-12 RX ADMIN — ENOXAPARIN SODIUM 30 MG: 100 INJECTION SUBCUTANEOUS at 09:26

## 2025-03-12 NOTE — PLAN OF CARE
Problem: Discharge Planning  Goal: Discharge to home or other facility with appropriate resources  Outcome: Progressing     Problem: Safety - Adult  Goal: Free from fall injury  Outcome: Progressing     Problem: Respiratory - Adult  Goal: Achieves optimal ventilation and oxygenation  Outcome: Progressing     Problem: Cardiovascular - Adult  Goal: Maintains optimal cardiac output and hemodynamic stability  Outcome: Progressing  Goal: Absence of cardiac dysrhythmias or at baseline  Outcome: Progressing     Problem: Musculoskeletal - Adult  Goal: Return mobility to safest level of function  Outcome: Progressing  Goal: Maintain proper alignment of affected body part  Outcome: Progressing  Goal: Return ADL status to a safe level of function  Outcome: Progressing     Problem: Hematologic - Adult  Goal: Maintains hematologic stability  Outcome: Progressing

## 2025-03-12 NOTE — PLAN OF CARE
Problem: Discharge Planning  Goal: Discharge to home or other facility with appropriate resources  3/12/2025 1347 by Mag Palacio RN  Outcome: Progressing  3/12/2025 0200 by Carlyle Sheffield RN  Outcome: Progressing     Problem: Safety - Adult  Goal: Free from fall injury  3/12/2025 1347 by Mag Palacio RN  Outcome: Progressing  3/12/2025 0200 by Carlyle Sheffield RN  Outcome: Progressing     Problem: Respiratory - Adult  Goal: Achieves optimal ventilation and oxygenation  3/12/2025 1347 by Mag Palacio RN  Outcome: Progressing  3/12/2025 0200 by Carlyle Sheffield RN  Outcome: Progressing     Problem: Cardiovascular - Adult  Goal: Maintains optimal cardiac output and hemodynamic stability  3/12/2025 1347 by Mag Palacio RN  Outcome: Progressing  3/12/2025 0200 by Carlyle Sheffield RN  Outcome: Progressing  Goal: Absence of cardiac dysrhythmias or at baseline  3/12/2025 1347 by Mag Palacio RN  Outcome: Progressing  3/12/2025 0200 by Carlyle Sheffield RN  Outcome: Progressing     Problem: Musculoskeletal - Adult  Goal: Return mobility to safest level of function  3/12/2025 1347 by Mag Palacio RN  Outcome: Progressing  3/12/2025 0200 by Carlyle Sheffield RN  Outcome: Progressing  Goal: Maintain proper alignment of affected body part  3/12/2025 1347 by Mag Palacio RN  Outcome: Progressing  3/12/2025 0200 by Carlyle Sheffield RN  Outcome: Progressing  Goal: Return ADL status to a safe level of function  3/12/2025 1347 by Mag Palacio RN  Outcome: Progressing  3/12/2025 0200 by Carlyle Sheffield RN  Outcome: Progressing     Problem: Hematologic - Adult  Goal: Maintains hematologic stability  3/12/2025 1347 by Mag Palacio RN  Outcome: Progressing  3/12/2025 0200 by Carlyle Sheffield RN  Outcome: Progressing     Problem: Physical Therapy - Adult  Goal: By Discharge: Performs mobility at highest level of function for planned discharge setting.  See evaluation for individualized goals.  Description: FUNCTIONAL

## 2025-03-12 NOTE — CONSULTS
[facility]        Consult    NAME: Zeenat Rolle   :  1945   MRN:  [id number]     Date/Time:  3/12/2025 10:56 AM    Patient PCP: Lefty Segura MD  ________________________________________________________________________     Assessment:     Sinus Bradycardia  History of Syncope  Anemia (7.5)  Hyperkalemia (has had this)  Dyslipidemia  CKD 3 ([place])  Hypoalbuminemia  Achalasia  GERD  PAD, Subclavian stenoisis  Former tobacco  Full Code  ,Lives alone, daughter visits daily  -t/-e/-s      Plan:   EKG with SB rate 58 bpm,  ms, QRS 82 ms, Qtc 412 ms. Tele with SSS and pauses noted overnight to around 32. Avoid AV Jack blockers. She was offered PPM last fall and turned down. TTE with preserved LVEF and no sig valvular disease.   Her hemoglobin of 7.7 is contributing to her extreme fatigue. This will need to be worked up. She denies bleeding. She required unit of PRBCs in  for same.  Had a long conversation with daughter and son. I think permanent pacemaker is reasonable as she is bradycardic and it is very likely that her fatigue will improve follwing a pacemaker. I will plan to tentatively do this on Friday. She will likely needs a single chamber pacemaker only. Risks benefits and alternatives discussed at length with daughter and son. Daughter is her power of .      [x]       High complexity decision making was performed in this patient at high risk for decompensation with multiple organ involvement.      Subjective:   CHIEF COMPLAINT: Sinus Diogenes/ Fatigue    HISTORY OF PRESENT ILLNESS:     Zeenat is a 79 y.o.   female who presented to Ed on 3/11/2025 in the evening with complaints of fatigue, weakness and mild confusion. She had had this for a couple of days. She is known to our group, as she was seen in 2024 and underwent holter monitoring for sinus bradycardia. She did have heart rates in the 40s and at that time declined  Venous 24.9 23.0 - 28.0 MMOL/L    Base Deficit, Venous 1.6 mmol/L    Specimen type: VENOUS BLOOD      Performed by: Moraima Obando \"Rey Dixon\" RN    POC Lactic Acid    Collection Time: 03/11/25 11:17 PM   Result Value Ref Range    POC Lactic Acid 0.91 0.40 - 2.00 mmol/L   Urinalysis with Reflex to Culture    Collection Time: 03/12/25  4:49 AM    Specimen: Urine   Result Value Ref Range    Color, UA YELLOW/STRAW      Appearance CLEAR CLEAR      Specific Gravity, UA 1.010      pH, Urine 5.0 5.0 - 8.0      Protein, UA Negative NEG mg/dL    Glucose, Ur Negative NEG mg/dL    Ketones, Urine Negative NEG mg/dL    Bilirubin, Urine Negative NEG      Blood, Urine Negative NEG      Urobilinogen, Urine 0.2 0.2 - 1.0 EU/dL    Nitrite, Urine Negative NEG      Leukocyte Esterase, Urine Negative NEG      WBC, UA 0-4 0 - 4 /hpf    RBC, UA 0-5 0 - 5 /hpf    Epithelial Cells, UA FEW FEW /lpf    BACTERIA, URINE Negative NEG /hpf    Urine Culture if Indicated CULTURE NOT INDICATED BY UA RESULT CNI      Hyaline Casts, UA 2-5 0 - 2 /lpf   Basic Metabolic Panel    Collection Time: 03/12/25  5:28 AM   Result Value Ref Range    Sodium 139 136 - 145 mmol/L    Potassium 6.0 (H) 3.5 - 5.1 mmol/L    Chloride 114 (H) 97 - 108 mmol/L    CO2 22 21 - 32 mmol/L    Anion Gap 3 2 - 12 mmol/L    Glucose 56 (L) 65 - 100 mg/dL    BUN 24 (H) 6 - 20 MG/DL    Creatinine 1.36 (H) 0.55 - 1.02 MG/DL    BUN/Creatinine Ratio 18 12 - 20      Est, Glom Filt Rate 39 (L) >60 ml/min/1.73m2    Calcium 9.0 8.5 - 10.1 MG/DL   CBC with Auto Differential    Collection Time: 03/12/25  5:28 AM   Result Value Ref Range    WBC 9.5 3.6 - 11.0 K/uL    RBC 2.51 (L) 3.80 - 5.20 M/uL    Hemoglobin 7.5 (L) 11.5 - 16.0 g/dL    Hematocrit 23.4 (L) 35.0 - 47.0 %    MCV 93.2 80.0 - 99.0 FL    MCH 29.9 26.0 - 34.0 PG    MCHC 32.1 30.0 - 36.5 g/dL    RDW 18.6 (H) 11.5 - 14.5 %    Platelets 191 150 - 400 K/uL    MPV 11.8 8.9 - 12.9 FL    Nucleated RBCs 0.7 (H) 0  WBC    nRBC 0.07 (H)

## 2025-03-12 NOTE — TELEPHONE ENCOUNTER
Patient's daughter called to report that patient is the hospital  and to cancel today's appointment.

## 2025-03-12 NOTE — PROGRESS NOTES
Nephrology    Consult received - CA on CKD  -Chart reviewed, patient of [facility], seen by Dr Huitron in August of last year with planned outpatient follow up  -Requested RN recall consult to their group for continuity of care    --[facility]

## 2025-03-12 NOTE — PLAN OF CARE
Problem: Physical Therapy - Adult  Goal: By Discharge: Performs mobility at highest level of function for planned discharge setting.  See evaluation for individualized goals.  Description: FUNCTIONAL STATUS PRIOR TO ADMISSION: Pt poor hx.  Stated she uses device but unclear.  Was using SPC when admitted 8/2024.    HOME SUPPORT PRIOR TO ADMISSION: The patient lived alone with DTR to provide assistance.    Physical Therapy Goals  Initiated 3/12/2025  1.  Patient will move from supine to sit and sit to supine in bed with supervision/set-up within 7 day(s).    2.  Patient will perform sit to stand with supervision/set-up within 7 day(s).  3.  Patient will transfer from bed to chair and chair to bed with supervision/set-up using the least restrictive device within 7 day(s).  4.  Patient will ambulate with supervision/set-up for 150 feet with the least restrictive device within 7 day(s).   5.  Patient will ascend/descend 12 stairs with 1 handrail(s) with contact guard assist within 7 day(s).    Outcome: Progressing       PHYSICAL THERAPY EVALUATION    Patient: Zeenat Rolle (79 y.o. female)  Date: 3/12/2025  Primary Diagnosis: Bradycardia [R00.1]  Other fatigue [R53.83]  Procedure(s) (LRB):  Insert PPM single ventricular (N/A) * Surgery Date in Future *   Precautions: Restrictions/Precautions  Restrictions/Precautions: Fall Risk            ASSESSMENT :   DEFICITS/IMPAIRMENTS:   The patient is limited by decreased functional mobility, independence in ADLs, ROM, strength, body mechanics, activity tolerance, endurance, safety awareness, cognition, command following, coordination, balance. Pt generally mobilized at a Mod A level during today's session. Pt received Mod A, and agreeable to work with therapy. Pt performed bed mobility, transfers, gait training, balance assessment, this session.  Pt required close cueing for all mobility and displayed increased time for processing and command following.  Pt with fenestrating

## 2025-03-12 NOTE — PLAN OF CARE
Problem: Occupational Therapy - Adult  Goal: By Discharge: Performs self-care activities at highest level of function for planned discharge setting.  See evaluation for individualized goals.  Description: FUNCTIONAL STATUS PRIOR TO ADMISSION:  Patient was ambulatory using RW and states she was Mod Indep with ADLs, living alone, with daughter checking in with her daily.   ,  ,  ,  ,  ,  ,  ,  ,  ,  ,       HOME SUPPORT: Patient lived alone with local daughter checking in with patient on a daily basis.    Occupational Therapy Goals:  Initiated 3/12/2025  1.  Patient will perform seated self-feeding with Set-up within 7 day(s).  2.  Patient will perform seated grooming with Set-up within 7 day(s).  3.  Patient will perform seated bathing with Minimal Assist within 7 day(s).  4.  Patient will perform RW toilet transfers with Contact Guard Assist  within 7 day(s).  5.  Patient will perform all aspects of RW toileting with Contact Guard Assist within 7 day(s).      Outcome: Progressing    OCCUPATIONAL THERAPY EVALUATION    Patient: Zeenat Rolle (79 y.o. female)  Date: 3/12/2025  Primary Diagnosis: Bradycardia [R00.1]  Other fatigue [R53.83]  Procedure(s) (LRB):  Insert PPM single ventricular (N/A) * Surgery Date in Future *     Precautions: Fall Risk                  ASSESSMENT :  The patient is limited by decreased strength/endurance (noted with decreased BUE above head reaching), decreased functional grasp (noted difficulty maintaining grasp of toast and bringing to mouth), BUE tremors, decreased mobility/balance, decreased activity tolerance, decreased full body reaching and delayed response, all of which limit pt's ability to safely complete self-care routine.  Pt noted to benefit from extended time for all task completion, completing bed mobility and RW level transfers with Mod A, benefiting from Total A for LE dressing.  Pt reports living alone, with daughter providing daily checks.  Pt noted with decreased task  the EuroQoL-5D. Quality [employer] Research, 13, 468-48                                                                                                                                                                                                                                 Activity Tolerance:   Fair  and requires frequent rest breaks    After treatment:   Patient left in no apparent distress sitting up in chair, Call bell within reach, and Bed/ chair alarm activated    COMMUNICATION/EDUCATION:   The patient's plan of care was discussed with: physical therapist, registered nurse, , and NP    Patient Education  Education Given To: Patient  Education Provided: Role of Therapy;Plan of Care;ADL Adaptive Strategies;Transfer Training;Precautions;Energy Conservation  Education Method: Demonstration;Verbal  Education Outcome: Verbalized understanding;Demonstrated understanding;Continued education needed    Thank you for this referral.  Matthew Kerley, OT  Minutes: 34    Occupational Therapy Evaluation Charge Determination   History Examination Decision-Making   LOW Complexity : Brief history review  LOW Complexity: 1-3 Performance deficits relating to physical, cognitive, or psychosocial skills that result in activity limitations and/or participation restrictions LOW Complexity: No comorbidities that affect functional and  no verbal  or physical assist needed to complete eval tasks   Based on the above components, the patient evaluation is determined to be of the following complexity level: Low

## 2025-03-12 NOTE — PROGRESS NOTES
End of Shift Note    Bedside shift change report given to Mag AMBROCIO (oncoming nurse) by Carlyle Sheffield RN (offgoing nurse).  Report included the following information SBAR, Kardex, Intake/Output, MAR, Cardiac Rhythm sinus brent/sinus rhythm, and Quality Measures    Shift worked:  [id number]     Shift summary and any significant changes:     Admission from ER. Dual skin assessment done, Routine admission done, bath done. Hourly rounding done, Patient needs attended.      Concerns for physician to address:  none     Zone phone for oncoming shift:   [phone]       Activity:  Level of Assistance: Minimal assist, patient does 75% or more  Number times ambulated in hallways past shift: 0  Number of times OOB to chair past shift: 0    Cardiac:   Cardiac Monitoring: Yes      Cardiac Rhythm: Sinus brent    Access:  Current line(s): PIV     Genitourinary:        Respiratory:   O2 Device: None (Room air)  Chronic home O2 use?: NO  Incentive spirometer at bedside: NO    GI:  Last BM (including prior to admit): 03/10/25  Current diet:  ADULT DIET; Regular; 4 carb choices (60 gm/meal); Low Fat/Low Chol/High Fiber/LUZ ELENA  Passing flatus: YES    Pain Management:   Patient states pain is manageable on current regimen: YES    Skin:  Eron Scale Score: 19  Interventions: Wound Offloading (Prevention Methods): Pillows, Repositioning, Turning    Patient Safety:  Fall Risk: Nursing Judgement-Fall Risk High(Add Comments): Yes  Fall Risk Interventions  Nursing Judgement-Fall Risk High(Add Comments): Yes  Toilet Every 2 Hours-In Advance of Need: Yes  Hourly Visual Checks: Eyes closed, Quiet  Fall Visual Posted: Fall sign posted, Socks  Room Door Open: Deferred to promote rest  Alarm On: Bed  Patient Moved Closer to Nursing Station: No    Active Consults:   IP CONSULT TO CARDIOLOGY    Length of Stay:  Expected LOS: 2  Actual LOS: 0    Carlyle Sheffield RN

## 2025-03-12 NOTE — PROGRESS NOTES
It turns out that tomorrow afternoon works better in terms of ep lab staffing for ppm implantation. Please make patient npo am. I will talk to family tomorrow morning to see if they agree

## 2025-03-12 NOTE — ED NOTES
Patient is bradycardic at rest. When sleeping, she drops down into the mid 30's. Provider notified.

## 2025-03-12 NOTE — ED NOTES
Report given to CRISTÓBAL Tadeo. Nurse was informed of reason for arrival, vitals, labs, medications, orders, procedures, results, anything left pending and current plan of action. Questions were asked and received prior to departure from the patient.

## 2025-03-12 NOTE — ANESTHESIA PRE PROCEDURE
Department of Anesthesiology  Preprocedure Note       Name:  Zeenat Rolle   Age:  79 y.o.  :  1945                                          MRN:  [id number]         Date:  3/12/2025      Surgeon: Surgeon(s):  Daryl Mackenzie MD    Procedure: Procedure(s):  Insert PPM dual    Medications prior to admission:   Prior to Admission medications    Medication Sig Start Date End Date Taking? Authorizing Provider   atorvastatin (LIPITOR) 40 MG tablet TAKE 1 TABLET BY MOUTH DAILY FOR CHOLESTEROL 24  Yes Lefty Segura MD   triamcinolone (ARISTOCORT) 0.5 % cream Apply topically 2 times daily as needed   Yes Provider, MD Reji   aspirin 81 MG EC tablet Take 1 tablet by mouth daily 22  Yes Automatic Reconciliation, Ar       Current medications:    Current Facility-Administered Medications   Medication Dose Route Frequency Provider Last Rate Last Admin    aspirin EC tablet 81 mg  81 mg Oral Daily Price Miranda, DO   81 mg at 25 0926    atorvastatin (LIPITOR) tablet 40 mg  40 mg Oral Daily Price Miranda, DO   40 mg at 25 0926    sodium chloride flush 0.9 % injection 5-40 mL  5-40 mL IntraVENous 2 times per day Price Miranda, DO   10 mL at 25 0927    sodium chloride flush 0.9 % injection 5-40 mL  5-40 mL IntraVENous PRN Price Miranda, DO        0.9 % sodium chloride infusion   IntraVENous PRN Price Miranda, DO        enoxaparin Sodium (LOVENOX) injection 30 mg  30 mg SubCUTAneous Daily Price Miranda, DO   30 mg at 25 0926    ondansetron (ZOFRAN-ODT) disintegrating tablet 4 mg  4 mg Oral Q8H PRN Price Miranda, DO        Or    ondansetron (ZOFRAN) injection 4 mg  4 mg IntraVENous Q6H PRN Price Miranda, DO        polyethylene glycol (GLYCOLAX) packet 17 g  17 g Oral Daily PRN Price Miranda, DO        acetaminophen (TYLENOL) tablet 650 mg  650 mg Oral Q6H PRN Price Miranda,         Or    acetaminophen

## 2025-03-12 NOTE — PROGRESS NOTES
End of Shift Note    Bedside shift change report given to Carlos BONILLAN oncoming nurse) by Mag Palacio RN (offgoing nurse).  Report included the following information SBAR, Kardex, Intake/Output, MAR, Cardiac Rhythm sinus brent/sinus rhythm, and Quality Measures    Shift worked:  Morning     Shift summary and any significant changes:     Pt is alert and ox 4 , on room air   Assisted x 2 with walker .  No complain   Hourly rounding done, Patient needs attended.   Lab work is done   Nephrology consulted   Pt is NPO on 13 th midnight as per cardiology ordered  Pt able to pass urine 100 ml , brief was wet .  Find Pt is retaining urine around 6:30 pm  , bladder scan is done find < 700.   One time straight cath is done , o/p - 925   Residual 9 ml in bladder .     Concerns for physician to address:  none     Zone phone for oncoming shift:   [phone]       Activity:  Level of Assistance: Moderate assist, patient does 50-74%  Number times ambulated in hallways past shift: 0  Number of times OOB to chair past shift: 0    Cardiac:   Cardiac Monitoring: Yes      Cardiac Rhythm: Sinus brent    Access:  Current line(s): PIV     Genitourinary:        Respiratory:   O2 Device: None (Room air)  Chronic home O2 use?: NO  Incentive spirometer at bedside: NO    GI:  Last BM (including prior to admit): 03/10/25  Current diet:  ADULT DIET; Regular; 4 carb choices (60 gm/meal); Low Fat/Low Chol/High Fiber/LUZ ELENA  Diet NPO  Passing flatus: YES    Pain Management:   Patient states pain is manageable on current regimen: YES    Skin:  Eron Scale Score: 18  Interventions: Wound Offloading (Prevention Methods): Elevate heels, Pillows, Repositioning    Patient Safety:  Fall Risk: Nursing Judgement-Fall Risk High(Add Comments): Yes  Fall Risk Interventions  Nursing Judgement-Fall Risk High(Add Comments): Yes  Toilet Every 2 Hours-In Advance of Need: Yes  Hourly Visual Checks: Awake, In chair  Fall Visual Posted: Fall sign posted  Room Door Open:  Yes  Alarm On: Chair  Patient Moved Closer to Nursing Station: No    Active Consults:   IP CONSULT TO CARDIOLOGY  IP CONSULT TO NEPHROLOGY    Length of Stay:  Expected LOS: 2  Actual LOS: 0    Mag Palacio RN

## 2025-03-12 NOTE — PROGRESS NOTES
Hospitalist Progress Note    NAME:   Zeenat Rolle   : 1945   MRN: [id number]     Date/Time: 3/12/2025 4:10 PM  Patient PCP: Lefty Segura MD    Estimated discharge date: 3/13-14  Barriers: PPM      Assessment / Plan:    Active Problems:  Failure to thrive  CA on CKD 3  Normocytic anemia, chronic-slightly worse from baseline  Concern for symptomatic bradycardia  Essential hypertension  Hyperlipidemia     Plan:  Symptomatic bradycardia  HTN  HLD  Cardiology on board  Planning for PPM  Continue with statin, aspirin    CA CKD  Hyperkalemia  1 dose of Lokelma given  Repeat potassium 5.6  Will consult nephrology    Anemia  Follow-up with iron panel and B12    Medical Decision Making:   I personally reviewed labs:yes, CBC, BMP  I personally reviewed imaging:yes  I personally reviewed EKG:yes  Toxic drug monitoring: Yes, monitor platelet count  Discussed case with: Patient, CM, RN        Code Status: full code  DVT Prophylaxis: Lovenox  GI Prophylaxis: None indicated    Subjective:     Chief Complaint / Reason for Physician Visit  \"Patient seen at the bedside  \".  Discussed with RN events overnight.       Objective:     VITALS:   Last 24hrs VS reviewed since prior progress note. Most recent are:  Patient Vitals for the past 24 hrs:   BP Temp Temp src Pulse Resp SpO2 Height Weight   25 0844 (!) 147/52 97.2 °F (36.2 °C) Oral 66 16 93 % -- --   25 0300 125/84 97.5 °F (36.4 °C) Axillary 64 17 98 % -- --   25 0053 (!) 154/60 97 °F (36.1 °C) Oral 61 18 100 % -- --   25 2304 (!) 124/48 98.3 °F (36.8 °C) Oral 56 -- -- -- --   25 2131 (!) 145/48 98.5 °F (36.9 °C) Oral 53 -- -- -- --   25 (!) 119/41 -- -- 56 18 97 % -- --   25 1927 130/63 98.7 °F (37.1 °C) Oral 60 18 98 % 1.6 m (5' 3\") 64.4 kg (142 lb)   25 -- -- -- 53 18 98 % -- --   25 (!) 130/43 -- -- 54 19 -- -- --   25 (!) 130/52 98.7 °F (37.1 °C) Oral (!) 172 22 99 % -- --

## 2025-03-12 NOTE — H&P
Hospitalist Admission Note    NAME:  Zeenat Rolle   :  1945   MRN:  [id number]     Date/Time:  3/11/2025 10:42 PM    Patient PCP: Lefty Segura MD    ______________________________________________________________________  Given the patient's current clinical presentation, I have a high level of concern for decompensation if discharged from the emergency department.  Complex decision making was performed, which includes reviewing the patient's available past medical records, laboratory results, and x-ray films.       My assessment of this patient's clinical condition and my plan of care is as follows.    Assessment / Plan:    Active Problems:  Failure to thrive  CA on CKD 3  Normocytic anemia, chronic-slightly worse from baseline  Concern for symptomatic bradycardia  Essential hypertension  Hyperlipidemia      Plan:  Admit to telemetry monitoring  Continue PTA aspirin, atorvastatin  Trial 500 cc normal saline bolus  Cardiology consulted, greatly appreciate their expertise  -Heart rate ranging 30-50, questioning chronotropic insufficiency leading to weakness/debility noting that BP appears unaffected by heart rate  Trend renal function  Renally dose medications and avoid nephrotoxic agents  Check urinalysis  Anemia profile: Iron panel, B12, reticulocytes      Medical Decision Making:   I personally reviewed labs: Yes, as listed below  I personally reviewed imaging: CT head, CXR  Toxic drug monitoring: None  Discussed case with: ED provider. After discussion I am in agreement that acuity of patient's medical condition necessitates hospital stay.      Code Status: Full  DVT Prophylaxis: Lovenox  GI Prophylaxis: Not indicated  Baseline: Independent    Subjective:   CHIEF COMPLAINT: Weakness    HISTORY OF PRESENT ILLNESS:     Zeenat Rolle is a 79 y.o.  female with PMHx as listed below presenting to the emergency department accompanied by daughter with complaint of 1 week progressively worsening bilateral

## 2025-03-13 LAB
ALBUMIN SERPL-MCNC: 2.3 G/DL (ref 3.5–5)
ALBUMIN/GLOB SERPL: 0.6 (ref 1.1–2.2)
ALP SERPL-CCNC: 163 U/L (ref 45–117)
ALT SERPL-CCNC: 40 U/L (ref 12–78)
ANION GAP SERPL CALC-SCNC: 2 MMOL/L (ref 2–12)
ANION GAP SERPL CALC-SCNC: 3 MMOL/L (ref 2–12)
ANION GAP SERPL CALC-SCNC: 5 MMOL/L (ref 2–12)
AST SERPL-CCNC: 34 U/L (ref 15–37)
BILIRUB SERPL-MCNC: 0.3 MG/DL (ref 0.2–1)
BUN SERPL-MCNC: 23 MG/DL (ref 6–20)
BUN SERPL-MCNC: 24 MG/DL (ref 6–20)
BUN SERPL-MCNC: 24 MG/DL (ref 6–20)
BUN/CREAT SERPL: 16 (ref 12–20)
BUN/CREAT SERPL: 17 (ref 12–20)
BUN/CREAT SERPL: 17 (ref 12–20)
CALCIUM SERPL-MCNC: 8.9 MG/DL (ref 8.5–10.1)
CALCIUM SERPL-MCNC: 9 MG/DL (ref 8.5–10.1)
CALCIUM SERPL-MCNC: 9.6 MG/DL (ref 8.5–10.1)
CHLORIDE SERPL-SCNC: 112 MMOL/L (ref 97–108)
CHLORIDE SERPL-SCNC: 114 MMOL/L (ref 97–108)
CHLORIDE SERPL-SCNC: 117 MMOL/L (ref 97–108)
CO2 SERPL-SCNC: 21 MMOL/L (ref 21–32)
CO2 SERPL-SCNC: 22 MMOL/L (ref 21–32)
CO2 SERPL-SCNC: 23 MMOL/L (ref 21–32)
CREAT SERPL-MCNC: 1.39 MG/DL (ref 0.55–1.02)
CREAT SERPL-MCNC: 1.42 MG/DL (ref 0.55–1.02)
CREAT SERPL-MCNC: 1.46 MG/DL (ref 0.55–1.02)
ERYTHROCYTE [DISTWIDTH] IN BLOOD BY AUTOMATED COUNT: 19.5 % (ref 11.5–14.5)
GLOBULIN SER CALC-MCNC: 3.9 G/DL (ref 2–4)
GLUCOSE BLD STRIP.AUTO-MCNC: 162 MG/DL (ref 65–117)
GLUCOSE BLD STRIP.AUTO-MCNC: 163 MG/DL (ref 65–117)
GLUCOSE BLD STRIP.AUTO-MCNC: 25 MG/DL (ref 65–117)
GLUCOSE BLD STRIP.AUTO-MCNC: 256 MG/DL (ref 65–117)
GLUCOSE BLD STRIP.AUTO-MCNC: 27 MG/DL (ref 65–117)
GLUCOSE BLD STRIP.AUTO-MCNC: 28 MG/DL (ref 65–117)
GLUCOSE BLD STRIP.AUTO-MCNC: 62 MG/DL (ref 65–117)
GLUCOSE BLD STRIP.AUTO-MCNC: 70 MG/DL (ref 65–117)
GLUCOSE BLD STRIP.AUTO-MCNC: 92 MG/DL (ref 65–117)
GLUCOSE SERPL-MCNC: 137 MG/DL (ref 65–100)
GLUCOSE SERPL-MCNC: 39 MG/DL (ref 65–100)
GLUCOSE SERPL-MCNC: 56 MG/DL (ref 65–100)
HCT VFR BLD AUTO: 25.9 % (ref 35–47)
HGB BLD-MCNC: 8.1 G/DL (ref 11.5–16)
MCH RBC QN AUTO: 29.9 PG (ref 26–34)
MCHC RBC AUTO-ENTMCNC: 31.3 G/DL (ref 30–36.5)
MCV RBC AUTO: 95.6 FL (ref 80–99)
NRBC # BLD: 0.08 K/UL (ref 0–0.01)
NRBC BLD-RTO: 1.2 PER 100 WBC
PLATELET # BLD AUTO: 211 K/UL (ref 150–400)
PMV BLD AUTO: 12.6 FL (ref 8.9–12.9)
POTASSIUM SERPL-SCNC: 4.4 MMOL/L (ref 3.5–5.1)
POTASSIUM SERPL-SCNC: 4.5 MMOL/L (ref 3.5–5.1)
POTASSIUM SERPL-SCNC: 8.5 MMOL/L (ref 3.5–5.1)
PROT SERPL-MCNC: 6.2 G/DL (ref 6.4–8.2)
RBC # BLD AUTO: 2.71 M/UL (ref 3.8–5.2)
SERVICE CMNT-IMP: ABNORMAL
SERVICE CMNT-IMP: NORMAL
SERVICE CMNT-IMP: NORMAL
SODIUM SERPL-SCNC: 135 MMOL/L (ref 136–145)
SODIUM SERPL-SCNC: 142 MMOL/L (ref 136–145)
SODIUM SERPL-SCNC: 142 MMOL/L (ref 136–145)
WBC # BLD AUTO: 6.8 K/UL (ref 3.6–11)

## 2025-03-13 PROCEDURE — 6370000000 HC RX 637 (ALT 250 FOR IP): Performed by: STUDENT IN AN ORGANIZED HEALTH CARE EDUCATION/TRAINING PROGRAM

## 2025-03-13 PROCEDURE — 85027 COMPLETE CBC AUTOMATED: CPT

## 2025-03-13 PROCEDURE — 94761 N-INVAS EAR/PLS OXIMETRY MLT: CPT

## 2025-03-13 PROCEDURE — 6360000002 HC RX W HCPCS: Performed by: STUDENT IN AN ORGANIZED HEALTH CARE EDUCATION/TRAINING PROGRAM

## 2025-03-13 PROCEDURE — 96365 THER/PROPH/DIAG IV INF INIT: CPT

## 2025-03-13 PROCEDURE — G0378 HOSPITAL OBSERVATION PER HR: HCPCS

## 2025-03-13 PROCEDURE — 2500000003 HC RX 250 WO HCPCS: Performed by: STUDENT IN AN ORGANIZED HEALTH CARE EDUCATION/TRAINING PROGRAM

## 2025-03-13 PROCEDURE — 2580000003 HC RX 258: Performed by: STUDENT IN AN ORGANIZED HEALTH CARE EDUCATION/TRAINING PROGRAM

## 2025-03-13 PROCEDURE — 2580000003 HC RX 258: Performed by: NURSE PRACTITIONER

## 2025-03-13 PROCEDURE — 93005 ELECTROCARDIOGRAM TRACING: CPT | Performed by: STUDENT IN AN ORGANIZED HEALTH CARE EDUCATION/TRAINING PROGRAM

## 2025-03-13 PROCEDURE — 80048 BASIC METABOLIC PNL TOTAL CA: CPT

## 2025-03-13 PROCEDURE — 51701 INSERT BLADDER CATHETER: CPT

## 2025-03-13 PROCEDURE — 82962 GLUCOSE BLOOD TEST: CPT

## 2025-03-13 PROCEDURE — 80053 COMPREHEN METABOLIC PANEL: CPT

## 2025-03-13 PROCEDURE — 51798 US URINE CAPACITY MEASURE: CPT

## 2025-03-13 PROCEDURE — 96375 TX/PRO/DX INJ NEW DRUG ADDON: CPT

## 2025-03-13 PROCEDURE — 36415 COLL VENOUS BLD VENIPUNCTURE: CPT

## 2025-03-13 RX ORDER — DEXTROSE MONOHYDRATE AND SODIUM CHLORIDE 5; .45 G/100ML; G/100ML
INJECTION, SOLUTION INTRAVENOUS CONTINUOUS
Status: ACTIVE | OUTPATIENT
Start: 2025-03-13 | End: 2025-03-13

## 2025-03-13 RX ORDER — 0.9 % SODIUM CHLORIDE 0.9 %
500 INTRAVENOUS SOLUTION INTRAVENOUS ONCE
Status: DISCONTINUED | OUTPATIENT
Start: 2025-03-13 | End: 2025-03-13

## 2025-03-13 RX ORDER — 0.9 % SODIUM CHLORIDE 0.9 %
500 INTRAVENOUS SOLUTION INTRAVENOUS ONCE
Status: COMPLETED | OUTPATIENT
Start: 2025-03-13 | End: 2025-03-13

## 2025-03-13 RX ORDER — CALCIUM GLUCONATE 20 MG/ML
1000 INJECTION, SOLUTION INTRAVENOUS ONCE
Status: COMPLETED | OUTPATIENT
Start: 2025-03-13 | End: 2025-03-13

## 2025-03-13 RX ORDER — GLUCAGON 1 MG/ML
1 KIT INJECTION PRN
Status: DISCONTINUED | OUTPATIENT
Start: 2025-03-13 | End: 2025-03-17 | Stop reason: SDUPTHER

## 2025-03-13 RX ORDER — FUROSEMIDE 10 MG/ML
20 INJECTION INTRAMUSCULAR; INTRAVENOUS ONCE
Status: COMPLETED | OUTPATIENT
Start: 2025-03-13 | End: 2025-03-13

## 2025-03-13 RX ORDER — DEXTROSE MONOHYDRATE 100 MG/ML
INJECTION, SOLUTION INTRAVENOUS CONTINUOUS PRN
Status: DISCONTINUED | OUTPATIENT
Start: 2025-03-13 | End: 2025-03-14

## 2025-03-13 RX ADMIN — INSULIN HUMAN 10 UNITS: 100 INJECTION, SOLUTION PARENTERAL at 08:08

## 2025-03-13 RX ADMIN — SODIUM ZIRCONIUM CYCLOSILICATE 10 G: 5 POWDER, FOR SUSPENSION ORAL at 08:11

## 2025-03-13 RX ADMIN — SODIUM CHLORIDE 500 ML: 0.9 INJECTION, SOLUTION INTRAVENOUS at 16:08

## 2025-03-13 RX ADMIN — DEXTROSE AND SODIUM CHLORIDE: 5; .45 INJECTION, SOLUTION INTRAVENOUS at 10:48

## 2025-03-13 RX ADMIN — ATORVASTATIN CALCIUM 40 MG: 40 TABLET, FILM COATED ORAL at 09:37

## 2025-03-13 RX ADMIN — SODIUM CHLORIDE 500 ML: 0.9 INJECTION, SOLUTION INTRAVENOUS at 09:03

## 2025-03-13 RX ADMIN — CALCIUM GLUCONATE 1000 MG: 20 INJECTION, SOLUTION INTRAVENOUS at 08:20

## 2025-03-13 RX ADMIN — DEXTROSE 250 ML: 10 SOLUTION INTRAVENOUS at 09:41

## 2025-03-13 RX ADMIN — FUROSEMIDE 20 MG: 10 INJECTION, SOLUTION INTRAMUSCULAR; INTRAVENOUS at 09:02

## 2025-03-13 RX ADMIN — SODIUM CHLORIDE, PRESERVATIVE FREE 10 ML: 5 INJECTION INTRAVENOUS at 09:12

## 2025-03-13 RX ADMIN — DEXTROSE 250 ML: 10 SOLUTION INTRAVENOUS at 08:11

## 2025-03-13 RX ADMIN — ASPIRIN 81 MG: 81 TABLET, COATED ORAL at 09:37

## 2025-03-13 NOTE — CONSULTS
03/13/25        I have been asked to see this patient by Jesenia Tong MD  for advice/opinion re: -----                                                        Assessment:          acute on chronic hyperkalemia  Severe critical hyperkalemia in the morning which is better now  CA on CKD  Anemia  CKD stage III  Urinary retention? Discussion:       Initially  potassium was as elevated as 8.5 in the morning.  Patient had no symptoms.  No EKG signs except tall T waves.  No widening of QRS complex.  Treated medically and repeat potassium was 4.4.  Also with urinary retention- Neri was placed this morning   baseline creatinine appears to be 1.2-1.4    Plan:    agree with placement of Neri catheter.  There might be an underlying urine retention causing type IV RTA.  Lokelma given.  Potassium stabilized                 Thanks for consulting me. Renal service will follow patient with you.Please don't hesitate to contact me if any questions arise of if I can assist in any manner.      Juanita Huitron MD  Cell no- [phone]  Available on perfect serve.          Signed By: Juanita Huitron MD     March 13, 2025           Consult Date: 3/13/2025    Inpatient consult to Nephrology  Consult performed by: Juanita Huitron MD  Consult ordered by: Jesenia Tong MD            Subjective   HISTORY OF PRESENTING ILLNESS :  Zeenat Rolle is a 79 y.o.,female ,Black /  with  past medical history of chronic kidney disease, hypertension, tobacco use, chronic hyperkalemia origin admitted to the hospital for increasing weakness and mild confusion.  Creatinine elevated and potassium and renal consult was placed.  At the time of my evaluation, patient is lethargic and not a good historian.  She specifically denied cramps, palpitations and is not in respiratory  distress    PMH:  Past Medical History:   Diagnosis Date    Achalasia     Femoral bruit     bilateral    GERD (gastroesophageal reflux disease)     Hypercholesterolemia     Hyperparathyroidism     Right leg claudication     Shock (HCC) 08/02/2024    Stenosis of left subclavian artery     Sudden onset of severe headache 12/29/2021    Syncope and collapse 07/07/2024    Vertebral artery stenosis      PSH:  Past Surgical History:   Procedure Laterality Date    COLONOSCOPY  6/2006    [place]- nl- but had hx polyp in 2002    COLONOSCOPY N/A 8/25/2016    COLONOSCOPY performed by Drake Powell MD at [facility] ENDOSCOPY    COLONOSCOPY FLX DX W/COLLJ SPEC WHEN PFRMD  6/6/2011         COLONOSCOPY,REMV LESN,FORCEP/CAUTERY  8/25/2016         COLONOSCOPY,REMV LESN,SNARE  8/25/2016         ESOPHAGOGASTRODUODENOSCOPY TRANSORAL DIAGNOSTIC  6/6/2011         GYN      vaginal deliveries x3    UPPER GI ENDOSCOPY,BALL DIL,30MM  8/28/2017            Social history:   Social History     Tobacco Use    Smoking status: Former     Current packs/day: 0.00     Types: Cigarettes     Quit date: 9/1/2021     Years since quitting: 3.5    Smokeless tobacco: Never   Vaping Use    Vaping status: Never Used   Substance Use Topics    Alcohol use: Yes    Drug use: Never       Family history:  No history of CKD or ESRD in the family.     Allergies   Allergen Reactions    Alendronate Other (See Comments)     Dizziness, tingling, numbness, headaches    Fludrocortisone Other (See Comments)     Severe edema         Current Facility-Administered Medications   Medication Dose Route Frequency Provider Last Rate Last Admin    glucose chewable tablet 16 g  4 tablet Oral PRN Jesenia Tong MD        dextrose bolus 10% 125 mL  125 mL IntraVENous PRN Jesenia Tong MD        Or    dextrose bolus 10% 250 mL  250 mL IntraVENous PRN Jesenia Tong MD   Stopped at 03/13/25 0952    glucagon injection 1 mg  1 mg SubCUTAneous PRN Jesenia Tong MD        dextrose 10 %     Performed by: 121,[id number]     Comprehensive Metabolic Panel    Collection Time: 03/13/25  9:22 AM   Result Value Ref Range    Sodium 142 136 - 145 mmol/L    Potassium 4.4 3.5 - 5.1 mmol/L    Chloride 117 (H) 97 - 108 mmol/L    CO2 22 21 - 32 mmol/L    Anion Gap 3 2 - 12 mmol/L    Glucose 39 (LL) 65 - 100 mg/dL    BUN 23 (H) 6 - 20 MG/DL    Creatinine 1.39 (H) 0.55 - 1.02 MG/DL    BUN/Creatinine Ratio 17 12 - 20      Est, Glom Filt Rate 38 (L) >60 ml/min/1.73m2    Calcium 9.0 8.5 - 10.1 MG/DL    Total Bilirubin 0.3 0.2 - 1.0 MG/DL    ALT 40 12 - 78 U/L    AST 34 15 - 37 U/L    Alk Phosphatase 163 (H) 45 - 117 U/L    Total Protein 6.2 (L) 6.4 - 8.2 g/dL    Albumin 2.3 (L) 3.5 - 5.0 g/dL    Globulin 3.9 2.0 - 4.0 g/dL    Albumin/Globulin Ratio 0.6 (L) 1.1 - 2.2     POCT Glucose    Collection Time: 03/13/25  9:37 AM   Result Value Ref Range    POC Glucose 27 (LL) 65 - 117 mg/dL    Performed by: Barbara Vasquez LPN    POCT Glucose    Collection Time: 03/13/25  9:38 AM   Result Value Ref Range    POC Glucose 28 (LL) 65 - 117 mg/dL    Performed by: Barbara Vasquez LPN    POCT Glucose    Collection Time: 03/13/25  9:40 AM   Result Value Ref Range    POC Glucose 25 (LL) 65 - 117 mg/dL    Performed by: Barbara Vasquez LPN    POCT Glucose    Collection Time: 03/13/25  9:58 AM   Result Value Ref Range    POC Glucose 256 (H) 65 - 117 mg/dL    Performed by: Jorge Harper \"Nia\" RN    POCT Glucose    Collection Time: 03/13/25 10:15 AM   Result Value Ref Range    POC Glucose 162 (H) 65 - 117 mg/dL    Performed by: Jorge Harper \"Nia\" RN         I/O last 3 completed shifts:  In: 1150 [P.O.:650; IV Piggyback:500]  Out: 1384 [Urine:1384]  I/O this shift:  In: -   Out: 450 [Urine:450]     Current Shift: 03/13 0701 - 03/13 1900  In: -   Out: 450 [Urine:450]  Last 3 Shifts: 03/11 1901 - 03/13 0700  In: 1150 [P.O.:650]  Out: 1384 [Urine:1384]  Physical Exam  Constitutional:       Appearance: She is ill-appearing.

## 2025-03-13 NOTE — CARE COORDINATION
Care Management Initial Assessment       RUR: N/A - Observation status   Readmission? No  1st IM letter given? N/A  1st  letter given: N/A    Initial note - 1126 AM: Chart reviewed. CM met with pt at the bedside to introduce self and role. Verified contact information and demographics. Pt resides alone in a two level home with 3 SHILPI. Pt PCP is Dr. Segura with the last visit being within the last month. Preferred pharmacy is Pt has a hx of [facility] services. Pt has no hx of a SNF stay. Pt is independent with ADL's and has no DME needs. Pt has a walker at home. Pt is not an active . Pt has no ACP on file; pt is a FULL code.     CM discussed short term rehab recommendation with pt and pt daughter. Pt stated she would like time to thin about it. CM to send SNF list to pt daughter email at [email or web address]. Full assessment below:     03/13/25 1126   Service Assessment   Patient Orientation Alert and Oriented;Person;Place;Self   Cognition Alert   History Provided By Patient   Primary Caregiver Self   Support Systems Children   Patient's Healthcare Decision Maker is: Legal Next of Kin   PCP Verified by CM Yes  (Dr. Segura)   Last Visit to PCP Within last 3 months   Prior Functional Level Independent in ADLs/IADLs   Current Functional Level Assistance with the following:;Mobility   Can patient return to prior living arrangement Yes   Ability to make needs known: Good   Family able to assist with home care needs: Yes   Would you like for me to discuss the discharge plan with any other family members/significant others, and if so, who? Yes  (Jonathon Francis (daughter), [phone])   Financial Resources Medicare  ([employer] Medicare)   Community Resources None   Social/Functional History   Lives With Alone   Type of Home House   Home Layout Two level   Home Access Stairs to enter with rails   Entrance Stairs - Number of Steps 3 SHILPI   Home Equipment Walker - Rolling   Receives Help From Family   Prior Level  of Assist for ADLs Independent   Prior Level of Assist for Homemaking Independent   Ambulation Assistance Independent   Prior Level of Assist for Transfers Independent   Active  No   Patient's  Info Pt daugther   Mode of Transportation Car   Occupation Retired   Discharge Planning   Type of Residence House   Living Arrangements Alone   Current Services Prior To Admission Home Care   Potential Assistance Needed Home Care;Skilled Nursing Facility   DME Ordered? No   Potential Assistance Purchasing Medications No   Type of Home Care Services PT;OT;Nursing Services   Patient expects to be discharged to: Other (comment)  (Home with HH vs. SNF)     Advance Care Planning     General Advance Care Planning (ACP) Conversation    Date of Conversation: 3/13/2025  Conducted with: Patient with Decision Making Capacity  Other persons present: None    Healthcare Decision Maker:   Primary Decision Maker: Jonathon Francis - [facility] - [phone]     Today we documented Decision Maker(s) consistent with Legal Next of Kin hierarchy.  Content/Action Overview:  Has NO ACP documents-Information provided  Reviewed DNR/DNI and patient elects Full Code (Attempt Resuscitation)      Length of Voluntary ACP Conversation in minutes:  <16 minutes (Non-Billable)    MARLI ABERNATHY   [phone]

## 2025-03-13 NOTE — PROGRESS NOTES
Hospitalist Progress Note    NAME:   Zeenat Rolle   : 1945   MRN: [id number]     Date/Time: 3/13/2025 1:15 PM  Patient PCP: Lefty Segura MD    Estimated discharge date: 3/13-14  Barriers: PPM      Assessment / Plan:    Active Problems:  Failure to thrive  CA on CKD 3  Normocytic anemia, chronic-slightly worse from baseline  Concern for symptomatic bradycardia  Essential hypertension  Hyperlipidemia     Plan:  Symptomatic bradycardia  HTN  HLD  Cardiology on board  Planning for PPM  Continue with statin, aspirin    CA CKD  Hyperkalemia  1 dose of Lokelma given  Repeat potassium 5.6  Will consult nephrology  3/13  Patient potassium 8.5, bradycardia, and peak T wave  Stat hyperkalemia order, D10, insulin, calcium gluconate ordered  Lokelma and Lasix added  Discussed with nephrology too  Repeat K is 4.4  Recheck again in 4 hours    Anemia  Follow-up with iron panel and B12    Medical Decision Making:   I personally reviewed labs:yes, CBC, BMP  I personally reviewed imaging:yes  I personally reviewed EKG:yes  Toxic drug monitoring: Yes, monitor platelet count  Discussed case with: Patient, CM, RN        Code Status: full code  DVT Prophylaxis: Lovenox  GI Prophylaxis: None indicated    Subjective:     Chief Complaint / Reason for Physician Visit  \"Patient seen at the bedside  \".  Discussed with RN events overnight.       Objective:     VITALS:   Last 24hrs VS reviewed since prior progress note. Most recent are:  Patient Vitals for the past 24 hrs:   BP Temp Temp src Pulse Resp SpO2   25 1005 (!) 127/48 97.7 °F (36.5 °C) Oral 60 15 96 %   25 0903 (!) 116/54 -- -- 62 16 93 %   25 0902 (!) 116/54 -- -- -- -- --   25 0807 (!) 136/52 -- -- 56 -- 95 %   25 0415 -- -- Oral -- -- --   25 119/60 98 °F (36.7 °C) Oral 60 17 --   25 1633 112/61 98.2 °F (36.8 °C) Oral 62 18 98 %         Intake/Output Summary (Last 24 hours) at 3/13/2025 1315  Last data filed at  3/13/2025 1103  Gross per 24 hour   Intake 150 ml   Output 1834 ml   Net -1684 ml        I had a face to face encounter and independently examined this patient on 3/13/2025, as outlined below:  PHYSICAL EXAM:  General: Alert, cooperative  EENT:  EOMI. Anicteric sclerae.  Resp:  CTA bilaterally, no wheezing or rales.  No accessory muscle use  CV:  Regular  rhythm,  No edema  GI:  Soft, Non distended, Non tender.  +Bowel sounds  Neurologic:  Alert and oriented X 3, normal speech,   Psych:   Good insight. Not anxious nor agitated  Skin:  No rashes.  No jaundice    Reviewed most current lab test results and cultures  YES  Reviewed most current radiology test results   YES  Review and summation of old records today    NO  Reviewed patient's current orders and MAR    YES  PMH/SH reviewed - no change compared to H&P    Procedures: see electronic medical records for all procedures/Xrays and details which were not copied into this note but were reviewed prior to creation of Plan.      LABS:  I reviewed today's most current labs and imaging studies.  Pertinent labs include:  Recent Labs     03/11/25 2013 03/12/25  0528 03/13/25 0457   WBC 6.2 9.5 6.8   HGB 7.7* 7.5* 8.1*   HCT 24.7* 23.4* 25.9*    191 211     Recent Labs     03/11/25 2013 03/12/25  0528 03/12/25  1330 03/13/25  0457 03/13/25  0922      < > 139 135* 142   K 5.4*   < > 5.6* 8.5* 4.4   *   < > 114* 112* 117*   CO2 23   < > 22 21 22   GLUCOSE 113*   < > 67 56* 39*   BUN 28*   < > 21* 24* 23*   CREATININE 1.57*   < > 1.45* 1.46* 1.39*   CALCIUM 9.4   < > 9.3 9.6 9.0   BILITOT 0.2  --   --   --  0.3   AST 41*  --   --   --  34   ALT 48  --   --   --  40    < > = values in this interval not displayed.       Signed: Jesenia Tong MD

## 2025-03-13 NOTE — PROGRESS NOTES
[facility]     Progress Note      3/13/2025 8:50 AM  NAME: Zeenat Rolle   MRN:  [id number]   Admit Diagnosis: Bradycardia [R00.1]  Other fatigue [R53.83]     Problem List:     --Sinus Bradycardia  History of Syncope  Anemia (7.5)  Hyperkalemia (has had this)  Dyslipidemia  CKD 3 ([place])  Hypoalbuminemia  Achalasia  GERD  PAD, Subclavian stenoisis  Former tobacco  Full Code  ,Lives alone, daughter visits daily  -t/-e/-s      Assessment/Plan:     --EKG with SB rate 58 bpm,  ms, QRS 82 ms, Qtc 412 ms. Tele with SSS and pauses noted overnight to around 32. Avoid AV Jack blockers. She was offered PPM last fall and turned down. TTE with preserved LVEF and no sig valvular disease.   Her hemoglobin of 7.7 is contributing to her extreme fatigue. This will need to be worked up. She denies bleeding. She required unit of PRBCs in 8/24 for same.  Had a long conversation with daughter and son. I think permanent pacemaker is reasonable as she is bradycardic and it is very likely that her fatigue will improve follwing a pacemaker. I will plan to tentatively do this on Friday. She will likely needs a single chamber pacemaker only. Risks benefits and alternatives discussed at length with daughter and son. Daughter is her power of .   3/13 update: I spoke with patient, nursing and daughter. Will plan on PPM today. Patient is unable to sign consent due to hand weakness, daughter will be in at 11. Nurse will recheck BMP as right now potassium is too high to proceed.    Addendum: PPM will not be performed due to more acute issues        [x]       High complexity decision making was performed in this patient at high risk for decompensation with multiple organ involvement.    We discussed the expected course, resolution and complications of the diagnoses in detail.  Medication risk, benefits, costs, interactions, and alternatives were discussed as indicated.  I advised him to contact the

## 2025-03-13 NOTE — PROGRESS NOTES
Attempted to see pt for OT services. Pt K+ is 8.5 and pt is getting ready to transfer to a higher level of care due to current medical status.  Will defer but continue to follow.

## 2025-03-13 NOTE — PROGRESS NOTES
TRANSFER - IN REPORT:    Verbal report received from CRISTÓBAL Hathaway on Zeenat Rolle  being received from Medical Telemetry for change in patient condition (hypoglycemia, increaed K+)      Report consisted of patient's Situation, Background, Assessment and   Recommendations(SBAR).     Information from the following report(s) Nurse Handoff Report, Med Rec Status, and Cardiac Rhythm sinus brent  was reviewed with the receiving nurse.    Opportunity for questions and clarification was provided.      Assessment completed upon patient's arrival to unit and care assumed.       End of Shift Note    Bedside shift report given to CRISTÓBAL Brooks. Report included the following information Nurse Handoff Report, MAR, Telemetry status, Recent Results, and plan of care. Oncoming RN verbalized understanding of plan of care with opportunity for clarification and questions. Dual skin check performed at this time.       Shift worked:  1725 - 1930     Shift summary and any significant changes:     Patient admitted to unit; VS and assessment completed, see flowsheet. D5 + 1/2 NS started per order, see MAR. Pt OOB to BSC with x2 assist/walker/gaitbelt; patient very weak. Monitoring glucoses, labs.     Continuous fluids discontinued and diet order reinstated. Plan for pacemaker tomorrow.      Patient hypotensive with VS recheck; denied dizziness, lightheadedness. Orders received, see MAR.     Daughter updated on transfer to unit and plan of care with all questions/concerns addressed.    Concerns for physician to address:  none     Zone phone for oncoming shift:          Activity:  Level of Assistance: Moderate assist, patient does 50-74%  Number times ambulated in hallways past shift: 0  Number of times OOB to chair past shift: 4    Cardiac:   Cardiac Monitoring: Yes      Cardiac Rhythm: Sinus brent, Sinus rhythm    Access:  Current line(s): PIV     Genitourinary:   Urinary Status: Retention    Respiratory:   O2 Device: None (Room air)  Chronic home  O2 use?: N/A  Incentive spirometer at bedside: N/A    GI:  Last BM (including prior to admit): 03/13/25  Current diet:  Diet NPO Exceptions are: Sips of Water with Meds  Passing flatus: YES    Pain Management:   Patient states pain is manageable on current regimen: YES    Skin:  Eron Scale Score: 15  Interventions: Wound Offloading (Prevention Methods): Bed, pressure reduction mattress, Pillows, Repositioning, Turning    Patient Safety:  Fall Risk: Nursing Judgement-Fall Risk High(Add Comments): Yes  Fall Risk Interventions  Nursing Judgement-Fall Risk High(Add Comments): Yes  Toilet Every 2 Hours-In Advance of Need: Yes  Hourly Visual Checks: Awake, In bed  Fall Visual Posted: Socks, Fall sign posted, Armband  Room Door Open: Yes  Alarm On: Bed  Patient Moved Closer to Nursing Station: No    Active Consults:   IP CONSULT TO CARDIOLOGY  IP CONSULT TO NEPHROLOGY    Length of Stay:  Expected LOS: 4  Actual LOS: 0    MIK SALGADO RN

## 2025-03-13 NOTE — PLAN OF CARE
Problem: Discharge Planning  Goal: Discharge to home or other facility with appropriate resources  Outcome: Progressing     Problem: Safety - Adult  Goal: Free from fall injury  Outcome: Progressing     Problem: Respiratory - Adult  Goal: Achieves optimal ventilation and oxygenation  Outcome: Progressing     Problem: Cardiovascular - Adult  Goal: Maintains optimal cardiac output and hemodynamic stability  Outcome: Progressing  Goal: Absence of cardiac dysrhythmias or at baseline  Outcome: Progressing     Problem: Musculoskeletal - Adult  Goal: Return mobility to safest level of function  Outcome: Progressing  Goal: Maintain proper alignment of affected body part  Outcome: Progressing  Goal: Return ADL status to a safe level of function  Outcome: Progressing     Problem: Hematologic - Adult  Goal: Maintains hematologic stability  Outcome: Progressing     Problem: Skin/Tissue Integrity  Goal: Skin integrity remains intact  Description: 1.  Monitor for areas of redness and/or skin breakdown  2.  Assess vascular access sites hourly  3.  Every 4-6 hours minimum:  Change oxygen saturation probe site  4.  Every 4-6 hours:  If on nasal continuous positive airway pressure, respiratory therapy assess nares and determine need for appliance change or resting period  Outcome: Progressing  Flowsheets (Taken 3/13/2025 1005)  Skin Integrity Remains Intact: Monitor for areas of redness and/or skin breakdown

## 2025-03-13 NOTE — PROGRESS NOTES
Deliver verbally explain the MOON/VOON with patient daughter. Patient daughter signed JONES notice. Heydi Wright, Care Management Assistant

## 2025-03-13 NOTE — PROGRESS NOTES
Physical Therapy  Chart reviewed. Patient with elevated K and hypoglycemic requiring transfer to higher level of care.  Will defer therapy today and follow back tomorrow as appropriate.  Thank you,  Albania Escamilla, PT

## 2025-03-14 PROBLEM — R00.1 SYMPTOMATIC BRADYCARDIA: Status: ACTIVE | Noted: 2025-03-14

## 2025-03-14 LAB
ANION GAP SERPL CALC-SCNC: 4 MMOL/L (ref 2–12)
BUN SERPL-MCNC: 25 MG/DL (ref 6–20)
BUN/CREAT SERPL: 18 (ref 12–20)
CALCIUM SERPL-MCNC: 9.1 MG/DL (ref 8.5–10.1)
CHLORIDE SERPL-SCNC: 114 MMOL/L (ref 97–108)
CO2 SERPL-SCNC: 23 MMOL/L (ref 21–32)
CREAT SERPL-MCNC: 1.36 MG/DL (ref 0.55–1.02)
EKG ATRIAL RATE: 61 BPM
EKG DIAGNOSIS: NORMAL
EKG P AXIS: 56 DEGREES
EKG P-R INTERVAL: 190 MS
EKG Q-T INTERVAL: 390 MS
EKG QRS DURATION: 94 MS
EKG QTC CALCULATION (BAZETT): 392 MS
EKG R AXIS: 62 DEGREES
EKG T AXIS: 84 DEGREES
EKG VENTRICULAR RATE: 61 BPM
ERYTHROCYTE [DISTWIDTH] IN BLOOD BY AUTOMATED COUNT: 18.4 % (ref 11.5–14.5)
ERYTHROCYTE [DISTWIDTH] IN BLOOD BY AUTOMATED COUNT: 18.7 % (ref 11.5–14.5)
GLUCOSE BLD STRIP.AUTO-MCNC: 107 MG/DL (ref 65–117)
GLUCOSE BLD STRIP.AUTO-MCNC: 215 MG/DL (ref 65–117)
GLUCOSE BLD STRIP.AUTO-MCNC: 76 MG/DL (ref 65–117)
GLUCOSE BLD STRIP.AUTO-MCNC: 85 MG/DL (ref 65–117)
GLUCOSE SERPL-MCNC: 118 MG/DL (ref 65–100)
HCT VFR BLD AUTO: 21.5 % (ref 35–47)
HCT VFR BLD AUTO: 21.8 % (ref 35–47)
HGB BLD-MCNC: 6.8 G/DL (ref 11.5–16)
HGB BLD-MCNC: 7 G/DL (ref 11.5–16)
MCH RBC QN AUTO: 29.7 PG (ref 26–34)
MCH RBC QN AUTO: 30 PG (ref 26–34)
MCHC RBC AUTO-ENTMCNC: 31.6 G/DL (ref 30–36.5)
MCHC RBC AUTO-ENTMCNC: 32.1 G/DL (ref 30–36.5)
MCV RBC AUTO: 93.6 FL (ref 80–99)
MCV RBC AUTO: 93.9 FL (ref 80–99)
NRBC # BLD: 0.07 K/UL (ref 0–0.01)
NRBC # BLD: 0.07 K/UL (ref 0–0.01)
NRBC BLD-RTO: 0.9 PER 100 WBC
NRBC BLD-RTO: 1.1 PER 100 WBC
PLATELET # BLD AUTO: 170 K/UL (ref 150–400)
PLATELET # BLD AUTO: 177 K/UL (ref 150–400)
PMV BLD AUTO: 11.1 FL (ref 8.9–12.9)
PMV BLD AUTO: 11.3 FL (ref 8.9–12.9)
POTASSIUM SERPL-SCNC: 5.1 MMOL/L (ref 3.5–5.1)
RBC # BLD AUTO: 2.29 M/UL (ref 3.8–5.2)
RBC # BLD AUTO: 2.33 M/UL (ref 3.8–5.2)
SERVICE CMNT-IMP: ABNORMAL
SERVICE CMNT-IMP: NORMAL
SODIUM SERPL-SCNC: 141 MMOL/L (ref 136–145)
VIT B12 SERPL-MCNC: 943 PG/ML (ref 232–1245)
WBC # BLD AUTO: 6.2 K/UL (ref 3.6–11)
WBC # BLD AUTO: 7.6 K/UL (ref 3.6–11)

## 2025-03-14 PROCEDURE — 2500000003 HC RX 250 WO HCPCS: Performed by: STUDENT IN AN ORGANIZED HEALTH CARE EDUCATION/TRAINING PROGRAM

## 2025-03-14 PROCEDURE — 6370000000 HC RX 637 (ALT 250 FOR IP): Performed by: STUDENT IN AN ORGANIZED HEALTH CARE EDUCATION/TRAINING PROGRAM

## 2025-03-14 PROCEDURE — 30233N1 TRANSFUSION OF NONAUTOLOGOUS RED BLOOD CELLS INTO PERIPHERAL VEIN, PERCUTANEOUS APPROACH: ICD-10-PCS | Performed by: STUDENT IN AN ORGANIZED HEALTH CARE EDUCATION/TRAINING PROGRAM

## 2025-03-14 PROCEDURE — 36415 COLL VENOUS BLD VENIPUNCTURE: CPT

## 2025-03-14 PROCEDURE — 80048 BASIC METABOLIC PNL TOTAL CA: CPT

## 2025-03-14 PROCEDURE — 97530 THERAPEUTIC ACTIVITIES: CPT

## 2025-03-14 PROCEDURE — 82962 GLUCOSE BLOOD TEST: CPT

## 2025-03-14 PROCEDURE — 97535 SELF CARE MNGMENT TRAINING: CPT

## 2025-03-14 PROCEDURE — 6360000002 HC RX W HCPCS: Performed by: STUDENT IN AN ORGANIZED HEALTH CARE EDUCATION/TRAINING PROGRAM

## 2025-03-14 PROCEDURE — G0378 HOSPITAL OBSERVATION PER HR: HCPCS

## 2025-03-14 PROCEDURE — 51798 US URINE CAPACITY MEASURE: CPT

## 2025-03-14 PROCEDURE — 85027 COMPLETE CBC AUTOMATED: CPT

## 2025-03-14 PROCEDURE — 2060000000 HC ICU INTERMEDIATE R&B

## 2025-03-14 RX ORDER — GLUCAGON 1 MG/ML
1 KIT INJECTION PRN
Status: DISCONTINUED | OUTPATIENT
Start: 2025-03-14 | End: 2025-03-22 | Stop reason: HOSPADM

## 2025-03-14 RX ORDER — DEXTROSE MONOHYDRATE 100 MG/ML
INJECTION, SOLUTION INTRAVENOUS CONTINUOUS PRN
Status: DISCONTINUED | OUTPATIENT
Start: 2025-03-14 | End: 2025-03-22 | Stop reason: HOSPADM

## 2025-03-14 RX ORDER — GLIPIZIDE 10 MG/1
1 TABLET ORAL EVERY 4 HOURS PRN
Status: DISCONTINUED | OUTPATIENT
Start: 2025-03-14 | End: 2025-03-22 | Stop reason: HOSPADM

## 2025-03-14 RX ADMIN — SODIUM CHLORIDE, PRESERVATIVE FREE 10 ML: 5 INJECTION INTRAVENOUS at 10:34

## 2025-03-14 RX ADMIN — IRON SUCROSE 100 MG: 20 INJECTION, SOLUTION INTRAVENOUS at 11:09

## 2025-03-14 RX ADMIN — SODIUM CHLORIDE, PRESERVATIVE FREE 10 ML: 5 INJECTION INTRAVENOUS at 20:56

## 2025-03-14 RX ADMIN — ASPIRIN 81 MG: 81 TABLET, COATED ORAL at 10:33

## 2025-03-14 RX ADMIN — ATORVASTATIN CALCIUM 40 MG: 40 TABLET, FILM COATED ORAL at 10:33

## 2025-03-14 RX ADMIN — ACETAMINOPHEN 650 MG: 325 TABLET ORAL at 22:15

## 2025-03-14 RX ADMIN — ENOXAPARIN SODIUM 30 MG: 100 INJECTION SUBCUTANEOUS at 10:33

## 2025-03-14 ASSESSMENT — PAIN DESCRIPTION - LOCATION: LOCATION: ARM;KNEE

## 2025-03-14 NOTE — PROGRESS NOTES
End of Shift Note    Bedside shift change report given to Ghazala AMBROCIO (oncoming nurse) by Heydi Ttutle RN (offgoing nurse).  Report included the following information SBAR, Kardex, Procedure Summary, Intake/Output, MAR, Recent Results, Med Rec Status, Cardiac Rhythm NSR, and Alarm Parameters     Shift worked:  1071-9988     Shift summary and any significant changes:     Pt pacemaker has been postponed to next week due to HgB 7, K 5.1, and hypothermia      Concerns for physician to address:  none     Zone phone for oncoming shift:   N/A       Activity:  Level of Assistance: Moderate assist, patient does 50-74%  Number times ambulated in hallways past shift: 0  Number of times OOB to chair past shift: 1      Cardiac:   Cardiac Monitoring: Yes      Cardiac Rhythm: Sinus rhythm    Access:  Current line(s): PIV    Genitourinary:   Urinary Status: Voiding    Respiratory:   O2 Device: None (Room air)  Chronic home O2 use?: no  Incentive spirometer at bedside: NO    GI:  Last BM (including prior to admit): 03/13/25  Current diet:  ADULT DIET; Regular; Low Potassium (Less than 3000 mg/day)  Passing flatus: YES    Pain Management:   Patient states pain is manageable on current regimen: YES    Skin:  Eron Scale Score: 17  Interventions: Wound Offloading (Prevention Methods): Elevate heels, Repositioning, Pillows    Patient Safety:  Fall Risk: Nursing Judgement-Fall Risk High(Add Comments): Yes  Fall Risk Interventions  Nursing Judgement-Fall Risk High(Add Comments): Yes  Toilet Every 2 Hours-In Advance of Need: Yes  Hourly Visual Checks: In bed  Fall Visual Posted: Armband, Socks  Room Door Open: Yes  Alarm On: Bed  Patient Moved Closer to Nursing Station: No    Active Consults:   IP CONSULT TO CARDIOLOGY  IP CONSULT TO NEPHROLOGY  IP CONSULT TO UROLOGY    Length of Stay:  Expected LOS: 8  Actual LOS: 0    Heydi Tuttle RN

## 2025-03-14 NOTE — CONSULTS
[facility]        UROLOGY CONSULT NOTE     Patient: Zeenat Rolle MRN: [id number]  PCP: Lefty Segura MD   :     1945  Age:   79 y.o.  Sex:  female      Requesting Provider: Jesenia Tong MD  Reason for consultation: 79 y.o. female with Bradycardia [R00.1]  Other fatigue [R53.83]  Symptomatic bradycardia [R00.1]    Admission Date: 3/11/2025  7:01 PM  LOS: 0 days     Code Status: Full Code   PCP: Lefty Segura MD  - [phone]   Emergency Contact:  Primary Emergency Contact: TitoJonathon, Home Phone: [phone]     Assessment:   Transient urinary retention- requiring straight cath x 2- now voiding w/o issues and low PVR (6 ml)  CA on CKD: creatinine at baseline  Hyperkalemia: resolved  Bradycardia s/p Pacemaker 3/12    Recommendations:     Voiding w/o issues per primary nurse, no further retention.  Bladder scan if > 350 cc then place vasquez catheter. If catheter needed then keep in place for 5 days then can attempt voiding trial.    Thank you for this consult. Urology will sign off at this time, please call with questions or concerns if needed.    Reviewed with supervising MD: Dr. Velasquez  History of Present Illness:     Zeenat Rolle is a 79 y.o.   female is seen in consultation for urinary retention at the request of Jesenia Tong MD. The patient presented on 3/11 fatigue and weakness- patient noted to have mild CA (1.5) with hyperkalemia (6.0), Anemia (7.0) and bradycardia.  She was seen by Nephrology and treatment for her hyperkalemia. Cardiology saw the patient with pacemaker placement on 3/12.  The patient was noted to have urinary retention post procedure regarding straight cath x 2 ( last time 3/17/25). The patient is currently voiding per primary RN without any issues. Last bladder scan documented with 6 ml.   Her creatinine is improved 1.3 mg/dl, Hgb 7.0    There is no abdominal imagining for review last renal US 2024  Cancer Brother         Stomach Cancer        Objective:    Vitals:    Vitals:    03/14/25 0530 03/14/25 0700 03/14/25 1000 03/14/25 1100   BP:  (!) 120/44 (!) 114/42 99/60   Pulse:  66 73 68   Resp:  18 18 19   Temp: 99.4 °F (37.4 °C) 98.3 °F (36.8 °C)  97.8 °F (36.6 °C)   TempSrc: Oral Oral  Oral   SpO2:  95%  96%   Weight:       Height:         I&O's:  03/13 0701 - 03/14 0700  In: 500 [P.O.:500]  Out: 1250 [Urine:1250]  BP 99/60   Pulse 68   Temp 97.8 °F (36.6 °C) (Oral)   Resp 19   Ht 1.6 m (5' 3\")   Wt 64.4 kg (142 lb)   SpO2 96%   BMI 25.15 kg/m²     LABS:  Recent Labs     03/14/25  0235 03/13/25  1613 03/13/25  0922    142 142   K 5.1 4.5 4.4   * 114* 117*   CO2 23 23 22   BUN 25* 24* 23*   CREATININE 1.36* 1.42* 1.39*   CALCIUM 9.1 8.9 9.0     Recent Labs     03/14/25  0353 03/14/25  0235 03/13/25  0457   WBC 7.6 6.2 6.8   HGB 7.0* 6.8* 8.1*   HCT 21.8* 21.5* 25.9*    170 211       IMAGING  === 03/11/25 ===    CT HEAD WO CONTRAST    - Narrative -  EXAM: CT HEAD WO CONTRAST    CLINICAL HISTORY: confusion  INDICATION: confusion  COMPARISON: 2024.  CT dose reduction was achieved through use of a standardized protocol tailored  for this examination and automatic exposure control for dose modulation.  TECHNIQUE: Serial axial images with a collimation of 5 mm were obtained from the  skull base through the vertex.  Serial axial images with a collimation of 5 mm  were obtained from the skull base through the vertex.  Sagittal and coronal  reformatted images also obtained.    FINDINGS:  The sulci and ventricles are within normal limits for patient age. There is no  evidence of an acute infarction, hemorrhage, or mass-effect. There is no  evidence of midline shift or hydrocephalus. Posterior fossa structures are  unremarkable. No extra-axial collections are seen.  Mastoid air cells are well pneumatized and clear.  There is no evidence of depressed skull fractures of soft tissue swelling.    -

## 2025-03-14 NOTE — PLAN OF CARE
Problem: Occupational Therapy - Adult  Goal: By Discharge: Performs self-care activities at highest level of function for planned discharge setting.  See evaluation for individualized goals.  Description: FUNCTIONAL STATUS PRIOR TO ADMISSION:  Patient was ambulatory using RW and states she was Mod Indep with ADLs, living alone, with daughter checking in with her daily.   ,  ,  ,  ,  ,  ,  ,  ,  ,  ,       HOME SUPPORT: Patient lived alone with local daughter checking in with patient on a daily basis.    Occupational Therapy Goals:  Initiated 3/12/2025  1.  Patient will perform seated self-feeding with Set-up within 7 day(s).  2.  Patient will perform seated grooming with Set-up within 7 day(s).  3.  Patient will perform seated bathing with Minimal Assist within 7 day(s).  4.  Patient will perform RW toilet transfers with Contact Guard Assist  within 7 day(s).  5.  Patient will perform all aspects of RW toileting with Contact Guard Assist within 7 day(s).      Outcome: Progressing   OCCUPATIONAL THERAPY TREATMENT  Patient: Zeenat Rolle (79 y.o. female)  Date: 3/14/2025  Primary Diagnosis: Bradycardia [R00.1]  Other fatigue [R53.83]  Symptomatic bradycardia [R00.1]  Procedure(s) (LRB):  Insert PPM dual (N/A) 1 Day Post-Op   Precautions: Fall Risk                Chart, occupational therapy assessment, plan of care, and goals were reviewed.    ASSESSMENT  Patient continues to benefit from skilled OT services and is progressing towards goals. Patient is received resting in bed, amenable to session with increased time needed for processing and response. Patient successfully takes sidesteps to chair with min assist x2, benefiting from RW support with several instances soft BLE buckling. She demonstrates improved fine motor dexterity and  strength during seated grooming, managing supplies with occasional stabilizing assist to prevent spillage. Although BP soft, remains stable throughout with patient tolerating

## 2025-03-14 NOTE — PROGRESS NOTES
Hospitalist Progress Note    NAME:   Zeenat Rolle   : 1945   MRN: [id number]     Date/Time: 3/14/2025 12:40 PM  Patient PCP: Lefty Segura MD    Estimated discharge date: 3/13-14  Barriers: PPM      Assessment / Plan:    Active Problems:  Failure to thrive  CA on CKD 3  Normocytic anemia, chronic-slightly worse from baseline  Concern for symptomatic bradycardia  Essential hypertension  Hyperlipidemia     Plan:  Symptomatic bradycardia  HTN  HLD  Cardiology on board  Planning for PPM  Continue with statin, aspirin  3/14  PPM implanted for earlier this week  Hemoglobin stability  Transfused if hemoglobin less than 7    CA CKD  Hyperkalemia  1 dose of Lokelma given  Repeat potassium 5.6  Will consult nephrology  3/13  Patient potassium 8.5, bradycardia, and peak T wave  Stat hyperkalemia order, D10, insulin, calcium gluconate ordered  Lokelma and Lasix added  Discussed with nephrology too  Repeat K is 4.4  Recheck again in 4 hours  3/14  Low potassium diet  Potassium today 5.1  Neri catheter placed for retention  Urology consult    Anemia  Follow-up with iron panel and B12  IV iron    Medical Decision Making:   I personally reviewed labs:yes, CBC, BMP  I personally reviewed imaging:yes  I personally reviewed EKG:yes  Toxic drug monitoring: Yes, monitor platelet count  Discussed case with: Patient, CM, RN        Code Status: full code  DVT Prophylaxis: Lovenox  GI Prophylaxis: None indicated    Subjective:     Chief Complaint / Reason for Physician Visit  \"Patient seen at the bedside  \".  Discussed with RN events overnight.   Discussed with the patient daughter at the bedside    Objective:     VITALS:   Last 24hrs VS reviewed since prior progress note. Most recent are:  Patient Vitals for the past 24 hrs:   BP Temp Temp src Pulse Resp SpO2   25 1100 99/60 97.8 °F (36.6 °C) Oral 68 19 96 %   25 1000 (!) 114/42 -- -- 73 18 --   25 0700 (!) 120/44 98.3 °F (36.8 °C) Oral 66 18 95 %

## 2025-03-14 NOTE — PLAN OF CARE
Problem: Discharge Planning  Goal: Discharge to home or other facility with appropriate resources  3/14/2025 1251 by Heydi Tuttle RN  Outcome: Progressing  3/14/2025 1251 by Heydi Tuttle RN  Outcome: Progressing  3/14/2025 0213 by Ghazala Barcenas RN  Outcome: Progressing     Problem: Safety - Adult  Goal: Free from fall injury  3/14/2025 1251 by Heydi Tuttle RN  Outcome: Progressing  3/14/2025 1251 by Heydi Tuttle RN  Outcome: Progressing  3/14/2025 0213 by Ghazala Barcenas RN  Outcome: Progressing     Problem: Respiratory - Adult  Goal: Achieves optimal ventilation and oxygenation  3/14/2025 1251 by Heydi Tuttle RN  Outcome: Progressing  3/14/2025 0213 by Ghazala Barcenas RN  Outcome: Progressing     Problem: Cardiovascular - Adult  Goal: Maintains optimal cardiac output and hemodynamic stability  3/14/2025 1251 by Heydi Tuttle RN  Outcome: Progressing  3/14/2025 0213 by Ghazala Barcenas RN  Outcome: Progressing  Goal: Absence of cardiac dysrhythmias or at baseline  3/14/2025 1251 by Heydi Tuttle RN  Outcome: Progressing  3/14/2025 0213 by Ghazala Barcenas RN  Outcome: Progressing     Problem: Musculoskeletal - Adult  Goal: Return mobility to safest level of function  3/14/2025 1251 by Heydi Tuttle RN  Outcome: Progressing  3/14/2025 0213 by Ghazala Barcenas RN  Outcome: Progressing  Goal: Maintain proper alignment of affected body part  3/14/2025 1251 by Heydi uTttle RN  Outcome: Progressing  3/14/2025 0213 by Ghazala Barcenas RN  Outcome: Progressing  Goal: Return ADL status to a safe level of function  Outcome: Progressing     Problem: Hematologic - Adult  Goal: Maintains hematologic stability  Outcome: Progressing     Problem: Skin/Tissue Integrity  Goal: Skin integrity remains intact  Description: 1.  Monitor for areas of redness and/or skin breakdown  2.  Assess vascular access sites hourly  3.  Every 4-6 hours minimum:  Change oxygen saturation probe site  4.  Every

## 2025-03-14 NOTE — PLAN OF CARE
Predictive Model Details          42 (Caution)  Factor Value    Calculated 3/14/2025 02:14 30% Age 79 years old    Deterioration Index Model 21% Neurological exam X     15% Respiratory rate 13     9% Cardiac rhythm Sinus brent;Sinus arrythmia     8% Systolic 159     6% Potassium 4.5 mmol/L     5% Hematocrit abnormal (25.9 %)     2% BUN abnormal (24 MG/DL)     2% Sodium 142 mmol/L     2% Pulse 57     0% Temperature 97.4 °F (36.3 °C)     0% Pulse oximetry 95 %     0% WBC count 6.8 K/uL        Problem: Discharge Planning  Goal: Discharge to home or other facility with appropriate resources  Outcome: Progressing     Problem: Safety - Adult  Goal: Free from fall injury  Outcome: Progressing     Problem: Respiratory - Adult  Goal: Achieves optimal ventilation and oxygenation  Outcome: Progressing     Problem: Cardiovascular - Adult  Goal: Maintains optimal cardiac output and hemodynamic stability  Outcome: Progressing  Goal: Absence of cardiac dysrhythmias or at baseline  Outcome: Progressing     Problem: Musculoskeletal - Adult  Goal: Return mobility to safest level of function  Outcome: Progressing  Goal: Maintain proper alignment of affected body part  Outcome: Progressing

## 2025-03-14 NOTE — PLAN OF CARE
Problem: Physical Therapy - Adult  Goal: By Discharge: Performs mobility at highest level of function for planned discharge setting.  See evaluation for individualized goals.  Description: FUNCTIONAL STATUS PRIOR TO ADMISSION: Pt poor hx.  Stated she uses device but unclear.  Was using SPC when admitted 8/2024.    HOME SUPPORT PRIOR TO ADMISSION: The patient lived alone with DTR to provide assistance.    Physical Therapy Goals  Initiated 3/12/2025  1.  Patient will move from supine to sit and sit to supine in bed with supervision/set-up within 7 day(s).    2.  Patient will perform sit to stand with supervision/set-up within 7 day(s).  3.  Patient will transfer from bed to chair and chair to bed with supervision/set-up using the least restrictive device within 7 day(s).  4.  Patient will ambulate with supervision/set-up for 150 feet with the least restrictive device within 7 day(s).   5.  Patient will ascend/descend 12 stairs with 1 handrail(s) with contact guard assist within 7 day(s).    Outcome: Progressing     PHYSICAL THERAPY TREATMENT    Patient: Zeenat Rolle (79 y.o. female)  Date: 3/14/2025  Diagnosis: Bradycardia [R00.1]  Other fatigue [R53.83]  Symptomatic bradycardia [R00.1] Bradycardia  Procedure(s) (LRB):  Insert PPM dual (N/A) 1 Day Post-Op  Precautions: Restrictions/Precautions  Restrictions/Precautions: Fall Risk            ASSESSMENT:  Patient continues to benefit from skilled PT services and is slowly progressing towards goals. Pt encountered semi-reclined in bed on RA in NAD, cleared by RN and agreeable to participate in therapy. Transferred semifowler > sit (HOB elevated) with S. Anterior scooting at EOB with Mod A. Transferred to stand with RW and Min-mod A, noted B knee buckling in standing but improved with verbal/tactile cues for knee extension and UE support on RW. Side steps taken at EOB then transferred to sit on bedside chair with Min/Mod A for steadying and RW management. Performed

## 2025-03-14 NOTE — PROGRESS NOTES
2235: notified Kendall MARES of [facility] and that patients rectal temp was 94, added blankets and heated room up.     2237: response from MD was to try those first prior to sheba beckman.     2313: notified MD that patients temp rectally was 94.3 after half hour of blankets and heat increased.     2314: Sheba Hugger ordered.     2345: Sheba hugger applied to patient    0127: oral temp 97.4    0352: temp 98.7 sheba hugger turned off.

## 2025-03-14 NOTE — PROGRESS NOTES
AT 7:41 - received critical Potassium  lab result 8.5 .  Notified to attending provider and reconsulted  to nephrology .  Vitals check , EKG 12 done   Given calcium gluconate , insulin regular, dextrose 10% , lasix , lokelma and bolus sodium chloride 0.9% bolus IV. As per provider ordered   Bladder scan and straight cath is done .  STAT BMP was done after 30 min of treatment .  BS done , BS its critical , Start dextrose 10% bolus .  Report given to Meredith AMBROCIO , pt transfer in IVCU room 9160 .

## 2025-03-14 NOTE — PROGRESS NOTES
RENAL  PROGRESS NOTE        Subjective:   weak        Objective:   VITALS SIGNS:    BP (!) 120/44   Pulse 66   Temp 98.3 °F (36.8 °C) (Oral)   Resp 18   Ht 1.6 m (5' 3\")   Wt 64.4 kg (142 lb)   SpO2 95%   BMI 25.15 kg/m²             Temp (24hrs), Av.9 °F (36.1 °C), Min:94 °F (34.4 °C), Max:99.4 °F (37.4 °C)         PHYSICAL EXAM:  NAD    DATA REVIEW:     INTAKE / OUTPUT:   Last shift:      No intake/output data recorded.  Last 3 shifts:  1901 -  0700  In: 500 [P.O.:500]  Out: 1600 [Urine:1600]    Intake/Output Summary (Last 24 hours) at 3/14/2025 0804  Last data filed at 3/13/2025 1705  Gross per 24 hour   Intake 500 ml   Output 1250 ml   Net -750 ml         LABS:   LABS:  Recent Labs     25  0235 25  1613 25  0922    142 142   K 5.1 4.5 4.4   * 114* 117*   CO2 23 23 22   BUN 25* 24* 23*   CREATININE 1.36* 1.42* 1.39*   CALCIUM 9.1 8.9 9.0     Recent Labs     25  0353 25  0235 25  0457   WBC 7.6 6.2 6.8   HGB 7.0* 6.8* 8.1*   HCT 21.8* 21.5* 25.9*    170 211     No results for input(s): \"KU\", \"CLU\" in the last 720 hours.    Invalid input(s): \"[place]\", \"CREAU\", \"PROU\"       ssessment:             acute on chronic hyperkalemia  Severe critical hyperkalemia in the morning which is better now  CA on CKD  Anemia  CKD stage III  Urinary retention? Discussion:         baseline creatinine appears to be 1.2-1.4          creat better   agree with placement of Neri catheter.  There might be an underlying urine retention    Lokelma given.  Potassium better

## 2025-03-15 LAB
ANION GAP SERPL CALC-SCNC: 6 MMOL/L (ref 2–12)
BUN SERPL-MCNC: 26 MG/DL (ref 6–20)
BUN/CREAT SERPL: 20 (ref 12–20)
CALCIUM SERPL-MCNC: 9.2 MG/DL (ref 8.5–10.1)
CHLORIDE SERPL-SCNC: 113 MMOL/L (ref 97–108)
CO2 SERPL-SCNC: 21 MMOL/L (ref 21–32)
CREAT SERPL-MCNC: 1.29 MG/DL (ref 0.55–1.02)
ERYTHROCYTE [DISTWIDTH] IN BLOOD BY AUTOMATED COUNT: 18.3 % (ref 11.5–14.5)
GLUCOSE BLD STRIP.AUTO-MCNC: 126 MG/DL (ref 65–117)
GLUCOSE BLD STRIP.AUTO-MCNC: 131 MG/DL (ref 65–117)
GLUCOSE BLD STRIP.AUTO-MCNC: 75 MG/DL (ref 65–117)
GLUCOSE SERPL-MCNC: 71 MG/DL (ref 65–100)
HCT VFR BLD AUTO: 22.4 % (ref 35–47)
HGB BLD-MCNC: 7.1 G/DL (ref 11.5–16)
MCH RBC QN AUTO: 29.7 PG (ref 26–34)
MCHC RBC AUTO-ENTMCNC: 31.7 G/DL (ref 30–36.5)
MCV RBC AUTO: 93.7 FL (ref 80–99)
NRBC # BLD: 0.08 K/UL (ref 0–0.01)
NRBC BLD-RTO: 1.2 PER 100 WBC
PLATELET # BLD AUTO: 182 K/UL (ref 150–400)
PMV BLD AUTO: 10.8 FL (ref 8.9–12.9)
POTASSIUM SERPL-SCNC: 4.8 MMOL/L (ref 3.5–5.1)
RBC # BLD AUTO: 2.39 M/UL (ref 3.8–5.2)
SERVICE CMNT-IMP: ABNORMAL
SERVICE CMNT-IMP: ABNORMAL
SERVICE CMNT-IMP: NORMAL
SODIUM SERPL-SCNC: 140 MMOL/L (ref 136–145)
WBC # BLD AUTO: 6.9 K/UL (ref 3.6–11)

## 2025-03-15 PROCEDURE — 85027 COMPLETE CBC AUTOMATED: CPT

## 2025-03-15 PROCEDURE — 2060000000 HC ICU INTERMEDIATE R&B

## 2025-03-15 PROCEDURE — P9047 ALBUMIN (HUMAN), 25%, 50ML: HCPCS | Performed by: STUDENT IN AN ORGANIZED HEALTH CARE EDUCATION/TRAINING PROGRAM

## 2025-03-15 PROCEDURE — 36415 COLL VENOUS BLD VENIPUNCTURE: CPT

## 2025-03-15 PROCEDURE — 6370000000 HC RX 637 (ALT 250 FOR IP): Performed by: STUDENT IN AN ORGANIZED HEALTH CARE EDUCATION/TRAINING PROGRAM

## 2025-03-15 PROCEDURE — 6360000002 HC RX W HCPCS: Performed by: STUDENT IN AN ORGANIZED HEALTH CARE EDUCATION/TRAINING PROGRAM

## 2025-03-15 PROCEDURE — 2500000003 HC RX 250 WO HCPCS: Performed by: STUDENT IN AN ORGANIZED HEALTH CARE EDUCATION/TRAINING PROGRAM

## 2025-03-15 PROCEDURE — 82962 GLUCOSE BLOOD TEST: CPT

## 2025-03-15 PROCEDURE — 2580000003 HC RX 258: Performed by: STUDENT IN AN ORGANIZED HEALTH CARE EDUCATION/TRAINING PROGRAM

## 2025-03-15 PROCEDURE — 80048 BASIC METABOLIC PNL TOTAL CA: CPT

## 2025-03-15 RX ORDER — SODIUM CHLORIDE, SODIUM LACTATE, POTASSIUM CHLORIDE, AND CALCIUM CHLORIDE .6; .31; .03; .02 G/100ML; G/100ML; G/100ML; G/100ML
1000 INJECTION, SOLUTION INTRAVENOUS ONCE
Status: COMPLETED | OUTPATIENT
Start: 2025-03-15 | End: 2025-03-16

## 2025-03-15 RX ORDER — DIPHENHYDRAMINE HCL 25 MG
25 CAPSULE ORAL EVERY 6 HOURS PRN
Status: DISCONTINUED | OUTPATIENT
Start: 2025-03-15 | End: 2025-03-22 | Stop reason: HOSPADM

## 2025-03-15 RX ORDER — ALBUMIN (HUMAN) 12.5 G/50ML
25 SOLUTION INTRAVENOUS ONCE
Status: COMPLETED | OUTPATIENT
Start: 2025-03-15 | End: 2025-03-15

## 2025-03-15 RX ADMIN — ALBUMIN (HUMAN) 25 G: 0.25 INJECTION, SOLUTION INTRAVENOUS at 22:47

## 2025-03-15 RX ADMIN — SODIUM CHLORIDE, SODIUM LACTATE, POTASSIUM CHLORIDE, AND CALCIUM CHLORIDE 1000 ML: .6; .31; .03; .02 INJECTION, SOLUTION INTRAVENOUS at 22:36

## 2025-03-15 RX ADMIN — ASPIRIN 81 MG: 81 TABLET, COATED ORAL at 09:26

## 2025-03-15 RX ADMIN — SODIUM CHLORIDE, PRESERVATIVE FREE 10 ML: 5 INJECTION INTRAVENOUS at 09:26

## 2025-03-15 RX ADMIN — SODIUM CHLORIDE, PRESERVATIVE FREE 10 ML: 5 INJECTION INTRAVENOUS at 21:00

## 2025-03-15 RX ADMIN — ATORVASTATIN CALCIUM 40 MG: 40 TABLET, FILM COATED ORAL at 09:26

## 2025-03-15 RX ADMIN — ENOXAPARIN SODIUM 30 MG: 100 INJECTION SUBCUTANEOUS at 09:26

## 2025-03-15 NOTE — PROGRESS NOTES
End of Shift Note    Bedside shift change report given to Rhea AMBROCIO (oncoming nurse) by Obdulia Rolle RN (offgoing nurse).  Report included the following information SBAR, Kardex, MAR, Recent Results, and Cardiac Rhythm NSR/Sinus Diogenes    Shift worked:  [id number]     Shift summary and any significant changes:     Uneventful shift.      Concerns for physician to address:  None     Zone phone for oncoming shift:          Activity:  Level of Assistance: Moderate assist, patient does 50-74%  Number times ambulated in hallways past shift: 0  Number of times OOB to chair past shift: 2    Cardiac:   Cardiac Monitoring: Yes      Cardiac Rhythm: Sinus rhythm    Access:  Current line(s): PIV     Genitourinary:   Urinary Status: Voiding    Respiratory:   O2 Device: None (Room air)  Chronic home O2 use?: NO  Incentive spirometer at bedside: NO    GI:  Last BM (including prior to admit): 03/14/25  Current diet:  ADULT DIET; Regular; Low Potassium (Less than 3000 mg/day)  Passing flatus: YES    Pain Management:   Patient states pain is manageable on current regimen: YES    Skin:  Eron Scale Score: 17  Interventions: Wound Offloading (Prevention Methods): Turning, Pillows, Repositioning, Bed, pressure redistribution/air, Bed, pressure reduction mattress    Patient Safety:  Fall Risk: Nursing Judgement-Fall Risk High(Add Comments): Yes  Fall Risk Interventions  Nursing Judgement-Fall Risk High(Add Comments): Yes  Toilet Every 2 Hours-In Advance of Need: Yes  Hourly Visual Checks: Awake  Fall Visual Posted: Armband, Socks, Fall sign posted  Room Door Open: Yes  Alarm On: Bed  Patient Moved Closer to Nursing Station: No    Active Consults:   IP CONSULT TO CARDIOLOGY  IP CONSULT TO NEPHROLOGY  IP CONSULT TO UROLOGY    Length of Stay:  Expected LOS: 8  Actual LOS: 1    Obdulia Rolle RN

## 2025-03-15 NOTE — PLAN OF CARE
End of Shift Note    Bedside shift change report given to Obdulia AMBROCIO (oncoming nurse) by Ghazala Barcenas RN (offgoing nurse).  Report included the following information SBAR and Cardiac Rhythm Sinus brent    Shift worked:  7p to 7am     Shift summary and any significant changes:     No acute changes, Patient up to bedside commode multiple times     Concerns for physician to address:  None     Zone phone for oncoming shift:   [phone]       Activity:  Level of Assistance: Moderate assist, patient does 50-74%  Number times ambulated in hallways past shift: 0  Number of times OOB to chair past shift: 1    Cardiac:   Cardiac Monitoring: Yes      Cardiac Rhythm: Sinus rhythm    Access:  Current line(s): PIV     Genitourinary:   Urinary Status: Voiding    Respiratory:   O2 Device: None (Room air)  Chronic home O2 use?: NO  Incentive spirometer at bedside: NO    GI:  Last BM (including prior to admit): 03/14/25  Current diet:  ADULT DIET; Regular; Low Potassium (Less than 3000 mg/day)  Passing flatus: YES    Pain Management:   Patient states pain is manageable on current regimen: YES    Skin:  Eron Scale Score: 18  Interventions: Wound Offloading (Prevention Methods): Pillows, Repositioning, Turning, Elevate heels    Patient Safety:  Fall Risk: Nursing Judgement-Fall Risk High(Add Comments): Yes  Fall Risk Interventions  Nursing Judgement-Fall Risk High(Add Comments): Yes  Toilet Every 2 Hours-In Advance of Need: Yes  Hourly Visual Checks: In bed  Fall Visual Posted: Armband, Socks  Room Door Open: Yes  Alarm On: Bed  Patient Moved Closer to Nursing Station: No    Active Consults:   IP CONSULT TO CARDIOLOGY  IP CONSULT TO NEPHROLOGY  IP CONSULT TO UROLOGY    Length of Stay:  Expected LOS: 8  Actual LOS: 0    Ghazala Barcenas RN            Predictive Model Details          38 (Caution)  Factor Value    Calculated 3/14/2025 22:31 33% Age 79 years old    Deterioration Index Model 24% Neurological exam X     12% Potassium 5.1 mmol/L

## 2025-03-15 NOTE — PROGRESS NOTES
[facility]     Progress Note      3/15/2025 7:20 AM  NAME: Zeenat Rolle   MRN:  [id number]   Admit Diagnosis: Bradycardia [R00.1]  Other fatigue [R53.83]  Symptomatic bradycardia [R00.1]     Problem List:     --Sinus Bradycardia  History of Syncope  Anemia (7.5)  Hyperkalemia (has had this)  Dyslipidemia  CKD 3 ([place])  Hypoalbuminemia  Achalasia  GERD  PAD, Subclavian stenoisis  Former tobacco  Full Code  ,Lives alone, daughter visits daily  -t/-e/-s      Assessment/Plan:   Update 3/15/25  Doing well.  No chest pain or shortness of breath.  No significant bradycardia occasional heart rate lower at nighttime  Blood pressure 117/55, heart rate 60  Creatinine 1.29, hemoglobin 7.1, potassium 4.8  Alert and oriented x 3, no acute distress, lungs clear, regular rate rhythm, no pedal edema    Recommendations:  On aspirin and Lipitor  EP following for Ongoing discussion regarding pacemaker  Continue to monitor on telemetry          --EKG with SB rate 58 bpm,  ms, QRS 82 ms, Qtc 412 ms. Tele with SSS and pauses noted overnight to around 32. Avoid AV Jack blockers. She was offered PPM last fall and turned down. TTE with preserved LVEF and no sig valvular disease.   Her hemoglobin of 7.7 is contributing to her extreme fatigue. This will need to be worked up. She denies bleeding. She required unit of PRBCs in 8/24 for same.  Had a long conversation with daughter and son. I think permanent pacemaker is reasonable as she is bradycardic and it is very likely that her fatigue will improve follwing a pacemaker. I will plan to tentatively do this on Friday. She will likely needs a single chamber pacemaker only. Risks benefits and alternatives discussed at length with daughter and son. Daughter is her power of .   3/13 update: I spoke with patient, nursing and daughter. Will plan on PPM today. Patient is unable to sign consent due to hand weakness, daughter will be in at 11. Nurse  30 mg SubCUTAneous Daily    ondansetron (ZOFRAN-ODT) disintegrating tablet 4 mg  4 mg Oral Q8H PRN    Or    ondansetron (ZOFRAN) injection 4 mg  4 mg IntraVENous Q6H PRN    polyethylene glycol (GLYCOLAX) packet 17 g  17 g Oral Daily PRN    acetaminophen (TYLENOL) tablet 650 mg  650 mg Oral Q6H PRN    Or    acetaminophen (TYLENOL) suppository 650 mg  650 mg Rectal Q6H PRN    melatonin tablet 3 mg  3 mg Oral Nightly PRN         Antonieta Palacio MD

## 2025-03-15 NOTE — PLAN OF CARE
Problem: Discharge Planning  Goal: Discharge to home or other facility with appropriate resources  Outcome: Progressing  Flowsheets (Taken 3/15/2025 1455)  Discharge to home or other facility with appropriate resources:   Identify barriers to discharge with patient and caregiver   Identify discharge learning needs (meds, wound care, etc)     Problem: Safety - Adult  Goal: Free from fall injury  Outcome: Progressing  Flowsheets (Taken 3/15/2025 1455)  Free From Fall Injury:   Instruct family/caregiver on patient safety   Based on caregiver fall risk screen, instruct family/caregiver to ask for assistance with transferring infant if caregiver noted to have fall risk factors     Problem: Respiratory - Adult  Goal: Achieves optimal ventilation and oxygenation  Outcome: Progressing  Flowsheets (Taken 3/15/2025 1455)  Achieves optimal ventilation and oxygenation: Assess for changes in respiratory status     Problem: Cardiovascular - Adult  Goal: Maintains optimal cardiac output and hemodynamic stability  Outcome: Progressing  Flowsheets (Taken 3/15/2025 1455)  Maintains optimal cardiac output and hemodynamic stability: Monitor blood pressure and heart rate  Goal: Absence of cardiac dysrhythmias or at baseline  Outcome: Progressing  Flowsheets (Taken 3/15/2025 1455)  Absence of cardiac dysrhythmias or at baseline: Monitor cardiac rate and rhythm     Problem: Musculoskeletal - Adult  Goal: Return mobility to safest level of function  Outcome: Progressing  Flowsheets (Taken 3/15/2025 1455)  Return Mobility to Safest Level of Function:   Assess patient stability and activity tolerance for standing, transferring and ambulating with or without assistive devices   Assist with transfers and ambulation using safe patient handling equipment as needed   Ensure adequate protection for wounds/incisions during mobilization   Obtain physical therapy/occupational therapy consults as needed  Goal: Return ADL status to a safe level of  function  Outcome: Progressing  Flowsheets (Taken 3/15/2025 1455)  Return ADL Status to a Safe Level of Function:   Assess activities of daily living deficits and provide assistive devices as needed   Obtain physical therapy/occupational therapy consults as needed     Problem: Hematologic - Adult  Goal: Maintains hematologic stability  Outcome: Progressing  Flowsheets (Taken 3/15/2025 1455)  Maintains hematologic stability: Assess for signs and symptoms of bleeding or hemorrhage     Problem: Skin/Tissue Integrity  Goal: Skin integrity remains intact  Description: 1.  Monitor for areas of redness and/or skin breakdown  2.  Assess vascular access sites hourly  3.  Every 4-6 hours minimum:  Change oxygen saturation probe site  4.  Every 4-6 hours:  If on nasal continuous positive airway pressure, respiratory therapy assess nares and determine need for appliance change or resting period  Outcome: Progressing  Flowsheets (Taken 3/15/2025 1455)  Skin Integrity Remains Intact: Monitor for areas of redness and/or skin breakdown

## 2025-03-15 NOTE — PROGRESS NOTES
0715 Bedside shift change report given to Obdulia RN (oncoming nurse) by Delia RN (offgoing nurse). Report included the following information Nurse Handoff Report, MAR, Recent Results, and Cardiac Rhythm NSR/Sinus Diogenes .

## 2025-03-15 NOTE — PROGRESS NOTES
Hospitalist Progress Note    NAME:   Zeenat Rolle   : 1945   MRN: [id number]     Date/Time: 3/15/2025 2:36 PM  Patient PCP: Lefty Segura MD    Estimated discharge date: 3/17  Barriers: PPM      Assessment / Plan:    Active Problems:  Failure to thrive  CA on CKD 3  Normocytic anemia, chronic-slightly worse from baseline  Concern for symptomatic bradycardia  Essential hypertension  Hyperlipidemia     Plan:  Symptomatic bradycardia  HTN  HLD  Cardiology on board  Planning for PPM  Continue with statin, aspirin  3/14  PPM implanted for earlier this week  Hemoglobin stability  Transfused if hemoglobin less than 7    CA CKD  Hyperkalemia  1 dose of Lokelma given  Repeat potassium 5.6  Will consult nephrology  3/13  Patient potassium 8.5, bradycardia, and peak T wave  Stat hyperkalemia order, D10, insulin, calcium gluconate ordered  Lokelma and Lasix added  Discussed with nephrology too  Repeat K is 4.4  Recheck again in 4 hours  3/14  Low potassium diet  Potassium today 5.1  Neri catheter placed for retention  Urology consult  3/15  Urology input appreciated  Potassium remained WNL 4.8 today  Continue with low diet potassium    Anemia  Follow-up with iron panel and B12  IV iron    Medical Decision Making:   I personally reviewed labs:yes, CBC, BMP  I personally reviewed imaging:yes  I personally reviewed EKG:yes  Toxic drug monitoring: Yes, monitor platelet count  Discussed case with: Patient, CM, RN        Code Status: full code  DVT Prophylaxis: Lovenox  GI Prophylaxis: None indicated    Subjective:     Chief Complaint / Reason for Physician Visit  \"Patient seen at the bedside  \".  Discussed with RN events overnight.   Discussed with the patient daughter at the bedside    Objective:     VITALS:   Last 24hrs VS reviewed since prior progress note. Most recent are:  Patient Vitals for the past 24 hrs:   BP Temp Temp src Pulse Resp SpO2 Weight   03/15/25 1141 (!) 96/47 97.6 °F (36.4 °C) Oral 68 16 97

## 2025-03-16 LAB
ANION GAP SERPL CALC-SCNC: 4 MMOL/L (ref 2–12)
BUN SERPL-MCNC: 24 MG/DL (ref 6–20)
BUN/CREAT SERPL: 21 (ref 12–20)
CALCIUM SERPL-MCNC: 8.5 MG/DL (ref 8.5–10.1)
CHLORIDE SERPL-SCNC: 114 MMOL/L (ref 97–108)
CO2 SERPL-SCNC: 24 MMOL/L (ref 21–32)
CREAT SERPL-MCNC: 1.12 MG/DL (ref 0.55–1.02)
ERYTHROCYTE [DISTWIDTH] IN BLOOD BY AUTOMATED COUNT: 18.3 % (ref 11.5–14.5)
GLUCOSE BLD STRIP.AUTO-MCNC: 110 MG/DL (ref 65–117)
GLUCOSE BLD STRIP.AUTO-MCNC: 114 MG/DL (ref 65–117)
GLUCOSE BLD STRIP.AUTO-MCNC: 140 MG/DL (ref 65–117)
GLUCOSE BLD STRIP.AUTO-MCNC: 206 MG/DL (ref 65–117)
GLUCOSE BLD STRIP.AUTO-MCNC: 91 MG/DL (ref 65–117)
GLUCOSE BLD STRIP.AUTO-MCNC: 94 MG/DL (ref 65–117)
GLUCOSE SERPL-MCNC: 48 MG/DL (ref 65–100)
HCT VFR BLD AUTO: 19.1 % (ref 35–47)
HCT VFR BLD AUTO: 26.6 % (ref 35–47)
HGB BLD-MCNC: 6 G/DL (ref 11.5–16)
HGB BLD-MCNC: 8.8 G/DL (ref 11.5–16)
HISTORY CHECK: NORMAL
MCH RBC QN AUTO: 29.4 PG (ref 26–34)
MCHC RBC AUTO-ENTMCNC: 31.4 G/DL (ref 30–36.5)
MCV RBC AUTO: 93.6 FL (ref 80–99)
NRBC # BLD: 0.04 K/UL (ref 0–0.01)
NRBC BLD-RTO: 0.5 PER 100 WBC
PLATELET # BLD AUTO: 207 K/UL (ref 150–400)
PMV BLD AUTO: 10.9 FL (ref 8.9–12.9)
POTASSIUM SERPL-SCNC: 4.4 MMOL/L (ref 3.5–5.1)
RBC # BLD AUTO: 2.04 M/UL (ref 3.8–5.2)
SERVICE CMNT-IMP: ABNORMAL
SERVICE CMNT-IMP: ABNORMAL
SERVICE CMNT-IMP: NORMAL
SODIUM SERPL-SCNC: 142 MMOL/L (ref 136–145)
WBC # BLD AUTO: 7.8 K/UL (ref 3.6–11)

## 2025-03-16 PROCEDURE — 36430 TRANSFUSION BLD/BLD COMPNT: CPT

## 2025-03-16 PROCEDURE — 2500000003 HC RX 250 WO HCPCS: Performed by: STUDENT IN AN ORGANIZED HEALTH CARE EDUCATION/TRAINING PROGRAM

## 2025-03-16 PROCEDURE — 86901 BLOOD TYPING SEROLOGIC RH(D): CPT

## 2025-03-16 PROCEDURE — 85027 COMPLETE CBC AUTOMATED: CPT

## 2025-03-16 PROCEDURE — 2060000000 HC ICU INTERMEDIATE R&B

## 2025-03-16 PROCEDURE — P9016 RBC LEUKOCYTES REDUCED: HCPCS

## 2025-03-16 PROCEDURE — 6370000000 HC RX 637 (ALT 250 FOR IP): Performed by: STUDENT IN AN ORGANIZED HEALTH CARE EDUCATION/TRAINING PROGRAM

## 2025-03-16 PROCEDURE — 36415 COLL VENOUS BLD VENIPUNCTURE: CPT

## 2025-03-16 PROCEDURE — 86850 RBC ANTIBODY SCREEN: CPT

## 2025-03-16 PROCEDURE — 86923 COMPATIBILITY TEST ELECTRIC: CPT

## 2025-03-16 PROCEDURE — 85014 HEMATOCRIT: CPT

## 2025-03-16 PROCEDURE — 86900 BLOOD TYPING SEROLOGIC ABO: CPT

## 2025-03-16 PROCEDURE — 85018 HEMOGLOBIN: CPT

## 2025-03-16 PROCEDURE — 80048 BASIC METABOLIC PNL TOTAL CA: CPT

## 2025-03-16 PROCEDURE — 2580000003 HC RX 258: Performed by: STUDENT IN AN ORGANIZED HEALTH CARE EDUCATION/TRAINING PROGRAM

## 2025-03-16 PROCEDURE — 82962 GLUCOSE BLOOD TEST: CPT

## 2025-03-16 PROCEDURE — 6360000002 HC RX W HCPCS: Performed by: STUDENT IN AN ORGANIZED HEALTH CARE EDUCATION/TRAINING PROGRAM

## 2025-03-16 RX ORDER — SODIUM CHLORIDE 9 MG/ML
INJECTION, SOLUTION INTRAVENOUS PRN
Status: DISCONTINUED | OUTPATIENT
Start: 2025-03-16 | End: 2025-03-17 | Stop reason: ALTCHOICE

## 2025-03-16 RX ORDER — SODIUM CHLORIDE, SODIUM LACTATE, POTASSIUM CHLORIDE, CALCIUM CHLORIDE 600; 310; 30; 20 MG/100ML; MG/100ML; MG/100ML; MG/100ML
INJECTION, SOLUTION INTRAVENOUS CONTINUOUS
Status: DISCONTINUED | OUTPATIENT
Start: 2025-03-16 | End: 2025-03-17

## 2025-03-16 RX ADMIN — SODIUM CHLORIDE, PRESERVATIVE FREE 10 ML: 5 INJECTION INTRAVENOUS at 09:50

## 2025-03-16 RX ADMIN — ENOXAPARIN SODIUM 30 MG: 100 INJECTION SUBCUTANEOUS at 09:49

## 2025-03-16 RX ADMIN — ASPIRIN 81 MG: 81 TABLET, COATED ORAL at 09:49

## 2025-03-16 RX ADMIN — SODIUM CHLORIDE, POTASSIUM CHLORIDE, SODIUM LACTATE AND CALCIUM CHLORIDE: 600; 310; 30; 20 INJECTION, SOLUTION INTRAVENOUS at 03:22

## 2025-03-16 RX ADMIN — SODIUM CHLORIDE, PRESERVATIVE FREE 10 ML: 5 INJECTION INTRAVENOUS at 22:57

## 2025-03-16 RX ADMIN — ATORVASTATIN CALCIUM 40 MG: 40 TABLET, FILM COATED ORAL at 09:49

## 2025-03-16 RX ADMIN — DEXTROSE MONOHYDRATE: 100 INJECTION, SOLUTION INTRAVENOUS at 04:34

## 2025-03-16 NOTE — PROGRESS NOTES
Hospitalist Progress Note    NAME:   Zeenat Rolle   : 1945   MRN: [id number]     Date/Time: 3/16/2025 10:46 AM  Patient PCP: Lefty Segura MD    Estimated discharge date: 3/17  Barriers: PPM      Assessment / Plan:    Active Problems:  Failure to thrive  CA on CKD 3  Normocytic anemia, chronic-slightly worse from baseline  Concern for symptomatic bradycardia  Essential hypertension  Hyperlipidemia     Plan:  Symptomatic bradycardia  HTN  HLD  Cardiology on board  Planning for PPM  Continue with statin, aspirin  3/14  PPM implanted for earlier this week  Hemoglobin stability  Transfused if hemoglobin less than 7  3/16  S/p one unit of PRBCs  Check posttransfusion hemoglobin  Check again in a.m.  Aspirin, Lovenox held today    CA CKD  Hyperkalemia  1 dose of Lokelma given  Repeat potassium 5.6  Will consult nephrology  3/13  Patient potassium 8.5, bradycardia, and peak T wave  Stat hyperkalemia order, D10, insulin, calcium gluconate ordered  Lokelma and Lasix added  Discussed with nephrology too  Repeat K is 4.4  Recheck again in 4 hours  3/14  Low potassium diet  Potassium today 5.1  Neri catheter placed for retention  Urology consult  3/15  Urology input appreciated  Potassium remained WNL 4.8 today  Continue with low diet potassium    Anemia  Follow-up with iron panel and B12  IV iron    Medical Decision Making:   I personally reviewed labs:yes, CBC, BMP  I personally reviewed imaging:yes  I personally reviewed EKG:yes  Toxic drug monitoring: Yes, monitor platelet count  Discussed case with: Patient, CM, RN        Code Status: full code  DVT Prophylaxis: Lovenox  GI Prophylaxis: None indicated    Subjective:     Chief Complaint / Reason for Physician Visit  \"Patient seen at the bedside  \".  Discussed with RN events overnight.   No new symptoms    Objective:     VITALS:   Last 24hrs VS reviewed since prior progress note. Most recent are:  Patient Vitals for the past 24 hrs:   BP Temp Temp src  use  CV:  Regular  rhythm,  No edema  GI:  Soft, Non distended, Non tender.  +Bowel sounds  Neurologic:  Alert and oriented X 3, normal speech,   Psych:   Good insight. Not anxious nor agitated  Skin:  No rashes.  No jaundice    Reviewed most current lab test results and cultures  YES  Reviewed most current radiology test results   YES  Review and summation of old records today    NO  Reviewed patient's current orders and MAR    YES  PMH/SH reviewed - no change compared to H&P    Procedures: see electronic medical records for all procedures/Xrays and details which were not copied into this note but were reviewed prior to creation of Plan.      LABS:  I reviewed today's most current labs and imaging studies.  Pertinent labs include:  Recent Labs     03/14/25  0353 03/15/25  0239 03/16/25  0303   WBC 7.6 6.9 7.8   HGB 7.0* 7.1* 6.0*   HCT 21.8* 22.4* 19.1*    182 207     Recent Labs     03/14/25  0235 03/15/25  0239 03/16/25  0303    140 142   K 5.1 4.8 4.4   * 113* 114*   CO2 23 21 24   GLUCOSE 118* 71 48*   BUN 25* 26* 24*   CREATININE 1.36* 1.29* 1.12*   CALCIUM 9.1 9.2 8.5       Signed: Jesenia Tong MD

## 2025-03-16 NOTE — CONSENT
Informed Consent for Blood Component Transfusion Note    I have discussed with the patient the rationale for blood component transfusion; its benefits in treating or preventing fatigue, organ damage, or death; and its risk which includes mild transfusion reactions, rare risk of blood borne infection, or more serious but rare reactions. I have discussed the alternatives to transfusion, including the risk and consequences of not receiving transfusion. The patient had an opportunity to ask questions and had agreed to proceed with transfusion of blood components.    Electronically signed by Price Miranda DO on 3/16/25 at 4:12 AM EDT

## 2025-03-16 NOTE — PROGRESS NOTES
[facility]     Progress Note      3/16/2025 10:13 AM  NAME: Zeenat Rolle   MRN:  [id number]   Admit Diagnosis: Bradycardia [R00.1]  Other fatigue [R53.83]  Symptomatic bradycardia [R00.1]     Problem List:     --Sinus Bradycardia  History of Syncope  Anemia (7.5)  Hyperkalemia (has had this)  Dyslipidemia  CKD 3 (Abou[place]Assi)  Hypoalbuminemia  Achalasia  GERD  PAD, Subclavian stenoisis  Former tobacco  Full Code  ,Lives alone, daughter visits daily  -t/-e/-s      Assessment/Plan:   Update 3/16/25  Getting blood transfusions for anemia.  No chest pain or shortness of breath.  Continue to have intermittent bradycardia  Blood pressure 98/59, heart rate 53  Creatinine 1.29> 1.12, hemoglobin 7.1> 6, potassium 4.8> 4.4  Alert and oriented x 3, no acute distress, lungs clear, regular rate rhythm, no pedal edema, systolic murmur    Recommendations:  On Lipitor  We will hold aspirin due to significant anemia  EP following for Ongoing discussion regarding pacemaker  Continue to monitor on telemetry          --EKG with SB rate 58 bpm,  ms, QRS 82 ms, Qtc 412 ms. Tele with SSS and pauses noted overnight to around 32. Avoid AV Jack blockers. She was offered PPM last fall and turned down. TTE with preserved LVEF and no sig valvular disease.   Her hemoglobin of 7.7 is contributing to her extreme fatigue. This will need to be worked up. She denies bleeding. She required unit of PRBCs in 8/24 for same.  Had a long conversation with daughter and son. I think permanent pacemaker is reasonable as she is bradycardic and it is very likely that her fatigue will improve follwing a pacemaker. I will plan to tentatively do this on Friday. She will likely needs a single chamber pacemaker only. Risks benefits and alternatives discussed at length with daughter and son. Daughter is her power of .   3/13 update: I spoke with patient, nursing and daughter. Will plan on PPM today. Patient is unable to

## 2025-03-16 NOTE — PLAN OF CARE
Problem: Discharge Planning  Goal: Discharge to home or other facility with appropriate resources  3/15/2025 2122 by Dhruv Bingham RN  Outcome: Progressing  3/15/2025 1455 by Obdulia Rolle RN  Outcome: Progressing  Flowsheets (Taken 3/15/2025 1455)  Discharge to home or other facility with appropriate resources:   Identify barriers to discharge with patient and caregiver   Identify discharge learning needs (meds, wound care, etc)     Problem: Safety - Adult  Goal: Free from fall injury  3/15/2025 2122 by Dhruv Bingham RN  Outcome: Progressing  3/15/2025 1455 by Obdulia Rolle RN  Outcome: Progressing  Flowsheets (Taken 3/15/2025 1455)  Free From Fall Injury:   Instruct family/caregiver on patient safety   Based on caregiver fall risk screen, instruct family/caregiver to ask for assistance with transferring infant if caregiver noted to have fall risk factors     Problem: Respiratory - Adult  Goal: Achieves optimal ventilation and oxygenation  3/15/2025 2122 by Dhruv Bingham RN  Outcome: Progressing  3/15/2025 1455 by Obdulia Rolle RN  Outcome: Progressing  Flowsheets (Taken 3/15/2025 1455)  Achieves optimal ventilation and oxygenation: Assess for changes in respiratory status     Problem: Cardiovascular - Adult  Goal: Maintains optimal cardiac output and hemodynamic stability  3/15/2025 2122 by Dhruv Bingham RN  Outcome: Progressing  3/15/2025 1455 by Obdulia Rolle RN  Outcome: Progressing  Flowsheets (Taken 3/15/2025 1455)  Maintains optimal cardiac output and hemodynamic stability: Monitor blood pressure and heart rate  Goal: Absence of cardiac dysrhythmias or at baseline  3/15/2025 2122 by Dhruv Bingham RN  Outcome: Progressing  3/15/2025 1455 by Obdulia Rolle RN  Outcome: Progressing  Flowsheets (Taken 3/15/2025 1455)  Absence of cardiac dysrhythmias or at baseline: Monitor cardiac rate and rhythm     Problem: Musculoskeletal - Adult  Goal: Return mobility to safest level  1455)  Skin Integrity Remains Intact: Monitor for areas of redness and/or skin breakdown      Female

## 2025-03-16 NOTE — PROGRESS NOTES
2044: Perfect Serve message sent to MD Argueta regarding consistent Hypotension worsening (Blood Pressure: 83/48 with a MAP of 61). No response. 1 hour later \"PRN Benadryl Q6 ordered\". RN (myself) did not notify MD of any need for Benadryl or symptoms that warranted Benadryl. Rapid Response RN rounded on the unit and patient as well.     2141: RN (myself) sent Perfect Serve message to MD Miranda regarding patient's Hypotension and he quickly responded and ordered Albumin (1x dose of 25 mg IV) and a Lactated Ringer's Bolus (1L).     0259: Perfect Serve message sent to MD Miranda notifying him of continued Hypotension after both Albumin and LR Bolus. Orders placed.     0352: Perfect Serve message sent to MD Miranda with Lab Results. Hemoglobin resulted at 6.0. MD Miranda placed Blood Transfusion orders and came to bedside for both myself, him, and the patient to sign the Blood Consent.     0428: Critical Blood Glucose Result of 48 reported to RN. D10 infusion started per protocol. Type and Screen sent at this time as well.     0545: D10 Infusion stopped. 2 Blood Glucose levels above 70 within 60 minutes of the start of the infusion.     0621: Blood Transfusion started. Lactated Ringers Infusion paused while Blood is being administered.       End of Shift Note    Bedside shift change report given to CRISTÓBAL Moralez (oncoming nurse) by Dhruv Bingham RN (offgoing nurse).  Report included the following information SBAR, ED Summary, Intake/Output, MAR, Recent Results, Cardiac Rhythm Sinus Bradycardia, Pre Procedure Checklist, Procedure Verification, Quality Measures, and Dual Neuro Assessment    Shift worked:  7:00 PM-7:30 AM     Shift summary and any significant changes:    See above.      Concerns for physician to address: Hypotension, Bradycardia, Hemoglobin, Lab Values, Hypoglycemia      Zone phone for oncoming shift:   N/A       Activity:  Level of Assistance: Moderate assist, patient does 50-74%  Number

## 2025-03-17 ENCOUNTER — APPOINTMENT (OUTPATIENT)
Facility: HOSPITAL | Age: 80
DRG: 309 | End: 2025-03-17
Payer: MEDICARE

## 2025-03-17 LAB
ABO + RH BLD: NORMAL
ANION GAP SERPL CALC-SCNC: 3 MMOL/L (ref 2–12)
BLD PROD TYP BPU: NORMAL
BLOOD BANK BLOOD PRODUCT EXPIRATION DATE: NORMAL
BLOOD BANK DISPENSE STATUS: NORMAL
BLOOD BANK ISBT PRODUCT BLOOD TYPE: 7300
BLOOD BANK PRODUCT CODE: NORMAL
BLOOD BANK UNIT TYPE AND RH: NORMAL
BLOOD GROUP ANTIBODIES SERPL: NORMAL
BPU ID: NORMAL
BUN SERPL-MCNC: 23 MG/DL (ref 6–20)
BUN/CREAT SERPL: 18 (ref 12–20)
CALCIUM SERPL-MCNC: 8.7 MG/DL (ref 8.5–10.1)
CHLORIDE SERPL-SCNC: 112 MMOL/L (ref 97–108)
CO2 SERPL-SCNC: 25 MMOL/L (ref 21–32)
CREAT SERPL-MCNC: 1.25 MG/DL (ref 0.55–1.02)
CROSSMATCH RESULT: NORMAL
ERYTHROCYTE [DISTWIDTH] IN BLOOD BY AUTOMATED COUNT: 17.3 % (ref 11.5–14.5)
GLUCOSE BLD STRIP.AUTO-MCNC: 108 MG/DL (ref 65–117)
GLUCOSE BLD STRIP.AUTO-MCNC: 121 MG/DL (ref 65–117)
GLUCOSE BLD STRIP.AUTO-MCNC: 125 MG/DL (ref 65–117)
GLUCOSE BLD STRIP.AUTO-MCNC: 172 MG/DL (ref 65–117)
GLUCOSE BLD STRIP.AUTO-MCNC: 199 MG/DL (ref 65–117)
GLUCOSE BLD STRIP.AUTO-MCNC: 72 MG/DL (ref 65–117)
GLUCOSE BLD STRIP.AUTO-MCNC: 82 MG/DL (ref 65–117)
GLUCOSE BLD STRIP.AUTO-MCNC: 86 MG/DL (ref 65–117)
GLUCOSE SERPL-MCNC: 64 MG/DL (ref 65–100)
HCT VFR BLD AUTO: 25.7 % (ref 35–47)
HGB BLD-MCNC: 8.4 G/DL (ref 11.5–16)
MCH RBC QN AUTO: 29.7 PG (ref 26–34)
MCHC RBC AUTO-ENTMCNC: 32.7 G/DL (ref 30–36.5)
MCV RBC AUTO: 90.8 FL (ref 80–99)
NRBC # BLD: 0.04 K/UL (ref 0–0.01)
NRBC BLD-RTO: 0.6 PER 100 WBC
PLATELET # BLD AUTO: 185 K/UL (ref 150–400)
PMV BLD AUTO: 10.8 FL (ref 8.9–12.9)
POTASSIUM SERPL-SCNC: 4.7 MMOL/L (ref 3.5–5.1)
RBC # BLD AUTO: 2.83 M/UL (ref 3.8–5.2)
SERVICE CMNT-IMP: ABNORMAL
SERVICE CMNT-IMP: NORMAL
SODIUM SERPL-SCNC: 140 MMOL/L (ref 136–145)
SPECIMEN EXP DATE BLD: NORMAL
UNIT DIVISION: 0
UNIT ISSUE DATE/TIME: NORMAL
WBC # BLD AUTO: 7 K/UL (ref 3.6–11)

## 2025-03-17 PROCEDURE — 82962 GLUCOSE BLOOD TEST: CPT

## 2025-03-17 PROCEDURE — 80048 BASIC METABOLIC PNL TOTAL CA: CPT

## 2025-03-17 PROCEDURE — 6370000000 HC RX 637 (ALT 250 FOR IP): Performed by: STUDENT IN AN ORGANIZED HEALTH CARE EDUCATION/TRAINING PROGRAM

## 2025-03-17 PROCEDURE — 2580000003 HC RX 258: Performed by: INTERNAL MEDICINE

## 2025-03-17 PROCEDURE — 2500000003 HC RX 250 WO HCPCS: Performed by: STUDENT IN AN ORGANIZED HEALTH CARE EDUCATION/TRAINING PROGRAM

## 2025-03-17 PROCEDURE — 2580000003 HC RX 258: Performed by: STUDENT IN AN ORGANIZED HEALTH CARE EDUCATION/TRAINING PROGRAM

## 2025-03-17 PROCEDURE — 2060000000 HC ICU INTERMEDIATE R&B

## 2025-03-17 PROCEDURE — 85027 COMPLETE CBC AUTOMATED: CPT

## 2025-03-17 PROCEDURE — 6360000002 HC RX W HCPCS: Performed by: STUDENT IN AN ORGANIZED HEALTH CARE EDUCATION/TRAINING PROGRAM

## 2025-03-17 PROCEDURE — 51798 US URINE CAPACITY MEASURE: CPT

## 2025-03-17 PROCEDURE — 71045 X-RAY EXAM CHEST 1 VIEW: CPT

## 2025-03-17 PROCEDURE — 36415 COLL VENOUS BLD VENIPUNCTURE: CPT

## 2025-03-17 PROCEDURE — 51702 INSERT TEMP BLADDER CATH: CPT

## 2025-03-17 RX ORDER — SODIUM CHLORIDE 9 MG/ML
INJECTION, SOLUTION INTRAVENOUS CONTINUOUS
Status: ACTIVE | OUTPATIENT
Start: 2025-03-17 | End: 2025-03-18

## 2025-03-17 RX ORDER — SODIUM CHLORIDE 9 MG/ML
INJECTION, SOLUTION INTRAVENOUS CONTINUOUS
Status: DISCONTINUED | OUTPATIENT
Start: 2025-03-17 | End: 2025-03-17

## 2025-03-17 RX ADMIN — SODIUM CHLORIDE, PRESERVATIVE FREE 10 ML: 5 INJECTION INTRAVENOUS at 23:37

## 2025-03-17 RX ADMIN — SODIUM CHLORIDE, POTASSIUM CHLORIDE, SODIUM LACTATE AND CALCIUM CHLORIDE: 600; 310; 30; 20 INJECTION, SOLUTION INTRAVENOUS at 03:59

## 2025-03-17 RX ADMIN — ATORVASTATIN CALCIUM 40 MG: 40 TABLET, FILM COATED ORAL at 08:11

## 2025-03-17 RX ADMIN — SODIUM CHLORIDE: 0.9 INJECTION, SOLUTION INTRAVENOUS at 08:06

## 2025-03-17 RX ADMIN — SODIUM CHLORIDE, PRESERVATIVE FREE 10 ML: 5 INJECTION INTRAVENOUS at 08:11

## 2025-03-17 RX ADMIN — IRON SUCROSE 100 MG: 20 INJECTION, SOLUTION INTRAVENOUS at 13:06

## 2025-03-17 RX ADMIN — SODIUM CHLORIDE: 0.9 INJECTION, SOLUTION INTRAVENOUS at 23:36

## 2025-03-17 NOTE — PROGRESS NOTES
0705 Bedside shift change report given to Obdulia RN (oncoming nurse) by Rhea RN (offgoing nurse). Report included the following information Nurse Handoff Report, MAR, Recent Results, and Cardiac Rhythm NSR/Sinus Diogenes .

## 2025-03-17 NOTE — CARE COORDINATION
Transition of Care Plan:    RUR: 17% \"medium risk\"  Prior Level of Functioning: Independent with ADL's   Disposition: Moderate intensity short-term skilled physical therapy up to 5x/week vs. Home with HH   ROBERT: 3/19  If SNF or IPR: Date FOC offered: 3/13  Date FOC received: Pending choices   Accepting facility: N/A  Date authorization started with reference number: N/A  Date authorization received and expires: N/A  Follow up appointments: PCP/Specialists as indicated  DME needed: None  Transportation at discharge: Family vs. BLS  IM/IMM Medicare/ letter given: 2nd IM needed prior to d/c   Is patient a  and connected with VA? No  Caregiver Contact: Jonathon Francis (daughter), [phone]   Discharge Caregiver contacted prior to discharge? To be contacted   Care Conference needed? Not at this time   Barriers to discharge: PPM, cardiology, clinical improvement    1512 PM: CM met with pt at the bedside who declined short term rehab. Pt noted she would like to go home with HH. CM contacted pt daughter to discuss, pt daughter to discuss with pt son and provide update to CM.     0941 AM: Chart reviewed. CM following for d/c planning. CM to discuss short-term rehab recommendation again with pt and pt daughter as they were unsure of decision on previous conversation. CM to continue to follow.    CONSTANCE Simon  Care Management  [facility]   [phone]

## 2025-03-17 NOTE — PROGRESS NOTES
Hospitalist Progress Note    NAME:   Zeenat Rolle   : 1945   MRN: [id number]     Date/Time: 3/17/2025 11:24 AM  Patient PCP: Lefty Segura MD    Estimated discharge date: 3/18  Barriers: PPM, urinary retention       Assessment / Plan:    Active Problems:  Failure to thrive  CA on CKD 3  Normocytic anemia, chronic-slightly worse from baseline  Concern for symptomatic bradycardia  Essential hypertension  Hyperlipidemia     Plan:  Symptomatic bradycardia  HTN  HLD  Cardiology on board  Planning for PPM  Continue with statin, aspirin  3/14  PPM implanted for earlier this week  Hemoglobin stability  Transfused if hemoglobin less than 7  3/16  S/p one unit of PRBCs  Check posttransfusion hemoglobin  Check again in a.m.  Aspirin, Lovenox held today  3/17  Hb 8.4    CA CKD  Hyperkalemia  1 dose of Lokelma given  Repeat potassium 5.6  Will consult nephrology  3/13  Patient potassium 8.5, bradycardia, and peak T wave  Stat hyperkalemia order, D10, insulin, calcium gluconate ordered  Lokelma and Lasix added  Discussed with nephrology too  Repeat K is 4.4  Recheck again in 4 hours  3/14  Low potassium diet  Potassium today 5.1  Neri catheter placed for retention  Urology consult  3/15  Urology input appreciated  Potassium remained WNL 4.8 today  Continue with low diet potassium    Anemia  Follow-up with iron panel and B12  IV iron    Medical Decision Making:   I personally reviewed labs:yes, CBC, BMP  I personally reviewed imaging:yes  I personally reviewed EKG:yes  Toxic drug monitoring: Yes, monitor platelet count  Discussed case with: Patient, CM, RN        Code Status: full code  DVT Prophylaxis: Lovenox  GI Prophylaxis: None indicated    Subjective:     Chief Complaint / Reason for Physician Visit  \"Patient seen at the bedside  \".  Discussed with RN events overnight.   No new symptoms    Objective:     VITALS:   Last 24hrs VS reviewed since prior progress note. Most recent are:  Patient Vitals for the

## 2025-03-17 NOTE — PROGRESS NOTES
0103: Notified MD Miranda of patient's rectal temperature (94.7). Attempted oral and axillary temperature multiple times with no success. Bear Hugger applied to patient. Vital signs stable, Blood Glucose reading is 72.    0219: Bladder Scan reading is 645 mL.     0221: D10 started for Blood Glucose level below 70.    0230: Patient up to bedside commode and voided 300 mL. Post-Void Residual per Bladder Scan is 421 mL. Per the order, if Post-Void Residual is greater than 350 mL, place vasquez. MD Miranda and MD Argueta notified.     0345: Vasquez inserted per order and both MD's aware. D10 stopped after 2 Blood Glucose readings above 70 within one hour of start of infusion.     End of Shift Note    Bedside shift change report given to CRISTÓBAL Steiner (oncoming nurse) by Dhruv Bingham RN (offgoing nurse).  Report included the following information SBAR, Intake/Output, MAR, Recent Results, Cardiac Rhythm Sinus Diogenes, Quality Measures, and Dual Neuro Assessment    Shift worked: 7:00 PM-7:30 AM     Shift summary and any significant changes:    See above.      Concerns for physician to address: Hypoglycemia, Urinary Retention, Bradycardia    Zone phone for oncoming shift:  N/A       Activity:  Level of Assistance: Moderate assist, patient does 50-74%  Number times ambulated in hallways past shift: 0  Number of times OOB to chair past shift: 0    Cardiac:   Cardiac Monitoring: Yes      Cardiac Rhythm: Sinus diogenes    Access:  Current line(s): PIV     Genitourinary:   Urinary Status: Voiding, Bathroom privileges    Respiratory:   O2 Device: None (Room air)  Chronic home O2 use?: NO  Incentive spirometer at bedside: NO    GI:  Last BM (including prior to admit): 03/16/25  Current diet:  ADULT DIET; Regular; Low Potassium (Less than 3000 mg/day)  Passing flatus: YES    Pain Management:   Patient states pain is manageable on current regimen: YES    Skin:  Eron Scale Score: 18  Interventions: Wound Offloading (Prevention

## 2025-03-17 NOTE — PLAN OF CARE
Problem: Discharge Planning  Goal: Discharge to home or other facility with appropriate resources  Outcome: Progressing     Problem: Safety - Adult  Goal: Free from fall injury  Outcome: Progressing     Problem: Respiratory - Adult  Goal: Achieves optimal ventilation and oxygenation  Outcome: Progressing     Problem: Cardiovascular - Adult  Goal: Maintains optimal cardiac output and hemodynamic stability  Outcome: Progressing  Goal: Absence of cardiac dysrhythmias or at baseline  Outcome: Progressing     Problem: Musculoskeletal - Adult  Goal: Return mobility to safest level of function  Outcome: Progressing  Goal: Maintain proper alignment of affected body part  Outcome: Progressing  Goal: Return ADL status to a safe level of function  Outcome: Progressing     Problem: Hematologic - Adult  Goal: Maintains hematologic stability  Outcome: Progressing     Problem: Skin/Tissue Integrity  Goal: Skin integrity remains intact  Description: 1.  Monitor for areas of redness and/or skin breakdown  2.  Assess vascular access sites hourly  3.  Every 4-6 hours minimum:  Change oxygen saturation probe site  4.  Every 4-6 hours:  If on nasal continuous positive airway pressure, respiratory therapy assess nares and determine need for appliance change or resting period  Outcome: Progressing

## 2025-03-17 NOTE — PROGRESS NOTES
[facility]     Progress Note      3/17/2025 1:56 PM  NAME: Zeenat Rolle   MRN:  [id number]   Admit Diagnosis: Bradycardia [R00.1]  Other fatigue [R53.83]  Symptomatic bradycardia [R00.1]     Problem List:     --Sinus Bradycardia  History of Syncope  Anemia (7.5)  Hyperkalemia (has had this)  Dyslipidemia  CKD 3 ([place]ou-Assi)  Hypoalbuminemia  Achalasia  GERD  PAD, Subclavian stenoisis  Former tobacco  Full Code  ,Lives alone, daughter visits daily  -t/-e/-s      Assessment/Plan:   Update 3/16/25  Getting blood transfusions for anemia.  No chest pain or shortness of breath.  Continue to have intermittent bradycardia  Blood pressure 98/59, heart rate 53  Creatinine 1.29> 1.12, hemoglobin 7.1> 6, potassium 4.8> 4.4  Alert and oriented x 3, no acute distress, lungs clear, regular rate rhythm, no pedal edema, systolic murmur    Recommendations:  On Lipitor  We will hold aspirin due to significant anemia  EP following for Ongoing discussion regarding pacemaker  Continue to monitor on telemetry          --EKG with SB rate 58 bpm,  ms, QRS 82 ms, Qtc 412 ms. Tele with SSS and pauses noted overnight to around 32. Avoid AV Jack blockers. She was offered PPM last fall and turned down. TTE with preserved LVEF and no sig valvular disease.   Her hemoglobin of 7.7 is contributing to her extreme fatigue. This will need to be worked up. She denies bleeding. She required unit of PRBCs in 8/24 for same.  Had a long conversation with daughter and son. I think permanent pacemaker is reasonable as she is bradycardic and it is very likely that her fatigue will improve follwing a pacemaker. I will plan to tentatively do this on Friday. She will likely needs a single chamber pacemaker only. Risks benefits and alternatives discussed at length with daughter and son. Daughter is her power of .   3/13 update: I spoke with patient, nursing and daughter. Will plan on PPM today. Patient is unable to

## 2025-03-17 NOTE — PROGRESS NOTES
End of Shift Note    Bedside shift change report given to Rhea AMBROCIO (oncoming nurse) by Obdulia Rolle RN (offgoing nurse).  Report included the following information SBAR, Kardex, MAR, Recent Results, and Cardiac Rhythm NSR/Sinus Diogenes    Shift worked:  4075-3209     Shift summary and any significant changes:    CXR ordered pending results.  Iron 100 mg IV ordered once  IVF changed to NS and decreased to 50 ml/hr;to be stopped tomorrow at 1200     Concerns for physician to address:  None      Zone phone for oncoming shift:          Activity:  Level of Assistance: Moderate assist, patient does 50-74%  Number times ambulated in hallways past shift: 0  Number of times OOB to chair past shift: 1    Cardiac:   Cardiac Monitoring: Yes      Cardiac Rhythm: Sinus diogenes, Sinus rhythm    Access:  Current line(s): PIV     Genitourinary:   Urinary Status: Neri    Respiratory:   O2 Device: None (Room air)  Chronic home O2 use?: NO  Incentive spirometer at bedside: NO    GI:  Last BM (including prior to admit): 03/16/25  Current diet:  ADULT DIET; Regular; Low Potassium (Less than 3000 mg/day)  Passing flatus: YES    Pain Management:   Patient states pain is manageable on current regimen: YES    Skin:  Eron Scale Score: 19  Interventions: Wound Offloading (Prevention Methods): Bed, pressure redistribution/air, Bed, pressure reduction mattress, Pillows, Repositioning, Turning, Elevate heels    Patient Safety:  Fall Risk: Nursing Judgement-Fall Risk High(Add Comments): Yes  Fall Risk Interventions  Nursing Judgement-Fall Risk High(Add Comments): Yes  Toilet Every 2 Hours-In Advance of Need: Yes  Hourly Visual Checks: Awake  Fall Visual Posted: Armband, Socks, Fall sign posted  Room Door Open: Yes  Alarm On: Bed  Patient Moved Closer to Nursing Station: No    Active Consults:   IP CONSULT TO CARDIOLOGY  IP CONSULT TO NEPHROLOGY  IP CONSULT TO UROLOGY    Length of Stay:  Expected LOS: 9  Actual LOS: 3    Obdulia Rolle  RN

## 2025-03-17 NOTE — PLAN OF CARE
Problem: Discharge Planning  Goal: Discharge to home or other facility with appropriate resources  3/17/2025 1003 by Obdulia Rolle RN  Outcome: Progressing  Flowsheets (Taken 3/17/2025 1003)  Discharge to home or other facility with appropriate resources:   Identify barriers to discharge with patient and caregiver   Identify discharge learning needs (meds, wound care, etc)  3/16/2025 2201 by Dhruv Bingham RN  Outcome: Progressing     Problem: Safety - Adult  Goal: Free from fall injury  3/17/2025 1003 by Obdulia Rolle RN  Outcome: Progressing  Flowsheets (Taken 3/17/2025 1003)  Free From Fall Injury:   Based on caregiver fall risk screen, instruct family/caregiver to ask for assistance with transferring infant if caregiver noted to have fall risk factors   Instruct family/caregiver on patient safety  3/16/2025 2201 by Dhruv Bingham RN  Outcome: Progressing     Problem: Respiratory - Adult  Goal: Achieves optimal ventilation and oxygenation  3/17/2025 1003 by Obdulia Rolle RN  Outcome: Progressing  Flowsheets (Taken 3/17/2025 1003)  Achieves optimal ventilation and oxygenation: Assess for changes in respiratory status  3/16/2025 2201 by Dhruv Bingham RN  Outcome: Progressing     Problem: Cardiovascular - Adult  Goal: Maintains optimal cardiac output and hemodynamic stability  3/17/2025 1003 by Obdulia Rolle RN  Outcome: Progressing  Flowsheets (Taken 3/17/2025 1003)  Maintains optimal cardiac output and hemodynamic stability: Monitor blood pressure and heart rate  3/16/2025 2201 by Dhruv Bingham RN  Outcome: Progressing  Goal: Absence of cardiac dysrhythmias or at baseline  3/17/2025 1003 by Obdulia Rolle RN  Flowsheets (Taken 3/17/2025 1003)  Absence of cardiac dysrhythmias or at baseline: Monitor cardiac rate and rhythm  3/16/2025 2201 by Dhruv Bingham RN  Outcome: Progressing     Problem: Musculoskeletal - Adult  Goal: Return mobility to safest level of function  3/17/2025

## 2025-03-17 NOTE — PROGRESS NOTES
NAME: Zeenat Rolle        :  1945        MRN:  [id number]         Assessment:    acute on chronic hyperkalemia    CA on CKD    Anemia    Urinary retention    HTN Discussion:         baseline creatinine appears to be 1.2-1.4, cr today is 1.25             Neri catheter    S/p Lokelma. K 4.7 today; given hyperkalemia last week will stop LR and give NS.       Subjective:     Chief Complaint:  alert. Denies complaint. Eating lunch at the time of my visit. On IVF's due to low bp.       Review of Systems:    Symptom Y/N Comments  Symptom Y/N Comments   Fever/Chills    Chest Pain     Poor Appetite    Edema     Cough    Abdominal Pain     Sputum    Joint Pain     SOB/CANNON    Pruritis/Rash     Nausea/vomit    Tolerating PT/OT     Diarrhea    Tolerating Diet     Constipation    Other       Could not obtain due to:      Objective:     VITALS:   Last 24hrs VS reviewed since prior progress note. Most recent are:  Vitals:    25 0620   BP: (!) 143/55   Pulse: 62   Resp: 17   Temp:    SpO2: 95%       Intake/Output Summary (Last 24 hours) at 3/17/2025 0634  Last data filed at 3/17/2025 0620  Gross per 24 hour   Intake 910.57 ml   Output 1300 ml   Net -389.43 ml      Telemetry Reviewed:     PHYSICAL EXAM:  General: NAD      Lab Data Reviewed: (see below)    Medications Reviewed: (see below)    PMH/SH reviewed - no change compared to H&P  ________________________________________________________________________  Care Plan discussed with:  Patient     Family      RN     Care Manager                    Consultant:          Comments   >50% of visit spent in counseling and coordination of care       ________________________________________________________________________  Amanda Khoury MD     Procedures: see electronic medical records for all procedures/Xrays and details which  were not copied into this note but were reviewed prior to creation of Plan.   ondansetron (ZOFRAN-ODT) disintegrating tablet 4 mg  4 mg Oral Q8H PRN    Or    ondansetron (ZOFRAN) injection 4 mg  4 mg IntraVENous Q6H PRN    polyethylene glycol (GLYCOLAX) packet 17 g  17 g Oral Daily PRN    acetaminophen (TYLENOL) tablet 650 mg  650 mg Oral Q6H PRN    Or    acetaminophen (TYLENOL) suppository 650 mg  650 mg Rectal Q6H PRN    melatonin tablet 3 mg  3 mg Oral Nightly PRN

## 2025-03-18 PROBLEM — E44.0 MODERATE PROTEIN-CALORIE MALNUTRITION: Status: ACTIVE | Noted: 2025-03-18

## 2025-03-18 LAB
ANION GAP SERPL CALC-SCNC: 5 MMOL/L (ref 2–12)
BUN SERPL-MCNC: 21 MG/DL (ref 6–20)
BUN/CREAT SERPL: 17 (ref 12–20)
CALCIUM SERPL-MCNC: 8.8 MG/DL (ref 8.5–10.1)
CHLORIDE SERPL-SCNC: 112 MMOL/L (ref 97–108)
CO2 SERPL-SCNC: 25 MMOL/L (ref 21–32)
CREAT SERPL-MCNC: 1.23 MG/DL (ref 0.55–1.02)
ERYTHROCYTE [DISTWIDTH] IN BLOOD BY AUTOMATED COUNT: 17.4 % (ref 11.5–14.5)
GLUCOSE BLD STRIP.AUTO-MCNC: 124 MG/DL (ref 65–117)
GLUCOSE BLD STRIP.AUTO-MCNC: 125 MG/DL (ref 65–117)
GLUCOSE BLD STRIP.AUTO-MCNC: 90 MG/DL (ref 65–117)
GLUCOSE BLD STRIP.AUTO-MCNC: 90 MG/DL (ref 65–117)
GLUCOSE SERPL-MCNC: 79 MG/DL (ref 65–100)
HCT VFR BLD AUTO: 26.2 % (ref 35–47)
HGB BLD-MCNC: 8.4 G/DL (ref 11.5–16)
MCH RBC QN AUTO: 29.7 PG (ref 26–34)
MCHC RBC AUTO-ENTMCNC: 32.1 G/DL (ref 30–36.5)
MCV RBC AUTO: 92.6 FL (ref 80–99)
NRBC # BLD: 0.03 K/UL (ref 0–0.01)
NRBC BLD-RTO: 0.2 PER 100 WBC
PLATELET # BLD AUTO: 192 K/UL (ref 150–400)
PMV BLD AUTO: 10.4 FL (ref 8.9–12.9)
POTASSIUM SERPL-SCNC: 4.2 MMOL/L (ref 3.5–5.1)
RBC # BLD AUTO: 2.83 M/UL (ref 3.8–5.2)
SERVICE CMNT-IMP: ABNORMAL
SERVICE CMNT-IMP: ABNORMAL
SERVICE CMNT-IMP: NORMAL
SERVICE CMNT-IMP: NORMAL
SODIUM SERPL-SCNC: 142 MMOL/L (ref 136–145)
T4 FREE SERPL-MCNC: 1.1 NG/DL (ref 0.8–1.5)
TSH SERPL DL<=0.05 MIU/L-ACNC: 2.85 UIU/ML (ref 0.36–3.74)
WBC # BLD AUTO: 12.7 K/UL (ref 3.6–11)

## 2025-03-18 PROCEDURE — 84439 ASSAY OF FREE THYROXINE: CPT

## 2025-03-18 PROCEDURE — 6370000000 HC RX 637 (ALT 250 FOR IP): Performed by: STUDENT IN AN ORGANIZED HEALTH CARE EDUCATION/TRAINING PROGRAM

## 2025-03-18 PROCEDURE — 85027 COMPLETE CBC AUTOMATED: CPT

## 2025-03-18 PROCEDURE — 97110 THERAPEUTIC EXERCISES: CPT

## 2025-03-18 PROCEDURE — 2060000000 HC ICU INTERMEDIATE R&B

## 2025-03-18 PROCEDURE — 82962 GLUCOSE BLOOD TEST: CPT

## 2025-03-18 PROCEDURE — 84443 ASSAY THYROID STIM HORMONE: CPT

## 2025-03-18 PROCEDURE — 97116 GAIT TRAINING THERAPY: CPT

## 2025-03-18 PROCEDURE — 36415 COLL VENOUS BLD VENIPUNCTURE: CPT

## 2025-03-18 PROCEDURE — 2500000003 HC RX 250 WO HCPCS: Performed by: STUDENT IN AN ORGANIZED HEALTH CARE EDUCATION/TRAINING PROGRAM

## 2025-03-18 PROCEDURE — 80048 BASIC METABOLIC PNL TOTAL CA: CPT

## 2025-03-18 RX ADMIN — DEXTRAN 70, GLYCERIN, HYPROMELLOSE 1 DROP: 1; 2; 3 SOLUTION/ DROPS OPHTHALMIC at 12:01

## 2025-03-18 RX ADMIN — ATORVASTATIN CALCIUM 40 MG: 40 TABLET, FILM COATED ORAL at 08:40

## 2025-03-18 RX ADMIN — SODIUM CHLORIDE, PRESERVATIVE FREE 10 ML: 5 INJECTION INTRAVENOUS at 21:00

## 2025-03-18 ASSESSMENT — PAIN SCALES - GENERAL: PAINLEVEL_OUTOF10: 0

## 2025-03-18 NOTE — CARE COORDINATION
Transition of Care Plan:    RUR: 17% \"medium risk\"  Prior Level of Functioning: Independent with ADL's   Disposition: Moderate intensity short-term skilled physical therapy up to 5x/week vs. Home with HH   ROBERT: 3/19  If SNF or IPR: Date FOC offered: 3/13  Date FOC received: 3/18  Accepting facility: N/A  Date authorization started with reference number: N/A  Date authorization received and expires: N/A  Follow up appointments: PCP/Specialists as indicated  DME needed: None  Transportation at discharge: Family vs. BLS  IM/IMM Medicare/ letter given: 2nd IM needed prior to d/c   Is patient a [place] and connected with [facility]? No  Caregiver Contact: Jonathon Francis (daughter), [phone]   Discharge Caregiver contacted prior to discharge? To be contacted   Care Conference needed? Not at this time   Barriers to discharge: Clinical improvement, SNF placement/auth     1637 PM: Chart reviewed. CM received SNF choices from pt son at the bedside. CM sent SNF referrals to facilities of pt son preference; though, both facilities ([facility] declined due to pt insurance. CM contacted pt son to receive additional SNF choices, CM left voicemail.     CONSTANCE Simon  Care Management  [facility] [place]   [phone]

## 2025-03-18 NOTE — PLAN OF CARE
Problem: Discharge Planning  Goal: Discharge to home or other facility with appropriate resources  3/17/2025 2252 by Dhruv Bingham RN  Outcome: Progressing  3/17/2025 1003 by Obdulia Rolle RN  Outcome: Progressing  Flowsheets (Taken 3/17/2025 1003)  Discharge to home or other facility with appropriate resources:   Identify barriers to discharge with patient and caregiver   Identify discharge learning needs (meds, wound care, etc)     Problem: Safety - Adult  Goal: Free from fall injury  3/17/2025 2252 by Dhruv Bingham RN  Outcome: Progressing  3/17/2025 1003 by Obdulia Rolle RN  Outcome: Progressing  Flowsheets (Taken 3/17/2025 1003)  Free From Fall Injury:   Based on caregiver fall risk screen, instruct family/caregiver to ask for assistance with transferring infant if caregiver noted to have fall risk factors   Instruct family/caregiver on patient safety     Problem: Respiratory - Adult  Goal: Achieves optimal ventilation and oxygenation  3/17/2025 2252 by Dhruv Bingham RN  Outcome: Progressing  3/17/2025 1003 by Obdulia Rolle RN  Outcome: Progressing  Flowsheets (Taken 3/17/2025 1003)  Achieves optimal ventilation and oxygenation: Assess for changes in respiratory status     Problem: Cardiovascular - Adult  Goal: Maintains optimal cardiac output and hemodynamic stability  3/17/2025 2252 by Dhruv Bingham RN  Outcome: Progressing  3/17/2025 1003 by Obdulia Rolle RN  Outcome: Progressing  Flowsheets (Taken 3/17/2025 1003)  Maintains optimal cardiac output and hemodynamic stability: Monitor blood pressure and heart rate  Goal: Absence of cardiac dysrhythmias or at baseline  3/17/2025 2252 by Dhruv Bingham RN  Outcome: Progressing  3/17/2025 1003 by Obdulia Rolle RN  Flowsheets (Taken 3/17/2025 1003)  Absence of cardiac dysrhythmias or at baseline: Monitor cardiac rate and rhythm     Problem: Musculoskeletal - Adult  Goal: Return mobility to safest level of function  3/17/2025

## 2025-03-18 NOTE — PLAN OF CARE
Problem: Physical Therapy - Adult  Goal: By Discharge: Performs mobility at highest level of function for planned discharge setting.  See evaluation for individualized goals.  Description: FUNCTIONAL STATUS PRIOR TO ADMISSION: Pt poor hx.  Stated she uses device but unclear.  Was using SPC when admitted 8/2024.    HOME SUPPORT PRIOR TO ADMISSION: The patient lived alone with DTR to provide assistance.    Physical Therapy Goals  Initiated 3/12/2025  1.  Patient will move from supine to sit and sit to supine in bed with supervision/set-up within 7 day(s).    2.  Patient will perform sit to stand with supervision/set-up within 7 day(s).  3.  Patient will transfer from bed to chair and chair to bed with supervision/set-up using the least restrictive device within 7 day(s).  4.  Patient will ambulate with supervision/set-up for 150 feet with the least restrictive device within 7 day(s).   5.  Patient will ascend/descend 12 stairs with 1 handrail(s) with contact guard assist within 7 day(s).    Outcome: Progressing   PHYSICAL THERAPY TREATMENT    Patient: Zeenat Rolle (79 y.o. female)  Date: 3/18/2025  Diagnosis: Bradycardia [R00.1]  Other fatigue [R53.83]  Symptomatic bradycardia [R00.1] Bradycardia  Procedure(s) (LRB):  Insert PPM dual (N/A) 5 Days Post-Op  Precautions: Restrictions/Precautions  Restrictions/Precautions: Fall Risk            ASSESSMENT:  Patient continues to benefit from skilled PT services and is progressing towards goals. She demonstrates markedly improved mobility from last session. There is no evidence of LE buckling but patient continues to demonstrate asymmetric R step with narrow GURWINDER. Patient is able to ambulate increased distance with RW support. She demonstrates no overt LOB or path deviations. She demonstrates increased difficulty managing RW in order to back up to sit down in bedside chair. She requires VC and tactile cues for RW management. Patient would benefit from continued gait

## 2025-03-18 NOTE — PROGRESS NOTES
[facility]  Received a call from primary team ( Dr. Tong) stating patient noted to have bladder scan 645 ml , patient up to bedside with 300 ml voided and repeat bladder scan 421 ml. Neri was replaced 3/18 2 am for leaking catheter- now draining w/o issues.  Renal function 1.25 mg today with UOP 1.1 L    PLAN:   Neri to remain in place x 7 days with follow up appointment with  [facility] for voiding trial.  Our office will call the patient to make an appointment.

## 2025-03-18 NOTE — PROGRESS NOTES
NAME: Zeenat Rolle        :  1945        MRN:  [id number]         Assessment:    acute on chronic hyperkalemia    CA on CKD    Anemia    Urinary retention    HTN Discussion:         baseline creatinine appears to be 1.2-1.4, cr stable at baseline (1.23)             Neri catheter    S/p Lokelma. K 4.2 today  On NS due to hypotension  S/p iv iron       Subjective:     Chief Complaint:  alert. Denies complaint. Reading the paper    Review of Systems:    Symptom Y/N Comments  Symptom Y/N Comments   Fever/Chills    Chest Pain     Poor Appetite    Edema     Cough    Abdominal Pain     Sputum    Joint Pain     SOB/CANNON    Pruritis/Rash     Nausea/vomit    Tolerating PT/OT     Diarrhea    Tolerating Diet     Constipation    Other       Could not obtain due to:      Objective:     VITALS:   Last 24hrs VS reviewed since prior progress note. Most recent are:  Vitals:    25 0100   BP: (!) 138/53   Pulse: 59   Resp: 23   Temp:    SpO2: 96%       Intake/Output Summary (Last 24 hours) at 3/18/2025 0627  Last data filed at 3/17/2025 2351  Gross per 24 hour   Intake 1313.48 ml   Output 700 ml   Net 613.48 ml      Telemetry Reviewed:     PHYSICAL EXAM:  General: NAD      Lab Data Reviewed: (see below)    Medications Reviewed: (see below)    PMH/SH reviewed - no change compared to H&P  ________________________________________________________________________  Care Plan discussed with:  Patient     Family      RN     Care Manager                    Consultant:          Comments   >50% of visit spent in counseling and coordination of care       ________________________________________________________________________  Amanda Khoury MD     Procedures: see electronic medical records for all procedures/Xrays and details which  were not copied into this note but were reviewed prior to creation of Plan.      LABS:  Recent Labs     25  0032

## 2025-03-18 NOTE — PROGRESS NOTES
Comprehensive Nutrition Assessment    Type and Reason for Visit:  Initial, Consult    Nutrition Recommendations/Plan:   Continue current diet  Trying Nepro shakes BID  Please document % meals and supplements consumed in flowsheet I/O's under intake      Malnutrition Assessment:  Malnutrition Status:  Moderate malnutrition (03/18/25 1345)    Context:  Social/Environmental Circumstances     Findings of the 6 clinical characteristics of malnutrition:  Energy Intake:  Less than 75% estimated energy requirements for 3 months or longer  Weight Loss:  Mild weight loss     Body Fat Loss:  Mild body fat loss Orbital, Triceps, Fat Overlying Ribs   Muscle Mass Loss:  Severe muscle mass loss Clavicles (pectoralis & deltoids), Temples (temporalis), Hand (interosseous)  Fluid Accumulation:  No fluid accumulation     Strength:  Not Performed    Nutrition Assessment:     Consult received for poor appetite. Pt medically noted for failure to thrive, CA on CKD3, anemia, HTN, HLD. Pt reports her appetite has been variable, but her daughter pointed out that she has lost weight and her clothes have gotten too big. Pt reports her usual weight is around 120# with the lowest coming down to 115#. She has multiple supportive family members who have already been very encouraging to improve her intake. Pt agreeable to try supplements daily in addition to meals. Significant fat and muscle wasting present on NFPE. Will continue monitoring.     Patient Vitals for the past 120 hrs:   PO Meals Eaten (%)   03/15/25 1232 76 - 100%   03/15/25 0935 26 - 50%   03/14/25 1739 76 - 100%   03/14/25 1100 26 - 50%   03/14/25 0856 1 - 25%   03/13/25 1705 26 - 50%     Wt Readings from Last 5 Encounters:   03/18/25 55.8 kg (123 lb 0.3 oz)   11/12/24 54.6 kg (120 lb 6.4 oz)   09/03/24 54.6 kg (120 lb 6.4 oz)   08/15/24 60.5 kg (133 lb 6.4 oz)   08/06/24 60.6 kg (133 lb 8 oz)   ]    Nutrition Related Findings:    Labs: Cr 1.23, BUN 21, -90-86.   Meds:  Lipitor.    3/16.   Wound Type: None       Current Nutrition Intake & Therapies:    Average Meal Intake: 51-75%, %  Average Supplements Intake: None Ordered  ADULT DIET; Regular; Low Potassium (Less than 3000 mg/day)  ADULT ORAL NUTRITION SUPPLEMENT; Breakfast, Dinner; Renal Oral Supplement    Anthropometric Measures:  Height: 160 cm (5' 3\")  Ideal Body Weight (IBW): 115 lbs (52 kg)       Current Body Weight: 55.8 kg (123 lb 0.3 oz), 107 % IBW. Weight Source: Bed scale  Current BMI (kg/m2): 21.8           Weight Adjustment For: No Adjustment                 BMI Categories: Underweight (BMI less than 22) age over 65    Estimated Daily Nutrient Needs:  Energy Requirements Based On: Kcal/kg  Weight Used for Energy Requirements: Current  Energy (kcal/day): 8833-5791 kcals (28-30 kcals/kg)  Weight Used for Protein Requirements: Current  Protein (g/day): 45-56g (0.8-1.0g/kg)  Method Used for Fluid Requirements: 1 ml/kcal  Fluid (ml/day): 1600mL    Nutrition Diagnosis:   Moderate malnutrition, in context of social or environmental circumstances related to inadequate protein-energy intake as evidenced by criteria as identified in malnutrition assessment    Nutrition Interventions:   Food and/or Nutrient Delivery: Continue Current Diet, Start Oral Nutrition Supplement  Nutrition Education/Counseling: No recommendation at this time  Coordination of Nutrition Care: Continue to monitor while inpatient       Goals:  Goals: PO intake 75% or greater, by next RD assessment          Nutrition Monitoring and Evaluation:   Behavioral-Environmental Outcomes: None Identified  Food/Nutrient Intake Outcomes: Food and Nutrient Intake, Supplement Intake  Physical Signs/Symptoms Outcomes: Biochemical Data, GI Status, Fluid Status or Edema, Nutrition Focused Physical Findings, Weight    Discharge Planning:    Continue current diet, Continue Oral Nutrition Supplement     Gabriella Gilbert RD  Contact: [phone]

## 2025-03-18 NOTE — PROGRESS NOTES
0200: Vasquez removed and replaced with a new vasquez catheter due to balloon malfunction. Patient's vasquez had been leaking and would not stay inflated. New 16 FR vasquez catheter draining with yellow, clear urine.     End of Shift Note    Bedside shift change report given to CRISTÓBAL Garsia (oncoming nurse) by Dhruv Bingham RN (offgoing nurse).  Report included the following information SBAR, ED Summary, Intake/Output, MAR, Recent Results, Cardiac Rhythm Sinus Diogenes/Sinus Rhythm, Quality Measures, and Dual Neuro Assessment    Shift worked: 7:00 PM-7:30 AM     Shift summary and any significant changes:    Patient remains on 50 mL/hr of Normal Saline. Patient's labs were drawn and resulted.    Concerns for physician to address: Urology Consult- New Vasquez placed with intact balloon and draining urine, however patient is still experiencing drainage around the vasquez catheter- potentially bladder spasms. Patient's Heart Rate still remains in the 30s-50s throughout the night.    Zone phone for oncoming shift:  N/A       Activity:  Level of Assistance: Moderate assist, patient does 50-74%  Number times ambulated in hallways past shift: 0  Number of times OOB to chair past shift: 1    Cardiac:   Cardiac Monitoring: Yes      Cardiac Rhythm: Sinus rhythm, Sinus diogenes    Access:  Current line(s): PIV     Genitourinary:   Urinary Status: Voiding, Vasquez    Respiratory:   O2 Device: None (Room air)  Chronic home O2 use?: NO  Incentive spirometer at bedside: NO    GI:  Last BM (including prior to admit): 03/16/25  Current diet:  ADULT DIET; Regular; Low Potassium (Less than 3000 mg/day)  Passing flatus: YES    Pain Management:   Patient states pain is manageable on current regimen: YES    Skin:  Eron Scale Score: 18  Interventions: Wound Offloading (Prevention Methods): Bed, pressure redistribution/air, Bed, pressure reduction mattress, Elevate heels, Pillows, Repositioning, Turning, Walker    Patient Safety:  Fall Risk: Nursing  Judgement-Fall Risk High(Add Comments): Yes  Fall Risk Interventions  Nursing Judgement-Fall Risk High(Add Comments): Yes  Toilet Every 2 Hours-In Advance of Need: Yes  Hourly Visual Checks: Awake, In bed  Fall Visual Posted: Armband, Socks, Fall sign posted  Room Door Open: Yes  Alarm On: Bed  Patient Moved Closer to Nursing Station: No    Active Consults:   IP CONSULT TO CARDIOLOGY  IP CONSULT TO NEPHROLOGY  IP CONSULT TO UROLOGY  IP CONSULT TO DIETITIAN    Length of Stay:  Expected LOS: 9  Actual LOS: 4    Dhruv Bingham RN

## 2025-03-18 NOTE — PLAN OF CARE
Problem: Occupational Therapy - Adult  Goal: By Discharge: Performs self-care activities at highest level of function for planned discharge setting.  See evaluation for individualized goals.  Description: FUNCTIONAL STATUS PRIOR TO ADMISSION:  Patient was ambulatory using RW and states she was Mod Indep with ADLs, living alone, with daughter checking in with her daily.   ,  ,  ,  ,  ,  ,  ,  ,  ,  ,       HOME SUPPORT: Patient lived alone with local daughter checking in with patient on a daily basis.    Occupational Therapy Goals:  Initiated 3/12/2025  1.  Patient will perform seated self-feeding with Set-up within 7 day(s).  2.  Patient will perform seated grooming with Set-up within 7 day(s).  3.  Patient will perform seated bathing with Minimal Assist within 7 day(s).  4.  Patient will perform RW toilet transfers with Contact Guard Assist  within 7 day(s).  5.  Patient will perform all aspects of RW toileting with Contact Guard Assist within 7 day(s).    Outcome: Progressing   OCCUPATIONAL THERAPY TREATMENT  Patient: Zeenat Rolle (79 y.o. female)  Date: 3/18/2025  Primary Diagnosis: Bradycardia [R00.1]  Other fatigue [R53.83]  Symptomatic bradycardia [R00.1]  Procedure(s) (LRB):  Insert PPM dual (N/A) 5 Days Post-Op   Precautions: Fall Risk                Chart, occupational therapy assessment, plan of care, and goals were reviewed.    ASSESSMENT  Patient continues to benefit from skilled OT services and is progressing towards goals. Received in chair and agreeable to OT session. Stand step with RW to/from [facility] with minimal assist today with improved standing balance and endurance. Improving gross grasp and dexterity to manage items on tray and cell phone. End of session patient in recliner with all needs met, alarm activated.       PLAN :  Patient continues to benefit from skilled intervention to address the above impairments.  Continue treatment per established plan of care to address goals.    Recommend  Clinical Factors: RW     Product Used : Other (comment) (wet cloth)    Pain Rating:  No c/o pain    Activity Tolerance:   Good  Please refer to the flowsheet for vital signs taken during this treatment.    After treatment:   Patient left in no apparent distress sitting up in chair, Call bell within reach, Bed/ chair alarm activated, and Updated patient's board on functional status and mobility recommendations    COMMUNICATION/EDUCATION:   The patient's plan of care was discussed with: physical therapist       Thank you for this referral.  Marie Beltran OT  Minutes: 11

## 2025-03-18 NOTE — PROGRESS NOTES
Hospitalist Progress Note    NAME:   Zeenat Rolle   : 1945   MRN: [id number]     Date/Time: 3/18/2025 1:13 PM  Patient PCP: Lefty Segura MD    Estimated discharge date: 3/19  Barriers: Medically ready need rehab    Assessment / Plan:    Active Problems:  Failure to thrive  CA on CKD 3  Normocytic anemia, chronic-slightly worse from baseline  Concern for symptomatic bradycardia  Essential hypertension  Hyperlipidemia     Plan:  Symptomatic bradycardia  HTN  HLD  Cardiology on board  Planning for PPM  Continue with statin, aspirin  3/14  PPM implanted for earlier this week  Hemoglobin stability  Transfused if hemoglobin less than 7  3/16  S/p one unit of PRBCs  Check posttransfusion hemoglobin  Check again in a.m.  Aspirin, Lovenox held today  3/17  Hb 8.4  3/19  Discussed with Dr. Mackenzie, will hold off PPM for now  Cleared for discharge  Heart rate remained above 50 over the past 24 hours    CA CKD  Hyperkalemia  1 dose of Lokelma given  Repeat potassium 5.6  Will consult nephrology  3/13  Patient potassium 8.5, bradycardia, and peak T wave  Stat hyperkalemia order, D10, insulin, calcium gluconate ordered  Lokelma and Lasix added  Discussed with nephrology too  Repeat K is 4.4  Recheck again in 4 hours   3/14  Low potassium diet  Potassium today 5.1  Neri catheter placed for retention  Urology consult  3/15  Urology input appreciated  Potassium remained WNL 4.8 today  Continue with low diet potassium  3/18  [place] [facility] will follow with the patient at [place] office if patient discharged home for voiding trial  Keep Neri for 7 days  Nephro also cleared for DC      Anemia  Follow-up with iron panel and B12  IV iron    Medical Decision Making:   I personally reviewed labs:yes, CBC, BMP  I personally reviewed imaging:yes  I personally reviewed EKG:yes  Toxic drug monitoring: Yes, monitor platelet count  Discussed case with: Patient, CM, RN discussed plan of management with the patient daughter,  current orders and MAR    YES  PMH/SH reviewed - no change compared to H&P    Procedures: see electronic medical records for all procedures/Xrays and details which were not copied into this note but were reviewed prior to creation of Plan.      LABS:  I reviewed today's most current labs and imaging studies.  Pertinent labs include:  Recent Labs     03/16/25  0303 03/16/25  1134 03/17/25  0030 03/18/25  0126   WBC 7.8  --  7.0 12.7*   HGB 6.0* 8.8* 8.4* 8.4*   HCT 19.1* 26.6* 25.7* 26.2*     --  185 192     Recent Labs     03/16/25  0303 03/17/25  0030 03/18/25  0126    140 142   K 4.4 4.7 4.2   * 112* 112*   CO2 24 25 25   GLUCOSE 48* 64* 79   BUN 24* 23* 21*   CREATININE 1.12* 1.25* 1.23*   CALCIUM 8.5 8.7 8.8       Signed: Jesenia Tong MD

## 2025-03-18 NOTE — PLAN OF CARE
Problem: Discharge Planning  Goal: Discharge to home or other facility with appropriate resources  3/18/2025 0947 by Sun Valladares RN  Outcome: Progressing  3/17/2025 2252 by Dhruv Bingham RN  Outcome: Progressing     Problem: Safety - Adult  Goal: Free from fall injury  3/18/2025 0947 by Sun Valladares RN  Outcome: Progressing  3/17/2025 2252 by Dhruv Bingham RN  Outcome: Progressing     Problem: Respiratory - Adult  Goal: Achieves optimal ventilation and oxygenation  3/18/2025 0947 by Sun Valladares RN  Outcome: Progressing  3/17/2025 2252 by Dhruv Bingham RN  Outcome: Progressing     Problem: Cardiovascular - Adult  Goal: Maintains optimal cardiac output and hemodynamic stability  3/18/2025 0947 by Sun Valladares RN  Outcome: Progressing  3/17/2025 2252 by Dhruv Bingham RN  Outcome: Progressing  Goal: Absence of cardiac dysrhythmias or at baseline  3/18/2025 0947 by Sun Valladares RN  Outcome: Progressing  3/17/2025 2252 by Dhruv Bingham RN  Outcome: Progressing     Problem: Musculoskeletal - Adult  Goal: Return mobility to safest level of function  3/18/2025 0947 by Sun Valladares RN  Outcome: Progressing  3/17/2025 2252 by Dhruv Bingham RN  Outcome: Progressing  Goal: Maintain proper alignment of affected body part  3/18/2025 0947 by Sun Valladares RN  Outcome: Progressing  3/17/2025 2252 by Dhruv Bingham RN  Outcome: Progressing  Goal: Return ADL status to a safe level of function  3/18/2025 0947 by Sun Valladares RN  Outcome: Progressing  3/17/2025 2252 by Dhruv Bingham RN  Outcome: Progressing     Problem: Hematologic - Adult  Goal: Maintains hematologic stability  3/18/2025 0947 by Sun Valladares RN  Outcome: Progressing  3/17/2025 2252 by Dhruv Bingham RN  Outcome: Progressing     Problem: Skin/Tissue Integrity  Goal: Skin integrity remains intact  Description: 1.  Monitor for areas of redness and/or skin breakdown  2.  Assess vascular access sites

## 2025-03-19 LAB
AMORPH CRY URNS QL MICRO: ABNORMAL
ANION GAP SERPL CALC-SCNC: 3 MMOL/L (ref 2–12)
APPEARANCE UR: ABNORMAL
BACTERIA URNS QL MICRO: ABNORMAL /HPF
BILIRUB UR QL: NEGATIVE
BUN SERPL-MCNC: 25 MG/DL (ref 6–20)
BUN/CREAT SERPL: 22 (ref 12–20)
CALCIUM SERPL-MCNC: 8.9 MG/DL (ref 8.5–10.1)
CHLORIDE SERPL-SCNC: 111 MMOL/L (ref 97–108)
CO2 SERPL-SCNC: 25 MMOL/L (ref 21–32)
COLOR UR: ABNORMAL
CREAT SERPL-MCNC: 1.15 MG/DL (ref 0.55–1.02)
EPITH CASTS URNS QL MICRO: ABNORMAL /LPF
ERYTHROCYTE [DISTWIDTH] IN BLOOD BY AUTOMATED COUNT: 17 % (ref 11.5–14.5)
GLUCOSE BLD STRIP.AUTO-MCNC: 103 MG/DL (ref 65–117)
GLUCOSE BLD STRIP.AUTO-MCNC: 72 MG/DL (ref 65–117)
GLUCOSE SERPL-MCNC: 59 MG/DL (ref 65–100)
GLUCOSE UR STRIP.AUTO-MCNC: NEGATIVE MG/DL
GRAN CASTS URNS QL MICRO: ABNORMAL /LPF
HCT VFR BLD AUTO: 27.4 % (ref 35–47)
HGB BLD-MCNC: 8.8 G/DL (ref 11.5–16)
HGB UR QL STRIP: ABNORMAL
HYALINE CASTS URNS QL MICRO: ABNORMAL /LPF (ref 0–5)
KETONES UR QL STRIP.AUTO: NEGATIVE MG/DL
LEUKOCYTE ESTERASE UR QL STRIP.AUTO: ABNORMAL
MCH RBC QN AUTO: 30 PG (ref 26–34)
MCHC RBC AUTO-ENTMCNC: 32.1 G/DL (ref 30–36.5)
MCV RBC AUTO: 93.5 FL (ref 80–99)
NITRITE UR QL STRIP.AUTO: POSITIVE
NRBC # BLD: 0 K/UL (ref 0–0.01)
NRBC BLD-RTO: 0 PER 100 WBC
PH UR STRIP: 6 (ref 5–8)
PLATELET # BLD AUTO: 201 K/UL (ref 150–400)
PMV BLD AUTO: 10.7 FL (ref 8.9–12.9)
POTASSIUM SERPL-SCNC: 4.2 MMOL/L (ref 3.5–5.1)
PROT UR STRIP-MCNC: 300 MG/DL
RBC # BLD AUTO: 2.93 M/UL (ref 3.8–5.2)
RBC #/AREA URNS HPF: >100 /HPF (ref 0–5)
SERVICE CMNT-IMP: NORMAL
SERVICE CMNT-IMP: NORMAL
SODIUM SERPL-SCNC: 139 MMOL/L (ref 136–145)
SP GR UR REFRACTOMETRY: 1.01
UROBILINOGEN UR QL STRIP.AUTO: 1 EU/DL (ref 0.2–1)
WBC # BLD AUTO: 14.5 K/UL (ref 3.6–11)
WBC URNS QL MICRO: >100 /HPF (ref 0–4)
YEAST URNS QL MICRO: PRESENT

## 2025-03-19 PROCEDURE — 82962 GLUCOSE BLOOD TEST: CPT

## 2025-03-19 PROCEDURE — 2500000003 HC RX 250 WO HCPCS: Performed by: STUDENT IN AN ORGANIZED HEALTH CARE EDUCATION/TRAINING PROGRAM

## 2025-03-19 PROCEDURE — 97116 GAIT TRAINING THERAPY: CPT

## 2025-03-19 PROCEDURE — 36415 COLL VENOUS BLD VENIPUNCTURE: CPT

## 2025-03-19 PROCEDURE — 6360000002 HC RX W HCPCS: Performed by: INTERNAL MEDICINE

## 2025-03-19 PROCEDURE — 87186 SC STD MICRODIL/AGAR DIL: CPT

## 2025-03-19 PROCEDURE — 1100000003 HC PRIVATE W/ TELEMETRY

## 2025-03-19 PROCEDURE — 81001 URINALYSIS AUTO W/SCOPE: CPT

## 2025-03-19 PROCEDURE — 2500000003 HC RX 250 WO HCPCS: Performed by: INTERNAL MEDICINE

## 2025-03-19 PROCEDURE — 85027 COMPLETE CBC AUTOMATED: CPT

## 2025-03-19 PROCEDURE — 51702 INSERT TEMP BLADDER CATH: CPT

## 2025-03-19 PROCEDURE — 87086 URINE CULTURE/COLONY COUNT: CPT

## 2025-03-19 PROCEDURE — 80048 BASIC METABOLIC PNL TOTAL CA: CPT

## 2025-03-19 PROCEDURE — 97535 SELF CARE MNGMENT TRAINING: CPT

## 2025-03-19 PROCEDURE — 87088 URINE BACTERIA CULTURE: CPT

## 2025-03-19 PROCEDURE — 6370000000 HC RX 637 (ALT 250 FOR IP): Performed by: STUDENT IN AN ORGANIZED HEALTH CARE EDUCATION/TRAINING PROGRAM

## 2025-03-19 RX ADMIN — ATORVASTATIN CALCIUM 40 MG: 40 TABLET, FILM COATED ORAL at 08:41

## 2025-03-19 RX ADMIN — WATER 1000 MG: 1 INJECTION INTRAMUSCULAR; INTRAVENOUS; SUBCUTANEOUS at 15:33

## 2025-03-19 RX ADMIN — SODIUM CHLORIDE, PRESERVATIVE FREE 10 ML: 5 INJECTION INTRAVENOUS at 22:41

## 2025-03-19 NOTE — CARE COORDINATION
Transition of Care Plan:    RUR: 17% \"medium risk\"  Prior Level of Functioning: Independent with ADL's   Disposition: Moderate intensity short-term skilled physical therapy up to 5x/week at [facility]  ROBERT: 3/20  If SNF or IPR: Date FOC offered: 3/13  Date FOC received: 3/18  Accepting facility: [facility]  Date authorization started with reference number: 3/19, ref # [id number]  Date authorization received and expires: N/A  Follow up appointments: PCP/Specialists as indicated  DME needed: None  Transportation at discharge: Family vs. BLS  IM/IMM Medicare/ letter given: 2nd IM needed prior to d/c   Is patient a  and connected with VA? No  Caregiver Contact: Jonathon Francis (daughter), [phone]   Discharge Caregiver contacted prior to discharge? To be contacted   Care Conference needed? Not at this time   Barriers to discharge: Clinical improvement, SNF auth    1242 PM: CM initiated auth with a reference # of [id number].     1149 AM: [facility] out to pt daughter. CM to start insurance auth.    0857 AM: Chart reviewed. CM contacted pt daughter to discuss additional SNF choices. Pt daughter provided CM with more choices, CM sent additional SNF referrals.     CONSTANCE Simon  Care Management  [facility]   [phone]

## 2025-03-19 NOTE — PLAN OF CARE
Problem: Discharge Planning  Goal: Discharge to home or other facility with appropriate resources  3/18/2025 2009 by Dhruv Bingham RN  Outcome: Progressing  3/18/2025 0947 by Sun Valladares RN  Outcome: Progressing     Problem: Safety - Adult  Goal: Free from fall injury  3/18/2025 2009 by Dhruv Bingham RN  Outcome: Progressing  3/18/2025 0947 by Sun Valladares RN  Outcome: Progressing     Problem: Respiratory - Adult  Goal: Achieves optimal ventilation and oxygenation  3/18/2025 2009 by Dhruv Bingham RN  Outcome: Progressing  3/18/2025 0947 by Sun Valladares RN  Outcome: Progressing     Problem: Cardiovascular - Adult  Goal: Maintains optimal cardiac output and hemodynamic stability  3/18/2025 2009 by Dhruv Bingham RN  Outcome: Progressing  3/18/2025 0947 by Sun Valladares RN  Outcome: Progressing  Goal: Absence of cardiac dysrhythmias or at baseline  3/18/2025 2009 by Dhruv Bingham RN  Outcome: Progressing  3/18/2025 0947 by Sun Valladares RN  Outcome: Progressing     Problem: Musculoskeletal - Adult  Goal: Return mobility to safest level of function  3/18/2025 2009 by Dhruv Bingham RN  Outcome: Progressing  3/18/2025 0947 by Sun Valladares RN  Outcome: Progressing  Goal: Maintain proper alignment of affected body part  3/18/2025 2009 by Dhruv Bingham RN  Outcome: Progressing  3/18/2025 0947 by Snu Valladares RN  Outcome: Progressing  Goal: Return ADL status to a safe level of function  3/18/2025 2009 by Dhruv Bingham RN  Outcome: Progressing  3/18/2025 0947 by Sun Valladares RN  Outcome: Progressing     Problem: Hematologic - Adult  Goal: Maintains hematologic stability  3/18/2025 2009 by Dhruv Bingham RN  Outcome: Progressing  3/18/2025 0947 by Sun Valladares RN  Outcome: Progressing     Problem: Physical Therapy - Adult  Goal: By Discharge: Performs mobility at highest level of function for planned discharge setting.  See evaluation for individualized

## 2025-03-19 NOTE — PROGRESS NOTES
NAME: Zeenat Rolle        :  1945        MRN:  [id number]         Assessment:    acute on chronic hyperkalemia    CA on CKD    Anemia    Urinary retention    HTN Discussion:         baseline creatinine appears to be 1.2-1.4, cr stable at baseline (1.15 today)             Neri catheter    S/p Lokelma. K 4.2 today    S/p iv iron  Stable for discharge from my standpoint       Subjective:     Chief Complaint:  alert. Denies complaint.   Review of Systems:    Symptom Y/N Comments  Symptom Y/N Comments   Fever/Chills    Chest Pain     Poor Appetite    Edema     Cough    Abdominal Pain     Sputum    Joint Pain     SOB/CANNON    Pruritis/Rash     Nausea/vomit    Tolerating PT/OT     Diarrhea    Tolerating Diet     Constipation    Other       Could not obtain due to:      Objective:     VITALS:   Last 24hrs VS reviewed since prior progress note. Most recent are:  Vitals:    25 0400   BP: (!) 127/55   Pulse: 57   Resp: 18   Temp: 97.9 °F (36.6 °C)   SpO2: 96%       Intake/Output Summary (Last 24 hours) at 3/19/2025 0632  Last data filed at 3/19/2025 0617  Gross per 24 hour   Intake 720 ml   Output 2175 ml   Net -1455 ml      Telemetry Reviewed:     PHYSICAL EXAM:  General: NAD      Lab Data Reviewed: (see below)    Medications Reviewed: (see below)    PMH/SH reviewed - no change compared to H&P  ________________________________________________________________________  Care Plan discussed with:  Patient     Family      RN     Care Manager                    Consultant:          Comments   >50% of visit spent in counseling and coordination of care       ________________________________________________________________________  Amanda Khoury MD     Procedures: see electronic medical records for all procedures/Xrays and details which  were not copied into this note but were reviewed prior to creation of Plan.      LABS:  Recent Labs

## 2025-03-19 NOTE — PLAN OF CARE
Problem: Occupational Therapy - Adult  Goal: By Discharge: Performs self-care activities at highest level of function for planned discharge setting.  See evaluation for individualized goals.  Description: FUNCTIONAL STATUS PRIOR TO ADMISSION:  Patient was ambulatory using RW and states she was Mod Indep with ADLs, living alone, with daughter checking in with her daily.   ,  ,  ,  ,  ,  ,  ,  ,  ,  ,       HOME SUPPORT: Patient lived alone with local daughter checking in with patient on a daily basis.    Occupational Therapy Goals:  Initiated 3/12/2025  Weekly reassessment 3/19/2025. Goals met. Upgrade all goals to modified independent within 7 days.  1.  Patient will perform seated self-feeding with Set-up within 7 day(s).  2.  Patient will perform seated grooming with Set-up within 7 day(s).  3.  Patient will perform seated bathing with Minimal Assist within 7 day(s).  4.  Patient will perform RW toilet transfers with Contact Guard Assist  within 7 day(s).  5.  Patient will perform all aspects of RW toileting with Contact Guard Assist within 7 day(s).    Outcome: Progressing   OCCUPATIONAL THERAPY TREATMENT: WEEKLY REASSESSMENT    Patient: Zeenat Rolle (79 y.o. female)  Date: 3/19/2025  Primary Diagnosis: Bradycardia [R00.1]  Other fatigue [R53.83]  Symptomatic bradycardia [R00.1]  Procedure(s) (LRB):  Insert PPM dual (N/A) 6 Days Post-Op   Precautions: Fall Risk                Chart, occupational therapy assessment, plan of care, and goals were reviewed.    ASSESSMENT  Patient continues to benefit from skilled OT services and is progressing towards goals. Received in bed with alarm activated and agreeable to OT session, cleared by nurse. Overall supervision to achieve EOB then ambulates to bathroom with RW and CGA needing steadying assist when lowering on to toilet. Standing at sink for bathing and grooming with intermittent steadying assist. Patient continues with improving mentation daily, however still  Stand by assistance  Grooming Skilled Clinical Factors: standing at sink    UE Bathing: Stand by assistance  UE Bathing Skilled Clinical Factors: standing at sink    LE Bathing: Contact guard assistance  LE Bathing Skilled Clinical Factors: seated/standing at sink    UE Dressing: Contact guard assistance  UE Dressing Skilled Clinical Factors: gown in standing    Toileting: Contact guard assistance  Toileting Skilled Clinical Factors: seated and standing    Functional Mobility: Contact guard assistance  Functional Mobility Skilled Clinical Factors: RW to/from bathroom     Product Used : Incontinent cleanser    Pain Rating:  No c/o pain    Activity Tolerance:   Good  Please refer to the flowsheet for vital signs taken during this treatment.    After treatment:   Patient left in no apparent distress sitting up in chair, Call bell within reach, Bed/ chair alarm activated, and Updated patient's board on functional status and mobility recommendations    COMMUNICATION/EDUCATION:   The patient's plan of care was discussed with: registered nurse    Patient Education  Education Given To: Patient  Education Provided: Role of Therapy;Plan of Care;Transfer Training;Fall Prevention Strategies  Education Method: Verbal;Demonstration  Education Outcome: Verbalized understanding    Thank you for this referral.  Marie Beltran OT  Minutes: 23

## 2025-03-19 NOTE — PLAN OF CARE
Problem: Discharge Planning  Goal: Discharge to home or other facility with appropriate resources  3/19/2025 0949 by Sun Valladares RN  Outcome: Progressing  3/18/2025 2009 by Dhruv Bingham RN  Outcome: Progressing     Problem: Safety - Adult  Goal: Free from fall injury  3/19/2025 0949 by Sun Valladares RN  Outcome: Progressing  3/18/2025 2009 by Dhruv Bingham RN  Outcome: Progressing     Problem: Respiratory - Adult  Goal: Achieves optimal ventilation and oxygenation  3/19/2025 0949 by Sun Valladares RN  Outcome: Progressing  3/18/2025 2009 by Dhruv Bingham RN  Outcome: Progressing     Problem: Cardiovascular - Adult  Goal: Maintains optimal cardiac output and hemodynamic stability  3/19/2025 0949 by Sun Valladares RN  Outcome: Progressing  3/18/2025 2009 by Dhruv Bingham RN  Outcome: Progressing  Goal: Absence of cardiac dysrhythmias or at baseline  3/19/2025 0949 by Sun Valladares RN  Outcome: Progressing  3/18/2025 2009 by Dhruv Bingham RN  Outcome: Progressing     Problem: Musculoskeletal - Adult  Goal: Return mobility to safest level of function  3/19/2025 0949 by Sun Valladares RN  Outcome: Progressing  3/18/2025 2009 by Dhruv Bingham RN  Outcome: Progressing  Goal: Maintain proper alignment of affected body part  3/19/2025 0949 by Sun Valladares RN  Outcome: Progressing  3/18/2025 2009 by Dhruv Bingham RN  Outcome: Progressing  Goal: Return ADL status to a safe level of function  3/19/2025 0949 by Sun Valladares RN  Outcome: Progressing  3/18/2025 2009 by Dhruv Bingham RN  Outcome: Progressing     Problem: Hematologic - Adult  Goal: Maintains hematologic stability  3/19/2025 0949 by Sun Valladares RN  Outcome: Progressing  3/18/2025 2009 by Dhruv Bingham RN  Outcome: Progressing     Problem: Skin/Tissue Integrity  Goal: Skin integrity remains intact  Description: 1.  Monitor for areas of redness and/or skin breakdown  2.  Assess vascular access sites  nutritional status  3/19/2025 0949 by Sun Valladares, RN  Outcome: Progressing  3/18/2025 2009 by Dhruv Bingham, RN  Outcome: Progressing

## 2025-03-19 NOTE — PROGRESS NOTES
End of Shift Note    Bedside shift change report given to CRISTÓBAL Garsia (oncoming nurse) by Dhruv Bingham RN (offgoing nurse).  Report included the following information SBAR, Intake/Output, MAR, Recent Results, Cardiac Rhythm Sinus Diogenes, Quality Measures, and Dual Neuro Assessment    Shift worked: 7:00 PM-7:30 AM     Shift summary and any significant changes:    Patient was confused at the start of the shift and attempted to get out of bed on her own. Bed Alarm was on. I assisted the patient to the bathroom twice. Labs were drawn and resulted.    Concerns for physician to address: Bradycardia; Discharge Plan     Zone phone for oncoming shift:  N/A       Activity:  Level of Assistance: Moderate assist, patient does 50-74%  Number times ambulated in hallways past shift: 0  Number of times OOB to chair past shift: 1    Cardiac:   Cardiac Monitoring: Yes      Cardiac Rhythm: Sinus diogenes    Access:  Current line(s): PIV     Genitourinary:   Urinary Status: Neri    Respiratory:   O2 Device: None (Room air)  Chronic home O2 use?: NO  Incentive spirometer at bedside: NO    GI:  Last BM (including prior to admit): 03/18/25  Current diet:  ADULT DIET; Regular; Low Potassium (Less than 3000 mg/day)  ADULT ORAL NUTRITION SUPPLEMENT; Breakfast, Dinner; Renal Oral Supplement  Passing flatus: YES    Pain Management:   Patient states pain is manageable on current regimen: YES    Skin:  Eron Scale Score: 17  Interventions: Wound Offloading (Prevention Methods): Bed, pressure redistribution/air, Bed, pressure reduction mattress, Elevate heels, Pillows, Repositioning, Turning, Walker    Patient Safety:  Fall Risk: Nursing Judgement-Fall Risk High(Add Comments): Yes  Fall Risk Interventions  Nursing Judgement-Fall Risk High(Add Comments): Yes  Toilet Every 2 Hours-In Advance of Need: Yes  Hourly Visual Checks: Awake, In bed  Fall Visual Posted: Armband, Socks  Room Door Open: Yes  Alarm On: Bed  Patient Moved Closer to Nursing  Station: No    Active Consults:   IP CONSULT TO CARDIOLOGY  IP CONSULT TO NEPHROLOGY  IP CONSULT TO UROLOGY  IP CONSULT TO DIETITIAN    Length of Stay:  Expected LOS: 9  Actual LOS: 5    Dhruv Bingham RN

## 2025-03-19 NOTE — PROGRESS NOTES
Hospitalist Progress Note    NAME:   Zeenat Rolle   : 1945   MRN: [id number]     Date/Time: 3/19/2025 1:27 PM  Patient PCP: Lefty Segura MD    Estimated discharge date: 3/21  Barriers: monitoring white count, urine culture, SNF placement    Assessment / Plan:    Acute UTI:  No suspicion for catheter associated UTI. Patient already had increasing white count and urinary retention which is likely symptom of UTI before vasquez was placed. Ideally, urine should have sent before vasquez was placed.  -Start ceftriaxone 1gm iv daily  -will send urine culture.       Symptomatic bradycardia  HTN  HLD  Appreciate cardiology consult  Initially planned PPM- cancelled as heart rate improved with improvement of potassium  Continue with statin, aspirin        CA on CKD  Acute Hyperkalemia  S/p lokelma  improved  Continue with low diet potassium      Acute urinary retention  [place] [facility] will follow with the patient at [place] office if patient discharged home for voiding trial  Keep Vasquez for 7 days  Nephro also cleared for DC      Anemia  Stable    Hypertension  Hyperlipidemia:  Continue aspirin, statin  Blood pressure soft          Medical Decision Making:   I personally reviewed labs:yes, CBC, BMP  I personally reviewed imaging:yes  I personally reviewed EKG:yes  Toxic drug monitoring: Yes, monitor platelet count  Discussed case with: Patient, patient's daughter        Code Status: full code  DVT Prophylaxis: Lovenox  GI Prophylaxis: None indicated    Subjective:     Chief Complaint / Reason for Physician Visit  Patient reports feeling better. States that she is ready to go home.     Objective:     VITALS:   Last 24hrs VS reviewed since prior progress note. Most recent are:  Patient Vitals for the past 24 hrs:   BP Temp Temp src Pulse Resp SpO2 Height   25 1323 (!) 104/50 -- -- (!) 48 -- -- --   25 1321 (!) 91/54 97.5 °F (36.4 °C) -- 55 17 97 % --   25 1134 (!) 100/45 98 °F (36.7 °C) Oral

## 2025-03-19 NOTE — PLAN OF CARE
Problem: Physical Therapy - Adult  Goal: By Discharge: Performs mobility at highest level of function for planned discharge setting.  See evaluation for individualized goals.  Description: FUNCTIONAL STATUS PRIOR TO ADMISSION: Pt poor hx.  Stated she uses device but unclear.  Was using SPC when admitted 8/2024.    HOME SUPPORT PRIOR TO ADMISSION: The patient lived alone with DTR to provide assistance.    Physical Therapy Goals  Initiated 3/12/2025  1.  Patient will move from supine to sit and sit to supine in bed with supervision/set-up within 7 day(s).    2.  Patient will perform sit to stand with supervision/set-up within 7 day(s).  3.  Patient will transfer from bed to chair and chair to bed with supervision/set-up using the least restrictive device within 7 day(s).  4.  Patient will ambulate with supervision/set-up for 150 feet with the least restrictive device within 7 day(s).   5.  Patient will ascend/descend 12 stairs with 1 handrail(s) with contact guard assist within 7 day(s).    Outcome: Progressing   PHYSICAL THERAPY TREATMENT    Patient: Zeenat Rolle (79 y.o. female)  Date: 3/19/2025  Diagnosis: Bradycardia [R00.1]  Other fatigue [R53.83]  Symptomatic bradycardia [R00.1] Bradycardia  Procedure(s) (LRB):  Insert PPM dual (N/A) 6 Days Post-Op  Precautions: Restrictions/Precautions  Restrictions/Precautions: Fall Risk            ASSESSMENT:  Patient continues to benefit from skilled PT services and is slowly progressing towards goals. Pt received seated EOB with RN present, agreeable to brief gait only this session. Pt performed sit <> Stands with RW and CGA during session, cues to push up from bed not pull on RW. Pt walked 60ft x2 with RW and CGA, short steps and slow speed. Poor RW management and minor LOB throughout. Pt impulsive, requiring cues for safety throughout session. Pt returned to room, was assisted back to supine in bed per her request, left with all needs met, call bell in reach, bed  alarm on, RN aware. Pt is well below a safe mobility baseline to [place] home alone. Will need moderate intensity rehab at [place]. Continue to follow.         PLAN:  Patient continues to benefit from skilled intervention to address the above impairments.  Continue treatment per established plan of care.        Recommendation for discharge: (in order for the patient to meet his/her long term goals):   Moderate intensity short-term skilled physical therapy up to 5x/week    Other factors to consider for discharge: lives alone, poor safety awareness, impaired cognition, high risk for falls, and concern for safely navigating or managing the home environment    IF patient discharges home will need the following DME: continuing to assess with progress       SUBJECTIVE:   Patient stated, \"I guess we can walk, that's it though.\"    OBJECTIVE DATA SUMMARY:          Functional Mobility Training:  Bed Mobility:  Bed Mobility Training  Supine to Sit: Stand by assistance (bed rail)  Scooting: Stand by assistance  Transfers:  Transfer Training  Transfer Training: Yes  Sit to Stand: Stand by assistance  Stand to Sit: Contact guard assistance (to control lowering)  Bed to Chair: Contact guard assistance (RW)  Toilet Transfer: Contact guard assistance (RW)  Balance:  Balance  Sitting - Static: Good (unsupported)  Sitting - Dynamic: Good (unsupported)  Standing - Static: Occasional;Good  Standing - Dynamic: Constant support;Fair   Ambulation/Gait Training:     Gait  Gait Training: Yes  Overall Level of Assistance: Contact guard assistance;Minimal assistance  Distance (ft): 60 Feet (x2)  Assistive Device: Walker, rolling;Gait belt  Interventions: Verbal cues  Base of Support: Narrowed  Speed/Maame: Slow;Shuffled  Gait Abnormalities: Decreased step clearance;Trunk sway increased         Pain Rating:  Did not report pain this visit   Pain Intervention(s):   rest and repositioning    Activity Tolerance:   Fair  and requires frequent rest

## 2025-03-20 LAB
ANION GAP SERPL CALC-SCNC: 2 MMOL/L (ref 2–12)
BASOPHILS # BLD: 0.06 K/UL (ref 0–0.1)
BASOPHILS NFR BLD: 0.4 % (ref 0–1)
BUN SERPL-MCNC: 27 MG/DL (ref 6–20)
BUN/CREAT SERPL: 25 (ref 12–20)
CALCIUM SERPL-MCNC: 9.1 MG/DL (ref 8.5–10.1)
CHLORIDE SERPL-SCNC: 111 MMOL/L (ref 97–108)
CO2 SERPL-SCNC: 27 MMOL/L (ref 21–32)
CREAT SERPL-MCNC: 1.06 MG/DL (ref 0.55–1.02)
DIFFERENTIAL METHOD BLD: ABNORMAL
EOSINOPHIL # BLD: 0.09 K/UL (ref 0–0.4)
EOSINOPHIL NFR BLD: 0.6 % (ref 0–7)
ERYTHROCYTE [DISTWIDTH] IN BLOOD BY AUTOMATED COUNT: 17.2 % (ref 11.5–14.5)
GLUCOSE BLD STRIP.AUTO-MCNC: 72 MG/DL (ref 65–117)
GLUCOSE SERPL-MCNC: 60 MG/DL (ref 65–100)
HCT VFR BLD AUTO: 28 % (ref 35–47)
HGB BLD-MCNC: 9 G/DL (ref 11.5–16)
IMM GRANULOCYTES # BLD AUTO: 0.09 K/UL (ref 0–0.04)
IMM GRANULOCYTES NFR BLD AUTO: 0.6 % (ref 0–0.5)
LYMPHOCYTES # BLD: 1.86 K/UL (ref 0.8–3.5)
LYMPHOCYTES NFR BLD: 12.6 % (ref 12–49)
MCH RBC QN AUTO: 29.8 PG (ref 26–34)
MCHC RBC AUTO-ENTMCNC: 32.1 G/DL (ref 30–36.5)
MCV RBC AUTO: 92.7 FL (ref 80–99)
MONOCYTES # BLD: 0.79 K/UL (ref 0–1)
MONOCYTES NFR BLD: 5.4 % (ref 5–13)
NEUTS SEG # BLD: 11.83 K/UL (ref 1.8–8)
NEUTS SEG NFR BLD: 80.4 % (ref 32–75)
NRBC # BLD: 0 K/UL (ref 0–0.01)
NRBC BLD-RTO: 0 PER 100 WBC
PLATELET # BLD AUTO: 217 K/UL (ref 150–400)
PMV BLD AUTO: 10.7 FL (ref 8.9–12.9)
POTASSIUM SERPL-SCNC: 4.6 MMOL/L (ref 3.5–5.1)
RBC # BLD AUTO: 3.02 M/UL (ref 3.8–5.2)
SERVICE CMNT-IMP: NORMAL
SODIUM SERPL-SCNC: 140 MMOL/L (ref 136–145)
WBC # BLD AUTO: 14.7 K/UL (ref 3.6–11)

## 2025-03-20 PROCEDURE — 97530 THERAPEUTIC ACTIVITIES: CPT

## 2025-03-20 PROCEDURE — 82962 GLUCOSE BLOOD TEST: CPT

## 2025-03-20 PROCEDURE — 36415 COLL VENOUS BLD VENIPUNCTURE: CPT

## 2025-03-20 PROCEDURE — 2500000003 HC RX 250 WO HCPCS: Performed by: STUDENT IN AN ORGANIZED HEALTH CARE EDUCATION/TRAINING PROGRAM

## 2025-03-20 PROCEDURE — 6370000000 HC RX 637 (ALT 250 FOR IP): Performed by: STUDENT IN AN ORGANIZED HEALTH CARE EDUCATION/TRAINING PROGRAM

## 2025-03-20 PROCEDURE — 80048 BASIC METABOLIC PNL TOTAL CA: CPT

## 2025-03-20 PROCEDURE — 6370000000 HC RX 637 (ALT 250 FOR IP): Performed by: INTERNAL MEDICINE

## 2025-03-20 PROCEDURE — 97535 SELF CARE MNGMENT TRAINING: CPT

## 2025-03-20 PROCEDURE — 6360000002 HC RX W HCPCS: Performed by: INTERNAL MEDICINE

## 2025-03-20 PROCEDURE — 2500000003 HC RX 250 WO HCPCS: Performed by: INTERNAL MEDICINE

## 2025-03-20 PROCEDURE — 85025 COMPLETE CBC W/AUTO DIFF WBC: CPT

## 2025-03-20 PROCEDURE — 1100000003 HC PRIVATE W/ TELEMETRY

## 2025-03-20 RX ADMIN — ASPIRIN 81 MG: 81 TABLET, COATED ORAL at 09:49

## 2025-03-20 RX ADMIN — WATER 1000 MG: 1 INJECTION INTRAMUSCULAR; INTRAVENOUS; SUBCUTANEOUS at 13:35

## 2025-03-20 RX ADMIN — ATORVASTATIN CALCIUM 40 MG: 40 TABLET, FILM COATED ORAL at 09:49

## 2025-03-20 RX ADMIN — SODIUM CHLORIDE, PRESERVATIVE FREE 10 ML: 5 INJECTION INTRAVENOUS at 22:30

## 2025-03-20 RX ADMIN — SODIUM CHLORIDE, PRESERVATIVE FREE 10 ML: 5 INJECTION INTRAVENOUS at 09:50

## 2025-03-20 ASSESSMENT — PAIN SCALES - GENERAL
PAINLEVEL_OUTOF10: 0

## 2025-03-20 NOTE — PROGRESS NOTES
Physical Therapy  Chart reviewed and attempting to see patient for PT this pm. Upon arrival, patient soundly sleeping. Nurse reporting that patient had been up all morning and recently returned to bed.  Will defer therapy at this time and continue to follow.  Continue to recommend rehab following discharge.  Thank you.,  [place] Escamilla, PT

## 2025-03-20 NOTE — PLAN OF CARE
Problem: Discharge Planning  Goal: Discharge to home or other facility with appropriate resources  3/19/2025 2134 by Luly Clinton RN  Outcome: Progressing  3/19/2025 0949 by Sun Valladares RN  Outcome: Progressing     Problem: Safety - Adult  Goal: Free from fall injury  3/19/2025 2134 by Luly Clinton RN  Outcome: Progressing  3/19/2025 0949 by Sun Valladares RN  Outcome: Progressing     Problem: Respiratory - Adult  Goal: Achieves optimal ventilation and oxygenation  3/19/2025 2134 by Luly Clinton RN  Outcome: Progressing  3/19/2025 0949 by Sun Valladares RN  Outcome: Progressing     Problem: Cardiovascular - Adult  Goal: Maintains optimal cardiac output and hemodynamic stability  3/19/2025 2134 by Luly Clinton RN  Outcome: Progressing  3/19/2025 0949 by Sun Valladares RN  Outcome: Progressing  Goal: Absence of cardiac dysrhythmias or at baseline  3/19/2025 2134 by Luly Clinton RN  Outcome: Progressing  3/19/2025 0949 by Sun Valladares RN  Outcome: Progressing     Problem: Musculoskeletal - Adult  Goal: Return mobility to safest level of function  3/19/2025 2134 by Luly Clinton RN  Outcome: Progressing  3/19/2025 0949 by Sun Valladares RN  Outcome: Progressing  Goal: Maintain proper alignment of affected body part  3/19/2025 2134 by Luly Clinton RN  Outcome: Progressing  3/19/2025 0949 by Sun Valladares RN  Outcome: Progressing  Goal: Return ADL status to a safe level of function  3/19/2025 2134 by Luly Clinton RN  Outcome: Progressing  3/19/2025 0949 by Sun Valladares RN  Outcome: Progressing     Problem: Hematologic - Adult  Goal: Maintains hematologic stability  3/19/2025 2134 by Luly Clinton RN  Outcome: Progressing  3/19/2025 0949 by Sun Valladares RN  Outcome: Progressing     Problem: Physical Therapy - Adult  Goal: By Discharge: Performs mobility at highest level of function for planned discharge setting.  See evaluation for individualized goals.  Description: FUNCTIONAL

## 2025-03-20 NOTE — PROGRESS NOTES
End of Shift Note    Bedside shift change report given to CRISTÓBAL Mcintyre (oncoming nurse) by Luigi Davison RN (offgoing nurse).  Report included the following information SBAR and Quality Measures    Shift worked:  7am-7pm     Shift summary and any significant changes:     Patient came into the [place] today.upon assessment patient was oriented to place , time , and person. She was generally calm throughout the entire shift.patient care still ongoing     Concerns for physician to address:  none     Zone phone for oncoming shift:   [phone]         Activity:  Level of Assistance: Moderate assist, patient does 50-74%  Number times ambulated in hallways past shift: 0  Number of times OOB to chair past shift: 2    Cardiac:   Cardiac Monitoring: Yes      Cardiac Rhythm: Sinus brent    Access:  Current line(s): PIV    Genitourinary:   Urinary Status: Neri    Respiratory:   O2 Device: None (Room air)  Chronic home O2 use?: NO  Incentive spirometer at bedside: NO    GI:  Last BM (including prior to admit): 03/19/25  Current diet:  ADULT DIET; Regular; Low Potassium (Less than 3000 mg/day)  ADULT ORAL NUTRITION SUPPLEMENT; Breakfast, Dinner; Renal Oral Supplement  Passing flatus: YES    Pain Management:   Patient states pain is manageable on current regimen: YES    Skin:  Eron Scale Score: 17  Interventions: Wound Offloading (Prevention Methods): Bed, pressure redistribution/air, Bed, pressure reduction mattress, Elevate heels, Pillows, Repositioning, Turning, Walker    Patient Safety:  Fall Risk: Nursing Judgement-Fall Risk High(Add Comments): Yes  Fall Risk Interventions  Nursing Judgement-Fall Risk High(Add Comments): Yes  Toilet Every 2 Hours-In Advance of Need: Yes  Hourly Visual Checks: Awake, In chair  Fall Visual Posted: Armband, Socks  Room Door Open: Yes  Alarm On: Chair, Bed  Patient Moved Closer to Nursing Station: No    Active Consults:   IP CONSULT TO CARDIOLOGY  IP CONSULT TO NEPHROLOGY  IP CONSULT TO UROLOGY  IP

## 2025-03-20 NOTE — PROGRESS NOTES
End of Shift Note    Bedside shift change report given to GISELE Quiroz (oncoming nurse) by Luly Clinton RN (offgoing nurse).  Report included the following information SBAR, Kardex, Intake/Output, MAR, Cardiac Rhythm sinus brent, and Quality Measures    Shift worked:  9901-2866     Shift summary and any significant changes:     Patient had uneventful night.AxO3 , on room air,not in distress, denies any pain and intact vasquez catheter to urine bag.Call light within reached,bed alarm on.Plan of care ongoing.     Concerns for physician to address:       Zone phone for oncoming shift:          Activity:  Level of Assistance: Independent  Number times ambulated in hallways past shift: 0  Number of times OOB to chair past shift: 1    Cardiac:   Cardiac Monitoring: Yes      Cardiac Rhythm: Sinus brent    Access:  Current line(s): PIV     Genitourinary:   Urinary Status: Vasquez    Respiratory:   O2 Device: None (Room air)  Chronic home O2 use?: NO  Incentive spirometer at bedside: NO    GI:  Last BM (including prior to admit): 03/19/25  Current diet:  ADULT DIET; Regular; Low Potassium (Less than 3000 mg/day)  ADULT ORAL NUTRITION SUPPLEMENT; Breakfast, Dinner; Renal Oral Supplement  Passing flatus: YES    Pain Management:   Patient states pain is manageable on current regimen: YES    Skin:  Eron Scale Score: 22  Interventions: Wound Offloading (Prevention Methods): Pillows    Patient Safety:  Fall Risk: Nursing Judgement-Fall Risk High(Add Comments): No  Fall Risk Interventions  Nursing Judgement-Fall Risk High(Add Comments): No  Toilet Every 2 Hours-In Advance of Need: Yes  Hourly Visual Checks: Awake, In bed  Fall Visual Posted: Armband, Socks  Room Door Open: Yes  Alarm On: Chair, Bed  Patient Moved Closer to Nursing Station: No    Active Consults:   IP CONSULT TO CARDIOLOGY  IP CONSULT TO NEPHROLOGY  IP CONSULT TO UROLOGY  IP CONSULT TO DIETITIAN    Length of Stay:  Expected LOS: 9  Actual LOS: 6    Luly Clinton

## 2025-03-20 NOTE — PLAN OF CARE
Problem: Occupational Therapy - Adult  Goal: By Discharge: Performs self-care activities at highest level of function for planned discharge setting.  See evaluation for individualized goals.  Description: FUNCTIONAL STATUS PRIOR TO ADMISSION:  Patient was ambulatory using RW and states she was Mod Indep with ADLs, living alone, with daughter checking in with her daily.   ,  ,  ,  ,  ,  ,  ,  ,  ,  ,       HOME SUPPORT: Patient lived alone with local daughter checking in with patient on a daily basis.    Occupational Therapy Goals:  Initiated 3/12/2025  Weekly reassessment 3/19/2025. Goals met. Upgrade all goals to modified independent within 7 days.  1.  Patient will perform seated self-feeding with Set-up within 7 day(s).  2.  Patient will perform seated grooming with Set-up within 7 day(s).  3.  Patient will perform seated bathing with Minimal Assist within 7 day(s).  4.  Patient will perform RW toilet transfers with Contact Guard Assist  within 7 day(s).  5.  Patient will perform all aspects of RW toileting with Contact Guard Assist within 7 day(s).      Outcome: Progressing    OCCUPATIONAL THERAPY TREATMENT  Patient: Zeenat Rolle (79 y.o. female)  Date: 3/20/2025  Primary Diagnosis: Bradycardia [R00.1]  Other fatigue [R53.83]  Symptomatic bradycardia [R00.1]  Procedure(s) (LRB):  Insert PPM dual (N/A) 7 Days Post-Op   Precautions: Fall Risk                Chart, occupational therapy assessment, plan of care, and goals were reviewed.    ASSESSMENT  Patient continues to benefit from skilled OT services and is progressing towards goals. Pt received standing in bathroom completing grooming tasks (SBA, at some time CGA for increased dynamic activity), pt agreeable to therapy services. Pt demonstrates improving balance and activity tolerance as pt progresses w/ therapy, but still below her normal mod I baseline for ADL completion/mobility, and pt requires verbal cues for safety awareness. Overall, pt completed

## 2025-03-20 NOTE — PROGRESS NOTES
Hospitalist Progress Note    NAME:   Zeenat Rolle   : 1945   MRN: [id number]     Date/Time: 3/20/2025 6:22 PM  Patient PCP: Lefty Segura MD    Estimated discharge date: 3/21  Barriers: monitoring white count, urine culture, SNF placement    Assessment / Plan:    Acute UTI  No suspicion for catheter associated UTI. Patient already had increasing white count and urinary retention which is likely symptom of UTI before vasquez was placed. Ideally, urine should have sent before vasquez was placed.  -Ucx growing GNRs, cont' ceftriaxone 1gm iv daily    Symptomatic bradycardia  HTN  HLD  Appreciate cardiology consult  Initially planned PPM- cancelled as heart rate improved with improvement of potassium  Continue with statin, aspirin    CA on CKD  Acute Hyperkalemia  S/p lokelma. K wnl today  Continue with low diet potassium    Acute urinary retention  [facility] will follow with the patient at [place] office if patient discharged home for voiding trial  Keep Vasquez for 7 days  Nephro also cleared for DC    Anemia  Stable    Hypertension  Hyperlipidemia  Continue aspirin, statin  Blood pressure soft          Medical Decision Making:   I personally reviewed labs  I personally reviewed imaging  I personally reviewed EKG  Toxic drug monitoring  Discussed case with        Code Status: full code  DVT Prophylaxis: Lovenox  GI Prophylaxis: None indicated    Subjective:   No new complaint.  Eager to be discharge. pt is aware that ucx remains pending.     Objective:     VITALS:   Last 24hrs VS reviewed since prior progress note. Most recent are:  Patient Vitals for the past 24 hrs:   BP Temp Temp src Pulse Resp SpO2 Weight   25 1624 (!) 116/56 -- -- 55 16 96 % --   25 1559 (!) 89/49 97.4 °F (36.3 °C) Oral 50 16 98 % --   25 0747 (!) 127/49 -- -- 56 -- 100 % --   25 0730 (!) 101/54 97.3 °F (36.3 °C) Oral 62 16 -- --   25 0553 -- -- -- -- -- -- 56.1 kg (123 lb 10.9 oz)   25 0310

## 2025-03-20 NOTE — PROGRESS NOTES
NAME: Zeenat Rolle        :  1945        MRN:  [id number]         Assessment:    acute on chronic hyperkalemia    CA on CKD    Anemia    Urinary retention    HTN Discussion:         baseline creatinine appears to be 1.2-1.4, cr stable at baseline (1.1 today)             Neri catheter    S/p Lokelma. K ok    S/p iv iron  Stable for discharge from my standpoint  I will sign off, outpt f/u after discharge       Subjective:     Chief Complaint:  alert. Denies complaint.   Review of Systems:    Symptom Y/N Comments  Symptom Y/N Comments   Fever/Chills    Chest Pain     Poor Appetite    Edema     Cough    Abdominal Pain     Sputum    Joint Pain     SOB/CANNON    Pruritis/Rash     Nausea/vomit    Tolerating PT/OT     Diarrhea    Tolerating Diet     Constipation    Other       Could not obtain due to:      Objective:     VITALS:   Last 24hrs VS reviewed since prior progress note. Most recent are:  Vitals:    25 0747   BP: (!) 127/49   Pulse: 56   Resp:    Temp:    SpO2: 100%       Intake/Output Summary (Last 24 hours) at 3/20/2025 1330  Last data filed at 3/20/2025 0712  Gross per 24 hour   Intake 480 ml   Output 1400 ml   Net -920 ml      Telemetry Reviewed:     PHYSICAL EXAM:  General: NAD      Lab Data Reviewed: (see below)    Medications Reviewed: (see below)    PMH/SH reviewed - no change compared to H&P  ________________________________________________________________________  Care Plan discussed with:  Patient     Family      RN     Care Manager                    Consultant:          Comments   >50% of visit spent in counseling and coordination of care       ________________________________________________________________________  Amanda Khoury MD     Procedures: see electronic medical records for all procedures/Xrays and details which  were not copied into this note but were reviewed prior to creation of Plan.      LABS:  Recent  polyethylene glycol (GLYCOLAX) packet 17 g  17 g Oral Daily PRN    acetaminophen (TYLENOL) tablet 650 mg  650 mg Oral Q6H PRN    Or    acetaminophen (TYLENOL) suppository 650 mg  650 mg Rectal Q6H PRN    melatonin tablet 3 mg  3 mg Oral Nightly PRN

## 2025-03-20 NOTE — CARE COORDINATION
Chart reviewed. CM acknowledged pt transferred from IVCU to Med Tele. CM provided handoff to unit CM.    Sara Romano MSW  Care Management  [facility]

## 2025-03-20 NOTE — PROGRESS NOTES
Spiritual Health History and Assessment/Progress Note  [facility]    Initial Encounter,  ,  ,      Name: Zeenat Rolle MRN: [id number]    Age: 79 y.o.     Sex: female   Language: English   Baptist: Yazdanism   Bradycardia     Date: 3/20/2025            Total Time Calculated: 18 min              Spiritual Assessment began in [facility] 3 MED TELE        Referral/Consult From: Rounding   Encounter Overview/Reason: Initial Encounter  Service Provided For: Patient    Susan, Belief, Meaning:   Patient identifies as spiritual, is connected with a susan tradition or spiritual practice, and has beliefs or practices that help with coping during difficult times  Family/Friends No family/friends present      Importance and Influence:  Patient has no beliefs influential to healthcare decision-making identified during this visit  Family/Friends No family/friends present    Community:  Patient is connected with a spiritual community and feels well-supported. Support system includes: Children, [place] Community, and Extended family Her  visited earlier today  Family/Friends No family/friends present    Assessment and Plan of Care:     Patient Interventions include: Facilitated expression of thoughts and feelings, Explored spiritual coping/struggle/distress, and Affirmed coping skills/support systems  Family/Friends Interventions include: No family/friends present    Patient Plan of Care: Spiritual Care available upon further referral  Family/Friends Plan of Care: No family/friends present    Electronically signed by Chaplain VINICIO INFANTE on 3/20/2025 at 2:27 PM

## 2025-03-21 LAB
ANION GAP SERPL CALC-SCNC: 5 MMOL/L (ref 2–12)
BACTERIA SPEC CULT: ABNORMAL
BASOPHILS # BLD: 0.02 K/UL (ref 0–0.1)
BASOPHILS NFR BLD: 0.2 % (ref 0–1)
BUN SERPL-MCNC: 34 MG/DL (ref 6–20)
BUN/CREAT SERPL: 32 (ref 12–20)
CALCIUM SERPL-MCNC: 8.8 MG/DL (ref 8.5–10.1)
CC UR VC: ABNORMAL
CHLORIDE SERPL-SCNC: 112 MMOL/L (ref 97–108)
CO2 SERPL-SCNC: 26 MMOL/L (ref 21–32)
CREAT SERPL-MCNC: 1.06 MG/DL (ref 0.55–1.02)
DIFFERENTIAL METHOD BLD: ABNORMAL
EOSINOPHIL # BLD: 0.1 K/UL (ref 0–0.4)
EOSINOPHIL NFR BLD: 1.2 % (ref 0–7)
ERYTHROCYTE [DISTWIDTH] IN BLOOD BY AUTOMATED COUNT: 17.2 % (ref 11.5–14.5)
GLUCOSE BLD STRIP.AUTO-MCNC: 137 MG/DL (ref 65–117)
GLUCOSE BLD STRIP.AUTO-MCNC: 139 MG/DL (ref 65–117)
GLUCOSE BLD STRIP.AUTO-MCNC: 77 MG/DL (ref 65–117)
GLUCOSE SERPL-MCNC: 64 MG/DL (ref 65–100)
HCT VFR BLD AUTO: 26.6 % (ref 35–47)
HGB BLD-MCNC: 8.5 G/DL (ref 11.5–16)
IMM GRANULOCYTES # BLD AUTO: 0.04 K/UL (ref 0–0.04)
IMM GRANULOCYTES NFR BLD AUTO: 0.5 % (ref 0–0.5)
LYMPHOCYTES # BLD: 1.76 K/UL (ref 0.8–3.5)
LYMPHOCYTES NFR BLD: 21.5 % (ref 12–49)
MCH RBC QN AUTO: 30.2 PG (ref 26–34)
MCHC RBC AUTO-ENTMCNC: 32 G/DL (ref 30–36.5)
MCV RBC AUTO: 94.7 FL (ref 80–99)
MONOCYTES # BLD: 0.6 K/UL (ref 0–1)
MONOCYTES NFR BLD: 7.3 % (ref 5–13)
NEUTS SEG # BLD: 5.65 K/UL (ref 1.8–8)
NEUTS SEG NFR BLD: 69.3 % (ref 32–75)
NRBC # BLD: 0 K/UL (ref 0–0.01)
NRBC BLD-RTO: 0 PER 100 WBC
PLATELET # BLD AUTO: 249 K/UL (ref 150–400)
PMV BLD AUTO: 11.5 FL (ref 8.9–12.9)
POTASSIUM SERPL-SCNC: 4.1 MMOL/L (ref 3.5–5.1)
RBC # BLD AUTO: 2.81 M/UL (ref 3.8–5.2)
SERVICE CMNT-IMP: ABNORMAL
SERVICE CMNT-IMP: NORMAL
SODIUM SERPL-SCNC: 143 MMOL/L (ref 136–145)
WBC # BLD AUTO: 8.2 K/UL (ref 3.6–11)

## 2025-03-21 PROCEDURE — 6370000000 HC RX 637 (ALT 250 FOR IP): Performed by: STUDENT IN AN ORGANIZED HEALTH CARE EDUCATION/TRAINING PROGRAM

## 2025-03-21 PROCEDURE — 2500000003 HC RX 250 WO HCPCS: Performed by: STUDENT IN AN ORGANIZED HEALTH CARE EDUCATION/TRAINING PROGRAM

## 2025-03-21 PROCEDURE — 80048 BASIC METABOLIC PNL TOTAL CA: CPT

## 2025-03-21 PROCEDURE — 51702 INSERT TEMP BLADDER CATH: CPT

## 2025-03-21 PROCEDURE — 6360000002 HC RX W HCPCS: Performed by: INTERNAL MEDICINE

## 2025-03-21 PROCEDURE — 85025 COMPLETE CBC W/AUTO DIFF WBC: CPT

## 2025-03-21 PROCEDURE — 2500000003 HC RX 250 WO HCPCS: Performed by: INTERNAL MEDICINE

## 2025-03-21 PROCEDURE — 1100000003 HC PRIVATE W/ TELEMETRY

## 2025-03-21 PROCEDURE — 97530 THERAPEUTIC ACTIVITIES: CPT | Performed by: OCCUPATIONAL THERAPIST

## 2025-03-21 PROCEDURE — 82962 GLUCOSE BLOOD TEST: CPT

## 2025-03-21 PROCEDURE — 97116 GAIT TRAINING THERAPY: CPT | Performed by: PHYSICAL THERAPIST

## 2025-03-21 PROCEDURE — 36415 COLL VENOUS BLD VENIPUNCTURE: CPT

## 2025-03-21 PROCEDURE — 6370000000 HC RX 637 (ALT 250 FOR IP): Performed by: INTERNAL MEDICINE

## 2025-03-21 PROCEDURE — 97535 SELF CARE MNGMENT TRAINING: CPT | Performed by: OCCUPATIONAL THERAPIST

## 2025-03-21 RX ADMIN — ATORVASTATIN CALCIUM 40 MG: 40 TABLET, FILM COATED ORAL at 09:51

## 2025-03-21 RX ADMIN — WATER 1000 MG: 1 INJECTION INTRAMUSCULAR; INTRAVENOUS; SUBCUTANEOUS at 14:27

## 2025-03-21 RX ADMIN — SODIUM CHLORIDE, PRESERVATIVE FREE 10 ML: 5 INJECTION INTRAVENOUS at 14:30

## 2025-03-21 RX ADMIN — SODIUM CHLORIDE, PRESERVATIVE FREE 10 ML: 5 INJECTION INTRAVENOUS at 21:18

## 2025-03-21 RX ADMIN — ASPIRIN 81 MG: 81 TABLET, COATED ORAL at 09:51

## 2025-03-21 RX ADMIN — ENOXAPARIN SODIUM 30 MG: 100 INJECTION SUBCUTANEOUS at 09:50

## 2025-03-21 RX ADMIN — SODIUM CHLORIDE, PRESERVATIVE FREE 10 ML: 5 INJECTION INTRAVENOUS at 09:49

## 2025-03-21 ASSESSMENT — PAIN SCALES - GENERAL
PAINLEVEL_OUTOF10: 0

## 2025-03-21 NOTE — CARE COORDINATION
Transition of Care Plan:     RUR: 17% \"medium risk\"  Prior Level of Functioning: Independent with ADL's   Disposition: Moderate intensity short-term skilled physical therapy up to 5x/week at [facility]  ROBERT: 3/20  If SNF or IPR: Date FOC offered: 3/13  Date FOC received: 3/18  Accepting facility:   [facility]  Date authorization started with reference number: 3/19, ref # [id number]  Date authorization received and expires: N/A  Follow up appointments: PCP/Specialists as indicated  DME needed: None  Transportation at discharge: Family vs. BLS  IM/IMM Medicare/ letter given: 2nd IM needed prior to d/c   Is patient a [place] and connected with [facility]? No  Caregiver Contact: Jonathon Francis (daughter), [phone]   Discharge Caregiver contacted prior to discharge? To be contacted   Care Conference needed? Not at this time   Barriers to discharge: Clinical improvement    Initial Note: Chart Reviewed: Pt pending clinical improvement for d/c. Pts insurance auth has been approved. CM will continue to follow.

## 2025-03-21 NOTE — PLAN OF CARE
Problem: Occupational Therapy - Adult  Goal: By Discharge: Performs self-care activities at highest level of function for planned discharge setting.  See evaluation for individualized goals.  Description: FUNCTIONAL STATUS PRIOR TO ADMISSION:  Patient was ambulatory using RW and states she was Mod Indep with ADLs, living alone, with daughter checking in with her daily.   ,  ,  ,  ,  ,  ,  ,  ,  ,  ,       HOME SUPPORT: Patient lived alone with local daughter checking in with patient on a daily basis.    Occupational Therapy Goals:  Initiated 3/12/2025  Weekly reassessment 3/19/2025. Goals met. Upgrade all goals to modified independent within 7 days.  1.  Patient will perform seated self-feeding with Set-up within 7 day(s).  2.  Patient will perform seated grooming with Set-up within 7 day(s).  3.  Patient will perform seated bathing with Minimal Assist within 7 day(s).  4.  Patient will perform RW toilet transfers with Contact Guard Assist  within 7 day(s).  5.  Patient will perform all aspects of RW toileting with Contact Guard Assist within 7 day(s).      Outcome: Progressing    OCCUPATIONAL THERAPY TREATMENT  Patient: Zeenat Rolle (79 y.o. female)  Date: 3/21/2025  Primary Diagnosis: Bradycardia [R00.1]  Other fatigue [R53.83]  Symptomatic bradycardia [R00.1]  Procedure(s) (LRB):  Insert PPM dual (N/A) 8 Days Post-Op   Precautions: Fall Risk                Chart, occupational therapy assessment, plan of care, and goals were reviewed.    ASSESSMENT  Patient continues to benefit from skilled OT services and is progressing towards goals. Patient motivated and with good activity tolerance, ambulated in room, to bathroom and hallway with rolling walker this date and overall CGA. Demonstrated good safety awareness however noted one time to walk very close to object in alatorre on right, reported this was done to make sure there was room for others and noted to walk into wall on right versus door when cued we were at  Factors: seated       Toileting: Contact guard assistance;Adaptive equipment  Toileting Skilled Clinical Factors: educated on safety and use of bedside commode over toilet for increased support         Functional Mobility: Contact guard assistance;Adaptive equipment  Functional Mobility Skilled Clinical Factors: ambulated in room and hallway with rolling walker     Educated on fall prevention related to:  -footwear and benefit of shoes around heel, avoid slides/flip-flops, loose crocs  -use of protective undergarment at night and intentional toileting during the day to prevent rushing to the bathroom  - Educated on slow to rise transfers with transition sup to standing  - safety awareness if feeling dizzy/lightheaded, benefit of ankle pumps, modification of task               Pain Ratin/10   Pain Intervention(s):   pain is at a level acceptable to the patient      Activity Tolerance:   Good  Please refer to the flowsheet for vital signs taken during this treatment.    After treatment:   Patient left in no apparent distress sitting up in chair, Call bell within reach, and Caregiver / family present    COMMUNICATION/EDUCATION:   The patient's plan of care was discussed with: physical therapist and registered nurse    Patient Education  Education Given To: Patient;Family  Education Provided: Role of Therapy;Plan of Care;Transfer Training;Fall Prevention Strategies;ADL Adaptive Strategies  Education Method: Demonstration;Verbal  Barriers to Learning: None  Education Outcome: Verbalized understanding;Continued education needed    Thank you for this referral.  Edda Grant OTR/L  Minutes: 29

## 2025-03-21 NOTE — PROGRESS NOTES
End of Shift Note    Bedside shift change report given to CRISTÓBAL Pop (oncoming nurse) by Luigi Davison RN (offgoing nurse).  Report included the following information SBAR, Intake/Output, and Quality Measures    Shift worked:  7am-7pm     Shift summary and any significant changes:     Patient was calm throughout the entire shift, she took her medications whole ,patient ambulated down the hallway today with walker and daughter behind her patient was also oriented times 4.patient care still ongoing.     Concerns for physician to address:  none     Zone phone for oncoming shift:   [phone]       Activity:  Level of Assistance: Minimal assist, patient does 75% or more  Number times ambulated in hallways past shift: 2  Number of times OOB to chair past shift: 2    Cardiac:   Cardiac Monitoring: Yes      Cardiac Rhythm: Sinus rhythm    Access:  Current line(s): PIV    Genitourinary:   Urinary Status: Neri, Voiding    Respiratory:   O2 Device: None (Room air)  Chronic home O2 use?: NO  Incentive spirometer at bedside: NO    GI:  Last BM (including prior to admit): 03/19/25  Current diet:  ADULT DIET; Regular; Low Potassium (Less than 3000 mg/day)  ADULT ORAL NUTRITION SUPPLEMENT; Breakfast, Dinner; Renal Oral Supplement  Passing flatus: YES    Pain Management:   Patient states pain is manageable on current regimen: YES    Skin:  Eron Scale Score: 21  Interventions: Wound Offloading (Prevention Methods): Repositioning, Turning    Patient Safety:  Fall Risk: Nursing Judgement-Fall Risk High(Add Comments): Yes  Fall Risk Interventions  Nursing Judgement-Fall Risk High(Add Comments): Yes  Toilet Every 2 Hours-In Advance of Need: Yes  Hourly Visual Checks: In chair  Fall Visual Posted: Armband, Socks  Room Door Open: Deferred to promote rest  Alarm On: Bed, Chair  Patient Moved Closer to Nursing Station: No    Active Consults:   IP CONSULT TO CARDIOLOGY  IP CONSULT TO NEPHROLOGY  IP CONSULT TO UROLOGY  IP CONSULT TO

## 2025-03-21 NOTE — PLAN OF CARE
Problem: Physical Therapy - Adult  Goal: By Discharge: Performs mobility at highest level of function for planned discharge setting.  See evaluation for individualized goals.  Description: FUNCTIONAL STATUS PRIOR TO ADMISSION: Pt poor hx.  Stated she uses device but unclear.  Was using SPC when admitted 8/2024.    HOME SUPPORT PRIOR TO ADMISSION: The patient lived alone with DTR to provide assistance.    Physical Therapy Goals  Initiated 3/12/2025  1.  Patient will move from supine to sit and sit to supine in bed with supervision/set-up within 7 day(s).    2.  Patient will perform sit to stand with supervision/set-up within 7 day(s).  3.  Patient will transfer from bed to chair and chair to bed with supervision/set-up using the least restrictive device within 7 day(s).  4.  Patient will ambulate with supervision/set-up for 150 feet with the least restrictive device within 7 day(s).   5.  Patient will ascend/descend 12 stairs with 1 handrail(s) with contact guard assist within 7 day(s).    Outcome: Progressing   PHYSICAL THERAPY TREATMENT    Patient: Zeenat Rolle (79 y.o. female)  Date: 3/21/2025  Diagnosis: Bradycardia [R00.1]  Other fatigue [R53.83]  Symptomatic bradycardia [R00.1] Bradycardia  Procedure(s) (LRB):  Insert PPM dual (N/A) 8 Days Post-Op  Precautions: Restrictions/Precautions  Restrictions/Precautions: Fall Risk            ASSESSMENT:  Patient continues to benefit from skilled PT services and is progressing towards goals. Patient sitting in sitting upon arrival and agreeable to therapy. Patient demonstrating significant progress with overall mobility and endurance. Patient is able to complete transfers and ambulation using RW for support with SBA. Transitioned to no AD and gait mildly unsteady demonstrating mild path deviations but no overt LOB noted. Patient able to ambulate 250 feet in alatorre without difficulty today.  Recommend short course of rehab following discharge to regain prior level of

## 2025-03-21 NOTE — PLAN OF CARE
Problem: Discharge Planning  Goal: Discharge to home or other facility with appropriate resources  3/21/2025 1433 by Luigi Davison RN  Outcome: Progressing  3/21/2025 0257 by Katy Caballero RN  Outcome: Progressing     Problem: Safety - Adult  Goal: Free from fall injury  3/21/2025 1433 by Luigi Davison RN  Outcome: Progressing  3/21/2025 0257 by Katy Caballero RN  Outcome: Progressing     Problem: Respiratory - Adult  Goal: Achieves optimal ventilation and oxygenation  3/21/2025 1433 by Luigi Davison RN  Outcome: Progressing  3/21/2025 0257 by Katy Caballero RN  Outcome: Progressing     Problem: Cardiovascular - Adult  Goal: Maintains optimal cardiac output and hemodynamic stability  Outcome: Progressing  Goal: Absence of cardiac dysrhythmias or at baseline  3/21/2025 1433 by Luigi Davison RN  Outcome: Progressing  3/21/2025 0257 by Katy Caballero RN  Outcome: Progressing     Problem: Musculoskeletal - Adult  Goal: Return mobility to safest level of function  3/21/2025 1433 by Luigi Davison RN  Outcome: Progressing  3/21/2025 0257 by Katy Caballero RN  Outcome: Progressing  Goal: Maintain proper alignment of affected body part  3/21/2025 1433 by Luigi Davison RN  Outcome: Progressing  3/21/2025 0257 by Katy Caballero RN  Outcome: Progressing  Goal: Return ADL status to a safe level of function  Outcome: Progressing     Problem: Hematologic - Adult  Goal: Maintains hematologic stability  3/21/2025 1433 by Luigi Davison RN  Outcome: Progressing  3/21/2025 0257 by Katy Caballero RN  Outcome: Progressing     Problem: Skin/Tissue Integrity  Goal: Skin integrity remains intact  Description: 1.  Monitor for areas of redness and/or skin breakdown  2.  Assess vascular access sites hourly  3.  Every 4-6 hours minimum:  Change oxygen saturation probe site  4.  Every 4-6 hours:  If on nasal continuous positive airway pressure, respiratory therapy assess nares and determine need for

## 2025-03-21 NOTE — PROGRESS NOTES
Hospitalist Progress Note    NAME:   Zeenat Rolle   : 1945   MRN: [id number]     Date/Time: 3/21/2025 9:14 AM  Patient PCP: Lefty Segura MD    Estimated discharge date: medically stable  Barriers: Acute blood, urine culture, placement      Assessment / Plan:  GNR UTI  Leukocytosis  No suspicion for catheter associated UTI. Patient already had increasing white count and urinary retention which is likely symptom of UTI before vasquez was placed. Ideally, urine should have sent before vasquez was placed.  -Ucx growing GNRs, cont' ceftriaxone 1gm iv daily  3/21: Urine culture growing gram-negative rods.  Continue with ceftriaxone for now.  Follow waiting for final culture.  White count is also trended down to 8.2.     Symptomatic bradycardia  HTN  HLD  Appreciate cardiology consult  Initially planned PPM- cancelled as heart rate improved with improvement of potassium  Continue with statin, aspirin  3/21: Heart rate ranging between 50-94.     CA on CKD  Hyperkalemia  S/p lokelma. K wnl today  Continue with low diet potassium  3/21: BUN/creatinine of 34/1.0 and kidney function at baseline.  Potassium is 4.1.     Urinary retention  [facility] will follow with the patient at [place] office if patient discharged home for voiding trial  Keep Vasquez for 7 days  Nephro also cleared for DC     Anemia  Stable  3/21: Patient got 1 blood transfusion earlier during the admission.  H&H of 8.5/26.6 and stable.  Will check for occult blood test to rule out GI bleed.     Hypertension  Hyperlipidemia  Continue aspirin, statin  Blood pressure soft      Medical Decision Making:   I personally reviewed labs: CBC, BMP, urine culture  I personally reviewed imaging: Chest x-ray  I personally reviewed EKG:  Toxic drug monitoring: H&H while patient is on Lovenox  Discussed case with: Patient, RN, I tried calling patient's daughter over the phone and left a voice message.        Code Status: Full code  DVT Prophylaxis:  Complaint / Reason for Physician Visit  \" Follow-up for gram-negative genesis UTI, symptomatic bradycardia, HTN, HLD, CA on CKD, hyperkalemia, urinary retention, anemia, HTN, HLD.\".  Discussed with RN events overnight.       Objective:     VITALS:   Last 24hrs VS reviewed since prior progress note. Most recent are:  Patient Vitals for the past 24 hrs:   BP Temp Temp src Pulse Resp SpO2   03/21/25 0817 122/63 97.3 °F (36.3 °C) -- 56 -- 100 %   03/21/25 0333 137/78 -- -- (!) 119 -- 99 %   03/20/25 2051 (!) 105/43 -- -- 94 16 96 %   03/20/25 1624 (!) 116/56 -- -- 55 16 96 %   03/20/25 1559 (!) 89/49 97.4 °F (36.3 °C) Oral 50 16 98 %         Intake/Output Summary (Last 24 hours) at 3/21/2025 0914  Last data filed at 3/20/2025 2051  Gross per 24 hour   Intake --   Output 600 ml   Net -600 ml        I had a face to face encounter and independently examined this patient on 3/21/2025, as outlined below:  PHYSICAL EXAM:  General: Alert, frail looking elderly female, cooperative  EENT:  EOMI. Anicteric sclerae.  Resp:  CTA bilaterally, no wheezing or rales.  No accessory muscle use  CV:  Regular  rhythm,  No edema  GI:  Soft, Non distended, Non tender.  +Bowel sounds, Neri catheter in place  Neurologic:  Alert and oriented X 3, normal speech,   Psych:   Good insight. Not anxious nor agitated  Skin:  No rashes.  No jaundice    Reviewed most current lab test results and cultures  YES  Reviewed most current radiology test results   YES  Review and summation of old records today    NO  Reviewed patient's current orders and MAR    YES  PMH/SH reviewed - no change compared to H&P    Procedures: see electronic medical records for all procedures/Xrays and details which were not copied into this note but were reviewed prior to creation of Plan.      LABS:  I reviewed today's most current labs and imaging studies.  Pertinent labs include:  Recent Labs     03/19/25  0309 03/20/25  0324 03/21/25  0512   WBC 14.5* 14.7* 8.2   HGB 8.8* 9.0*

## 2025-03-21 NOTE — PROGRESS NOTES
End of Shift Note    Bedside shift change report given to Jeni (oncoming nurse) by Katy Caballero RN (offgoing nurse).  Report included the following information SBAR, Kardex, and MAR    Shift worked:  7p-7a     Shift summary and any significant changes:     Scheduled medications were given, see MAR.  IV has been flushed and is patent.  Neri care was done.  Patient is up with the assistance of one with a walker.  Patient teaching and routine rounding has been done.     Concerns for physician to address:       Zone phone for oncoming shift:          Activity:  Level of Assistance: Minimal assist, patient does 75% or more  Number times ambulated in hallways past shift: 0  Number of times OOB to chair past shift: 0    Cardiac:   Cardiac Monitoring: Yes      Cardiac Rhythm: Sinus rhythm    Access:  Current line(s): PIV     Genitourinary:   Urinary Status: Neri, Voiding, Patent    Respiratory:   O2 Device: None (Room air)  Chronic home O2 use?: NO  Incentive spirometer at bedside: NO    GI:  Last BM (including prior to admit): 03/19/25  Current diet:  ADULT DIET; Regular; Low Potassium (Less than 3000 mg/day)  ADULT ORAL NUTRITION SUPPLEMENT; Breakfast, Dinner; Renal Oral Supplement  Passing flatus: YES    Pain Management:   Patient states pain is manageable on current regimen: YES    Skin:  Eron Scale Score: 19  Interventions: Wound Offloading (Prevention Methods): Blankets, Pillows, Repositioning, Turning    Patient Safety:  Fall Risk: Nursing Judgement-Fall Risk High(Add Comments): Yes  Fall Risk Interventions  Nursing Judgement-Fall Risk High(Add Comments): Yes  Toilet Every 2 Hours-In Advance of Need: Yes  Hourly Visual Checks: In chair  Fall Visual Posted: Armband, Socks  Room Door Open: Deferred to promote rest  Alarm On: Bed, Chair  Patient Moved Closer to Nursing Station: No    Active Consults:   IP CONSULT TO CARDIOLOGY  IP CONSULT TO NEPHROLOGY  IP CONSULT TO UROLOGY  IP CONSULT TO DIETITIAN    Length

## 2025-03-21 NOTE — PLAN OF CARE
Problem: Discharge Planning  Goal: Discharge to home or other facility with appropriate resources  Outcome: Progressing     Problem: Safety - Adult  Goal: Free from fall injury  Outcome: Progressing     Problem: Respiratory - Adult  Goal: Achieves optimal ventilation and oxygenation  Outcome: Progressing     Problem: Cardiovascular - Adult  Goal: Absence of cardiac dysrhythmias or at baseline  Outcome: Progressing     Problem: Musculoskeletal - Adult  Goal: Return mobility to safest level of function  Outcome: Progressing  Goal: Maintain proper alignment of affected body part  Outcome: Progressing     Problem: Hematologic - Adult  Goal: Maintains hematologic stability  Outcome: Progressing     Problem: Skin/Tissue Integrity  Goal: Skin integrity remains intact  Description: 1.  Monitor for areas of redness and/or skin breakdown  2.  Assess vascular access sites hourly  3.  Every 4-6 hours minimum:  Change oxygen saturation probe site  4.  Every 4-6 hours:  If on nasal continuous positive airway pressure, respiratory therapy assess nares and determine need for appliance change or resting period  Outcome: Progressing     Problem: Neurosensory - Adult  Goal: Achieves stable or improved neurological status  Outcome: Progressing     Problem: Skin/Tissue Integrity - Adult  Goal: Skin integrity remains intact  Description: 1.  Monitor for areas of redness and/or skin breakdown  2.  Assess vascular access sites hourly  3.  Every 4-6 hours minimum:  Change oxygen saturation probe site  4.  Every 4-6 hours:  If on nasal continuous positive airway pressure, respiratory therapy assess nares and determine need for appliance change or resting period  Outcome: Progressing     Problem: Gastrointestinal - Adult  Goal: Maintains adequate nutritional intake  Outcome: Progressing     Problem: Genitourinary - Adult  Goal: Absence of urinary retention  Outcome: Progressing     Problem: Pain  Goal: Verbalizes/displays adequate comfort

## 2025-03-22 VITALS
SYSTOLIC BLOOD PRESSURE: 104 MMHG | DIASTOLIC BLOOD PRESSURE: 53 MMHG | HEIGHT: 63 IN | OXYGEN SATURATION: 98 % | BODY MASS INDEX: 21.91 KG/M2 | RESPIRATION RATE: 16 BRPM | HEART RATE: 55 BPM | WEIGHT: 123.68 LBS | TEMPERATURE: 97.6 F

## 2025-03-22 LAB
ANION GAP SERPL CALC-SCNC: 6 MMOL/L (ref 2–12)
BASOPHILS # BLD: 0.04 K/UL (ref 0–0.1)
BASOPHILS NFR BLD: 0.5 % (ref 0–1)
BUN SERPL-MCNC: 35 MG/DL (ref 6–20)
BUN/CREAT SERPL: 34 (ref 12–20)
CALCIUM SERPL-MCNC: 8.6 MG/DL (ref 8.5–10.1)
CHLORIDE SERPL-SCNC: 112 MMOL/L (ref 97–108)
CO2 SERPL-SCNC: 26 MMOL/L (ref 21–32)
CREAT SERPL-MCNC: 1.02 MG/DL (ref 0.55–1.02)
DIFFERENTIAL METHOD BLD: ABNORMAL
EOSINOPHIL # BLD: 0.11 K/UL (ref 0–0.4)
EOSINOPHIL NFR BLD: 1.5 % (ref 0–7)
ERYTHROCYTE [DISTWIDTH] IN BLOOD BY AUTOMATED COUNT: 16.9 % (ref 11.5–14.5)
GLUCOSE BLD STRIP.AUTO-MCNC: 79 MG/DL (ref 65–117)
GLUCOSE SERPL-MCNC: 65 MG/DL (ref 65–100)
HCT VFR BLD AUTO: 25.7 % (ref 35–47)
HGB BLD-MCNC: 8.1 G/DL (ref 11.5–16)
IMM GRANULOCYTES # BLD AUTO: 0.03 K/UL (ref 0–0.04)
IMM GRANULOCYTES NFR BLD AUTO: 0.4 % (ref 0–0.5)
LYMPHOCYTES # BLD: 2.21 K/UL (ref 0.8–3.5)
LYMPHOCYTES NFR BLD: 30.2 % (ref 12–49)
MCH RBC QN AUTO: 30.1 PG (ref 26–34)
MCHC RBC AUTO-ENTMCNC: 31.5 G/DL (ref 30–36.5)
MCV RBC AUTO: 95.5 FL (ref 80–99)
MONOCYTES # BLD: 0.61 K/UL (ref 0–1)
MONOCYTES NFR BLD: 8.3 % (ref 5–13)
NEUTS SEG # BLD: 4.32 K/UL (ref 1.8–8)
NEUTS SEG NFR BLD: 59.1 % (ref 32–75)
NRBC # BLD: 0 K/UL (ref 0–0.01)
NRBC BLD-RTO: 0 PER 100 WBC
PLATELET # BLD AUTO: 262 K/UL (ref 150–400)
PMV BLD AUTO: 11.5 FL (ref 8.9–12.9)
POTASSIUM SERPL-SCNC: 4.2 MMOL/L (ref 3.5–5.1)
RBC # BLD AUTO: 2.69 M/UL (ref 3.8–5.2)
SERVICE CMNT-IMP: NORMAL
SODIUM SERPL-SCNC: 144 MMOL/L (ref 136–145)
WBC # BLD AUTO: 7.3 K/UL (ref 3.6–11)

## 2025-03-22 PROCEDURE — 80048 BASIC METABOLIC PNL TOTAL CA: CPT

## 2025-03-22 PROCEDURE — 2500000003 HC RX 250 WO HCPCS: Performed by: STUDENT IN AN ORGANIZED HEALTH CARE EDUCATION/TRAINING PROGRAM

## 2025-03-22 PROCEDURE — 6370000000 HC RX 637 (ALT 250 FOR IP): Performed by: INTERNAL MEDICINE

## 2025-03-22 PROCEDURE — 36415 COLL VENOUS BLD VENIPUNCTURE: CPT

## 2025-03-22 PROCEDURE — 85025 COMPLETE CBC W/AUTO DIFF WBC: CPT

## 2025-03-22 PROCEDURE — 82962 GLUCOSE BLOOD TEST: CPT

## 2025-03-22 PROCEDURE — 6370000000 HC RX 637 (ALT 250 FOR IP): Performed by: STUDENT IN AN ORGANIZED HEALTH CARE EDUCATION/TRAINING PROGRAM

## 2025-03-22 RX ORDER — ONDANSETRON 4 MG/1
4 TABLET, ORALLY DISINTEGRATING ORAL EVERY 8 HOURS PRN
Qty: 21 TABLET | Refills: 0 | Status: SHIPPED | OUTPATIENT
Start: 2025-03-22

## 2025-03-22 RX ORDER — NITROFURANTOIN 25; 75 MG/1; MG/1
100 CAPSULE ORAL 2 TIMES DAILY
Qty: 8 CAPSULE | Refills: 0 | Status: SHIPPED | OUTPATIENT
Start: 2025-03-22 | End: 2025-03-26

## 2025-03-22 RX ADMIN — SODIUM CHLORIDE, PRESERVATIVE FREE 10 ML: 5 INJECTION INTRAVENOUS at 10:11

## 2025-03-22 RX ADMIN — ASPIRIN 81 MG: 81 TABLET, COATED ORAL at 10:10

## 2025-03-22 RX ADMIN — ATORVASTATIN CALCIUM 40 MG: 40 TABLET, FILM COATED ORAL at 10:10

## 2025-03-22 NOTE — DISCHARGE SUMMARY
Discharge Summary    Name: Zeenat Rolle  [id number]  YOB: 1945 (Age: 79 y.o.)   Date of Admission: 3/11/2025  Date of Discharge: 3/22/2025  Attending Physician: Trista Cordova MD    Discharge Diagnosis:   Klebsiella UTI  Leukocytosis  Symptomatic bradycardia  HTN  HLD  CA on CKD  Hyperkalemia  Urinary retention  Anemia  Hypertension  Hyperlipidemia        Consultations:  IP CONSULT TO CARDIOLOGY  IP CONSULT TO NEPHROLOGY  IP CONSULT TO UROLOGY  IP CONSULT TO DIETITIAN      Brief Admission History/Reason for Admission Per Jesenia Tong MD: Zeenat Rolle is a 79 y.o.  female with PMHx as listed below presenting to the emergency department accompanied by daughter with complaint of 1 week progressively worsening bilateral lower extremity weakness and generalized fatigability associated with poor p.o. intake secondary to disinterest in food.  Prior to this she reports that she had to URI/pulmonary infection in February treated with antibiotics by her PCP and has had persistent cough since that time.  Denies medication changes.  ROS otherwise negative.  Denies tobacco, alcohol, illicit drugs.  Was noted to be bradycardic to 30s at rest typically in the 50s.  Has been referred to cardiology in the past and underwent Holter monitor testing without suspicion for symptomatic bradycardia at that time.  Patient with chronic anemia followed by [facility] previously requiring transfusions-unclear exact diagnosis leading to anemia.     In the ED, patient afebrile, bradycardic (sinus bradycardia on ECG), normotensive, saturating mid 90s on room air.  CT head negative for acute process.  CXR negative for acute process.  COVID and flu negative.  Labs demonstrate: WBC 6.2, hemoglobin 7.7 (typically 8.5-9.5 range)-MCV 95.4, platelets 198, sodium 139, potassium 5.4, glucose 113, BUN 28, creatinine 1.57 (baseline 1.2), LFTs grossly unremarkable, , high sensor troponin 17,

## 2025-03-22 NOTE — PLAN OF CARE
Problem: Discharge Planning  Goal: Discharge to home or other facility with appropriate resources  3/22/2025 1427 by Christina Clemens RN  Outcome: Progressing  3/22/2025 1353 by Christina Clemens RN  Outcome: Progressing     Problem: Safety - Adult  Goal: Free from fall injury  3/22/2025 1427 by Christina Clemens RN  Outcome: Progressing  3/22/2025 1353 by Christina Clemens RN  Outcome: Progressing     Problem: Respiratory - Adult  Goal: Achieves optimal ventilation and oxygenation  3/22/2025 1427 by Christina Clemens RN  Outcome: Progressing  3/22/2025 1353 by Christina Clemens RN  Outcome: Progressing     Problem: Cardiovascular - Adult  Goal: Maintains optimal cardiac output and hemodynamic stability  3/22/2025 1427 by Christina Clemens RN  Outcome: Progressing  3/22/2025 1353 by Christina Clemens RN  Outcome: Progressing  Goal: Absence of cardiac dysrhythmias or at baseline  3/22/2025 1427 by Christina Clemens RN  Outcome: Progressing  3/22/2025 1353 by Christina Clemens RN  Outcome: Progressing     Problem: Musculoskeletal - Adult  Goal: Return mobility to safest level of function  3/22/2025 1427 by Christina Clemens RN  Outcome: Progressing  3/22/2025 1353 by Christina Clemens RN  Outcome: Progressing  Goal: Maintain proper alignment of affected body part  3/22/2025 1427 by Christina Clemens RN  Outcome: Progressing  3/22/2025 1353 by Christina Clemens RN  Outcome: Progressing  Goal: Return ADL status to a safe level of function  3/22/2025 1427 by Christina Clemens RN  Outcome: Progressing  3/22/2025 1353 by Christina Clemens RN  Outcome: Progressing     Problem: Hematologic - Adult  Goal: Maintains hematologic stability  3/22/2025 1427 by Christina Clemens RN  Outcome: Progressing  3/22/2025 1353 by Christina Clemens RN  Outcome: Progressing     Problem: Neurosensory - Adult  Goal: Achieves stable or improved neurological status  3/22/2025 1427 by Christina Clemens RN  Outcome: Progressing  3/22/2025 1353 by Christina Clemens RN  Outcome:

## 2025-03-22 NOTE — PROGRESS NOTES
End of Shift Note    Bedside shift change report given to Christina (oncoming nurse) by Katy Caballero RN (offgoing nurse).  Report included the following information SBAR, Kardex, and MAR    Shift worked:  7p-7a     Shift summary and any significant changes:     Scheduled medications were given, see MAR.  IV has been flushed and is patent.  Neri care was done.  Patient is up with the assistance of one.  Patient teaching and routine rounding has been done.     Concerns for physician to address:       Zone phone for oncoming shift:          Activity:  Level of Assistance: Minimal assist, patient does 75% or more  Number times ambulated in hallways past shift: 0  Number of times OOB to chair past shift: 0    Cardiac:   Cardiac Monitoring: No      Cardiac Rhythm: Sinus rhythm    Access:  Current line(s): PIV     Genitourinary:   Urinary Status: Neri, Voiding    Respiratory:   O2 Device: None (Room air)  Chronic home O2 use?: NO  Incentive spirometer at bedside: NO    GI:  Last BM (including prior to admit): 03/19/25  Current diet:  ADULT DIET; Regular; Low Potassium (Less than 3000 mg/day)  ADULT ORAL NUTRITION SUPPLEMENT; Breakfast, Dinner; Renal Oral Supplement  Passing flatus: YES    Pain Management:   Patient states pain is manageable on current regimen: YES    Skin:  Eron Scale Score: 21  Interventions: Wound Offloading (Prevention Methods): Blankets, Pillows, Repositioning, Turning    Patient Safety:  Fall Risk: Nursing Judgement-Fall Risk High(Add Comments): Yes  Fall Risk Interventions  Nursing Judgement-Fall Risk High(Add Comments): Yes  Toilet Every 2 Hours-In Advance of Need: Yes  Hourly Visual Checks: In bed  Fall Visual Posted: Armband, Socks  Room Door Open: Deferred to promote rest  Alarm On: Bed, Chair  Patient Moved Closer to Nursing Station: No    Active Consults:   IP CONSULT TO CARDIOLOGY  IP CONSULT TO NEPHROLOGY  IP CONSULT TO UROLOGY  IP CONSULT TO DIETITIAN    Length of Stay:  Expected LOS:

## 2025-03-22 NOTE — DISCHARGE INSTRUCTIONS
Please continue taking Macrobid for 4 more days and stop.  Follow-up with PCP, nephrology, urology and cardiology outpatient.

## 2025-03-22 NOTE — CARE COORDINATION
Transition of Care Plan:     RUR: 17% \"medium risk\"  Prior Level of Functioning: Independent with ADL's   Disposition: Moderate intensity short-term skilled physical therapy up to 5x/week at [facility]  ROBERT: 3/20  If SNF or IPR: Date FOC offered: 3/13  Date FOC received: 3/18  Accepting facility:   [facility]  Room#54A  Report#[phone]  Date authorization started with reference number: 3/19-3/21  Ref # [id number]  Auth #: [id number]   Date authorization received and expires: N/A  Follow up appointments: PCP/Specialists as indicated  DME needed: None  Transportation at discharge: Family vs. BLS  IM/IMM Medicare/ letter given: 2nd IM needed prior to d/c   Is patient a  and connected with VA? No  Caregiver Contact: Jonathon Francis (daughter), [phone]   Discharge Caregiver contacted prior to discharge? To be contacted   Care Conference needed? Not at this time   Barriers to discharge: Clinical improvement        CM acknowledged d/c. Chart reviewed.  Pt will d/c to [facility] for SNF intervention. Family will transport pt @2:00PM. Pt and nurse notified. ?2 IM letter given 3/21. CM will remain accessible if any needs arise prior to discharge.           03/22/25 0847   Services At/After Discharge   Transition of Care Consult (CM Consult) SNF   Services At/After Discharge Skilled Nursing Facility (SNF)   [place] Resource Information Provided? No   Mode of Transport at Discharge Other (see comment)  (Family to transport)   Confirm Follow Up Transport Family   Condition of Participation: Discharge Planning   The Plan for Transition of Care is related to the following treatment goals: Pt will discharge to [facility] for SNF intervention   The Patient and/or Patient Representative was provided with a Choice of Provider? Patient   The Patient and/Or Patient Representative agree with the Discharge Plan? Yes   Freedom of Choice list was provided with basic dialogue that supports the  Failure    Dietary:  []Any diet limitations  []Tube Feedings   []Total Parenteral Management (TPN)    Financial Resources:  []Medicaid Application Completed    []UAI Completed and copy given to pt/family  and copy given to pt/family  []A screening has previously been completed.    []Level II Completed    [x] Private pay individual who will not become   financially eligible for Medicaid within 6 months from admission to a [facility].     [] Individual refused to have screening conducted.     []Medicaid Application Completed    []The screening denied because it was determined individual did not need/did not qualify for nursing facility level of care.  [] Out of state residents seeking direct admission to a VA nursing facility.  [] Individuals who are inpatients of an out of state hospital, or in state or out of state veterans/ hospital and seek direct admission to a VA nursing facility  [] Individuals who are pateints or residents of a state owned/operated facility that is licensed by Department of Behavioral Services (DBHDS) and seek direct admission to VA nursing facility  [] A screening not required for enrollment in Medicaid Hospice services as set out in 12 VAC 30-  [] [facility] ([facility]) staff shall perform screenings of the [facility] clients.    Advanced Care Plan:  []Surrogate Decision Maker of Care  []POA  []Communicated Code Status and copy sent.    Other:            OSVALDO Morrison,CM  [phone]

## 2025-03-22 NOTE — PLAN OF CARE
Problem: Discharge Planning  Goal: Discharge to home or other facility with appropriate resources  3/22/2025 1433 by Christina Clemens RN  Outcome: Adequate for Discharge  3/22/2025 1427 by Christina Clemens RN  Outcome: Progressing  3/22/2025 1353 by Christina Clemens RN  Outcome: Progressing     Problem: Safety - Adult  Goal: Free from fall injury  3/22/2025 1433 by Christina Clemens RN  Outcome: Adequate for Discharge  3/22/2025 1427 by Christina Clemens RN  Outcome: Progressing  3/22/2025 1353 by Christina Clemens RN  Outcome: Progressing     Problem: Respiratory - Adult  Goal: Achieves optimal ventilation and oxygenation  3/22/2025 1433 by Christina Clemens RN  Outcome: Adequate for Discharge  3/22/2025 1427 by Christina Clemens RN  Outcome: Progressing  3/22/2025 1353 by Christina Clemens RN  Outcome: Progressing     Problem: Cardiovascular - Adult  Goal: Maintains optimal cardiac output and hemodynamic stability  3/22/2025 1433 by Christina Clemens RN  Outcome: Adequate for Discharge  3/22/2025 1427 by Christina Clemens RN  Outcome: Progressing  3/22/2025 1353 by Christina Clemens RN  Outcome: Progressing  Goal: Absence of cardiac dysrhythmias or at baseline  3/22/2025 1433 by Christina Clemens RN  Outcome: Adequate for Discharge  3/22/2025 1427 by Christina Clemens RN  Outcome: Progressing  3/22/2025 1353 by Christina Clemens RN  Outcome: Progressing     Problem: Musculoskeletal - Adult  Goal: Return mobility to safest level of function  3/22/2025 1433 by Christina Clemens RN  Outcome: Adequate for Discharge  3/22/2025 1427 by Christina Clemens RN  Outcome: Progressing  3/22/2025 1353 by Christina Clemens RN  Outcome: Progressing  Goal: Maintain proper alignment of affected body part  3/22/2025 1433 by Christina Clemens RN  Outcome: Adequate for Discharge  3/22/2025 1427 by Christina Clemens RN  Outcome: Progressing  3/22/2025 1353 by Christina Clemens RN  Outcome: Progressing  Goal: Return ADL status to a safe level of function  3/22/2025 1433 by  Adequate for Discharge  3/22/2025 1427 by Christina Clemens RN  Outcome: Progressing  3/22/2025 1353 by Christina Clemens RN  Outcome: Progressing     Problem: Skin/Tissue Integrity  Goal: Skin integrity remains intact  Description: 1.  Monitor for areas of redness and/or skin breakdown  2.  Assess vascular access sites hourly  3.  Every 4-6 hours minimum:  Change oxygen saturation probe site  4.  Every 4-6 hours:  If on nasal continuous positive airway pressure, respiratory therapy assess nares and determine need for appliance change or resting period  3/22/2025 1433 by Christina Clemens RN  Outcome: Adequate for Discharge  3/22/2025 1427 by Christina Clemens RN  Outcome: Progressing  3/22/2025 1353 by Christina Clemens RN  Outcome: Progressing     Problem: Pain  Goal: Verbalizes/displays adequate comfort level or baseline comfort level  3/22/2025 1433 by Christina Clemens RN  Outcome: Adequate for Discharge  3/22/2025 1427 by Christina Clemens RN  Outcome: Progressing  3/22/2025 1353 by Christina Clemens RN  Outcome: Progressing     Problem: Nutrition Deficit:  Goal: Optimize nutritional status  3/22/2025 1433 by Christina Clemens RN  Outcome: Adequate for Discharge  3/22/2025 1427 by Christina Clemens RN  Outcome: Progressing  3/22/2025 1353 by Christina Clemens RN  Outcome: Progressing

## 2025-03-22 NOTE — PLAN OF CARE
Problem: Discharge Planning  Goal: Discharge to home or other facility with appropriate resources  3/21/2025 2324 by Katy Caballero RN  Outcome: Progressing  3/21/2025 1433 by Luigi Davison RN  Outcome: Progressing     Problem: Safety - Adult  Goal: Free from fall injury  3/21/2025 2324 by Katy Caballero RN  Outcome: Progressing  3/21/2025 1433 by Luigi Davison RN  Outcome: Progressing     Problem: Respiratory - Adult  Goal: Achieves optimal ventilation and oxygenation  3/21/2025 2324 by Katy Caballero RN  Outcome: Progressing  3/21/2025 1433 by Luigi Davison RN  Outcome: Progressing     Problem: Cardiovascular - Adult  Goal: Maintains optimal cardiac output and hemodynamic stability  3/21/2025 1433 by Luigi Davison RN  Outcome: Progressing  Goal: Absence of cardiac dysrhythmias or at baseline  3/21/2025 1433 by Luigi Davison RN  Outcome: Progressing     Problem: Musculoskeletal - Adult  Goal: Return mobility to safest level of function  3/21/2025 2324 by Katy Caballero RN  Outcome: Progressing  3/21/2025 1433 by Luigi Davison RN  Outcome: Progressing  Goal: Maintain proper alignment of affected body part  3/21/2025 2324 by Katy Caballero RN  Outcome: Progressing  3/21/2025 1433 by Luigi Davison RN  Outcome: Progressing  Goal: Return ADL status to a safe level of function  3/21/2025 2324 by Katy Caballero RN  Outcome: Progressing  3/21/2025 1433 by Luigi Davison RN  Outcome: Progressing     Problem: Hematologic - Adult  Goal: Maintains hematologic stability  3/21/2025 1433 by Luigi Davison RN  Outcome: Progressing     Problem: Physical Therapy - Adult  Goal: By Discharge: Performs mobility at highest level of function for planned discharge setting.  See evaluation for individualized goals.  Description: FUNCTIONAL STATUS PRIOR TO ADMISSION: Pt poor hx.  Stated she uses device but unclear.  Was using SPC when admitted 8/2024.    HOME SUPPORT PRIOR TO ADMISSION: The  Monitor for areas of redness and/or skin breakdown  2.  Assess vascular access sites hourly  3.  Every 4-6 hours minimum:  Change oxygen saturation probe site  4.  Every 4-6 hours:  If on nasal continuous positive airway pressure, respiratory therapy assess nares and determine need for appliance change or resting period  3/21/2025 2324 by Katy Caballero RN  Outcome: Progressing  3/21/2025 1433 by Luigi Davison RN  Outcome: Progressing     Problem: Neurosensory - Adult  Goal: Achieves stable or improved neurological status  3/21/2025 1433 by Luigi Davison RN  Outcome: Progressing     Problem: Skin/Tissue Integrity - Adult  Goal: Skin integrity remains intact  Description: 1.  Monitor for areas of redness and/or skin breakdown  2.  Assess vascular access sites hourly  3.  Every 4-6 hours minimum:  Change oxygen saturation probe site  4.  Every 4-6 hours:  If on nasal continuous positive airway pressure, respiratory therapy assess nares and determine need for appliance change or resting period  3/21/2025 2324 by Katy Caballero RN  Outcome: Progressing  3/21/2025 1433 by Luigi Davison RN  Outcome: Progressing     Problem: Gastrointestinal - Adult  Goal: Maintains adequate nutritional intake  3/21/2025 2324 by Katy Caballero RN  Outcome: Progressing  3/21/2025 1433 by Luigi Davison RN  Outcome: Progressing     Problem: Genitourinary - Adult  Goal: Absence of urinary retention  3/21/2025 2324 by Katy Caballero RN  Outcome: Progressing  3/21/2025 1433 by Luigi Davison RN  Outcome: Progressing  Goal: Urinary catheter remains patent  3/21/2025 1433 by Luigi Davison RN  Outcome: Progressing     Problem: Pain  Goal: Verbalizes/displays adequate comfort level or baseline comfort level  3/21/2025 1433 by Luigi Davison RN  Outcome: Progressing     Problem: Nutrition Deficit:  Goal: Optimize nutritional status  3/21/2025 1433 by Luigi Davison RN  Outcome: Progressing

## 2025-03-22 NOTE — PLAN OF CARE
Problem: Discharge Planning  Goal: Discharge to home or other facility with appropriate resources  Outcome: Progressing     Problem: Safety - Adult  Goal: Free from fall injury  Outcome: Progressing     Problem: Respiratory - Adult  Goal: Achieves optimal ventilation and oxygenation  Outcome: Progressing     Problem: Cardiovascular - Adult  Goal: Maintains optimal cardiac output and hemodynamic stability  Outcome: Progressing  Goal: Absence of cardiac dysrhythmias or at baseline  Outcome: Progressing     Problem: Musculoskeletal - Adult  Goal: Return mobility to safest level of function  Outcome: Progressing  Goal: Maintain proper alignment of affected body part  Outcome: Progressing  Goal: Return ADL status to a safe level of function  Outcome: Progressing     Problem: Hematologic - Adult  Goal: Maintains hematologic stability  Outcome: Progressing     Problem: Neurosensory - Adult  Goal: Achieves stable or improved neurological status  Outcome: Progressing     Problem: Skin/Tissue Integrity - Adult  Goal: Skin integrity remains intact  Description: 1.  Monitor for areas of redness and/or skin breakdown  2.  Assess vascular access sites hourly  3.  Every 4-6 hours minimum:  Change oxygen saturation probe site  4.  Every 4-6 hours:  If on nasal continuous positive airway pressure, respiratory therapy assess nares and determine need for appliance change or resting period  Outcome: Progressing     Problem: Genitourinary - Adult  Goal: Absence of urinary retention  Outcome: Progressing  Goal: Urinary catheter remains patent  Outcome: Progressing     Problem: Skin/Tissue Integrity  Goal: Skin integrity remains intact  Description: 1.  Monitor for areas of redness and/or skin breakdown  2.  Assess vascular access sites hourly  3.  Every 4-6 hours minimum:  Change oxygen saturation probe site  4.  Every 4-6 hours:  If on nasal continuous positive airway pressure, respiratory therapy assess nares and determine need for  appliance change or resting period  Outcome: Progressing     Problem: Pain  Goal: Verbalizes/displays adequate comfort level or baseline comfort level  Outcome: Progressing     Problem: Nutrition Deficit:  Goal: Optimize nutritional status  Outcome: Progressing

## 2025-03-22 NOTE — PROGRESS NOTES
SBAR report called to [facility]. Spoke to CRISTÓBAL Samuels. Opportunity for questions and clarification was provided. Pt left with vasquez clean and intact, and draining. attached a leg bag for dressing and ease of mobility. Educated pt on the use and emptying the leg bag. Pt verbalized understanding and demonstrated.

## 2025-04-02 ENCOUNTER — CARE COORDINATION (OUTPATIENT)
Dept: CASE MANAGEMENT | Age: 80
End: 2025-04-02

## 2025-04-02 NOTE — CARE COORDINATION
Care Transitions Note    Initial Call - Call within 2 business days of discharge: Yes    Attempted to reach patient, family, Lashirl  for transitions of care follow up. Unable to reach patient, family, Lashirl .    Outreach Attempts:   HIPAA compliant voicemail left for patient, family, Lashirl.     Patient: Zeenat Rolle    Patient : 1945   MRN: [id number]    Reason for Admission: brent cardia with PPM dual plced.   Discharge Date: 3/22/25  RURS: Readmission Risk Score: 14.6    Last Discharge Facility       Date Complaint Diagnosis Description Type Department Provider    3/11/25 Fatigue Other fatigue ... ED to Hosp-Admission (Discharged) (ADMITTED) [id number] Person MD; Jonh Denis...            Was this an external facility discharge? Yes. Discharge Date: 3/28. Facility Name:   [facility] to home    Follow Up Appointment:   Patient does not have a follow up appointment scheduled at time of call.  UTR  Future Appointments         Provider Specialty Dept Phone    2025 9:40 AM Lefty Segura MD Family Medicine [phone]            Plan for follow-up call in 2-5 days     BASIA Hooper, RN   Post Acute Care Transition Nurse  [facility]/ [facility]   [phone]

## 2025-04-03 ENCOUNTER — CARE COORDINATION (OUTPATIENT)
Dept: CASE MANAGEMENT | Age: 80
End: 2025-04-03

## 2025-04-03 DIAGNOSIS — R00.1 BRADYCARDIA: Primary | ICD-10-CM

## 2025-04-03 PROCEDURE — 1111F DSCHRG MED/CURRENT MED MERGE: CPT | Performed by: FAMILY MEDICINE

## 2025-04-03 NOTE — CARE COORDINATION
patient? Yes.   Reviewed appropriate site of care based on symptoms and resources available to patient including: PCP  Specialist. The family agrees to contact the primary care provider and/or specialist office for questions related to their healthcare.      Advance Care Planning:   Does patient have an Advance Directive: reviewed and needs to be updated.    Medication Reconciliation:  Medication reconciliation was performed with family,1111F entered: yes.       Assessments: Discharge Assessment  Care Transitions 24 Hour Call    Care Transitions Interventions          Follow Up Appointment:   Discussed follow up appointments. Patient has hospital follow up appointment scheduled within 7 days of discharge. Cardiology on 4/8  Future Appointments         Provider Specialty Dept Phone    5/12/2025 9:40 AM Lefty Segura MD Family Medicine [phone]            Post Acute Care Manager provided contact information.   Pool to decide  based on severity of symptoms and risk factors.  Plan for next call:  Pool to decide        Paige Ramirez, VIKKIN, RN   Post Acute Care Transition Nurse  [facility]/ [facility]   [phone]

## 2025-04-23 NOTE — PROGRESS NOTES
Physician Progress Note      PATIENT:               NEIL BETANCOURT  [facility] #:                  [id number]  :                       1945  ADMIT DATE:       3/11/2025 7:01 PM  DISCH DATE:        3/22/2025 3:04 PM  RESPONDING  PROVIDER #:        Trista Cordova MD          QUERY TEXT:    Please clarify the patient?s nutritional status: Comprehensive Nutrition   Assessment    Malnutrition Assessment:  Malnutrition Status:  Moderate malnutrition (25 1345)  Context:  Social/Environmental Circumstances  Findings of the 6 clinical characteristics of malnutrition:  Energy Intake:  Less than 75% estimated energy requirements for 3 months or   longer  Weight Loss:  Mild weight loss  Body Fat Loss:  Mild body fat loss Orbital, Triceps, Fat Overlying Ribs  Muscle Mass Loss:  Severe muscle mass loss Clavicles (pectoralis & deltoids),   Temples (temporalis), Hand (interosseous)  Fluid Accumulation:  No fluid accumulation   Strength:  Not Performed    Nutrition Recommendations/Plan:  Continue current diet  Trying Nepro shakes BID  Please document % meals and supplements consumed in flowsheet I/O's under   intake    The clinical indicators include:  2025 12:00:00 AM  Options provided:  -- Moderate malnutrition  -- Other - I will add my own diagnosis  -- Disagree - Not applicable / Not valid  -- Disagree - Clinically unable to determine / Unknown  -- Refer to Clinical Documentation Reviewer    PROVIDER RESPONSE TEXT:    This patient has moderate malnutrition.    Query created by: Edda Marie on 2025 3:02 PM      Electronically signed by:  Trista Cordova MD 2025 7:44 AM

## 2025-05-12 ENCOUNTER — OFFICE VISIT (OUTPATIENT)
Age: 80
End: 2025-05-12
Payer: MEDICARE

## 2025-05-12 VITALS
RESPIRATION RATE: 16 BRPM | WEIGHT: 115 LBS | SYSTOLIC BLOOD PRESSURE: 100 MMHG | HEIGHT: 63 IN | TEMPERATURE: 97.8 F | BODY MASS INDEX: 20.38 KG/M2 | OXYGEN SATURATION: 96 % | DIASTOLIC BLOOD PRESSURE: 63 MMHG | HEART RATE: 53 BPM

## 2025-05-12 DIAGNOSIS — E78.00 PURE HYPERCHOLESTEROLEMIA, UNSPECIFIED: ICD-10-CM

## 2025-05-12 DIAGNOSIS — Z12.31 ENCOUNTER FOR SCREENING MAMMOGRAM FOR MALIGNANT NEOPLASM OF BREAST: ICD-10-CM

## 2025-05-12 DIAGNOSIS — E87.5 HYPERKALEMIA: ICD-10-CM

## 2025-05-12 DIAGNOSIS — N18.31 STAGE 3A CHRONIC KIDNEY DISEASE (HCC): ICD-10-CM

## 2025-05-12 DIAGNOSIS — D64.9 ANEMIA, UNSPECIFIED TYPE: ICD-10-CM

## 2025-05-12 DIAGNOSIS — Z00.00 ENCOUNTER FOR MEDICARE ANNUAL WELLNESS EXAM: Primary | ICD-10-CM

## 2025-05-12 LAB
ANION GAP SERPL CALC-SCNC: 1 MMOL/L (ref 2–12)
BASOPHILS # BLD: 0.03 K/UL (ref 0–0.1)
BASOPHILS NFR BLD: 0.6 % (ref 0–1)
BUN SERPL-MCNC: 30 MG/DL (ref 6–20)
BUN/CREAT SERPL: 27 (ref 12–20)
CALCIUM SERPL-MCNC: 9.3 MG/DL (ref 8.5–10.1)
CHLORIDE SERPL-SCNC: 112 MMOL/L (ref 97–108)
CHOLEST SERPL-MCNC: 153 MG/DL
CO2 SERPL-SCNC: 28 MMOL/L (ref 21–32)
CREAT SERPL-MCNC: 1.1 MG/DL (ref 0.55–1.02)
DIFFERENTIAL METHOD BLD: ABNORMAL
EOSINOPHIL # BLD: 0.1 K/UL (ref 0–0.4)
EOSINOPHIL NFR BLD: 2 % (ref 0–7)
ERYTHROCYTE [DISTWIDTH] IN BLOOD BY AUTOMATED COUNT: 16.8 % (ref 11.5–14.5)
GLUCOSE SERPL-MCNC: 60 MG/DL (ref 65–100)
HCT VFR BLD AUTO: 27.7 % (ref 35–47)
HDLC SERPL-MCNC: 65 MG/DL
HDLC SERPL: 2.4 (ref 0–5)
HGB BLD-MCNC: 8.8 G/DL (ref 11.5–16)
IMM GRANULOCYTES # BLD AUTO: 0.02 K/UL (ref 0–0.04)
IMM GRANULOCYTES NFR BLD AUTO: 0.4 % (ref 0–0.5)
LDLC SERPL CALC-MCNC: 79.6 MG/DL (ref 0–100)
LYMPHOCYTES # BLD: 1.61 K/UL (ref 0.8–3.5)
LYMPHOCYTES NFR BLD: 31.9 % (ref 12–49)
MAGNESIUM SERPL-MCNC: 1.8 MG/DL (ref 1.6–2.4)
MCH RBC QN AUTO: 30.2 PG (ref 26–34)
MCHC RBC AUTO-ENTMCNC: 31.8 G/DL (ref 30–36.5)
MCV RBC AUTO: 95.2 FL (ref 80–99)
MONOCYTES # BLD: 0.42 K/UL (ref 0–1)
MONOCYTES NFR BLD: 8.3 % (ref 5–13)
NEUTS SEG # BLD: 2.87 K/UL (ref 1.8–8)
NEUTS SEG NFR BLD: 56.8 % (ref 32–75)
NRBC # BLD: 0 K/UL (ref 0–0.01)
NRBC BLD-RTO: 0 PER 100 WBC
PLATELET # BLD AUTO: 263 K/UL (ref 150–400)
PMV BLD AUTO: 11.9 FL (ref 8.9–12.9)
POTASSIUM SERPL-SCNC: 4.6 MMOL/L (ref 3.5–5.1)
RBC # BLD AUTO: 2.91 M/UL (ref 3.8–5.2)
SODIUM SERPL-SCNC: 141 MMOL/L (ref 136–145)
TRIGL SERPL-MCNC: 42 MG/DL
VLDLC SERPL CALC-MCNC: 8.4 MG/DL
WBC # BLD AUTO: 5.1 K/UL (ref 3.6–11)

## 2025-05-12 PROCEDURE — G8427 DOCREV CUR MEDS BY ELIG CLIN: HCPCS | Performed by: FAMILY MEDICINE

## 2025-05-12 PROCEDURE — G8420 CALC BMI NORM PARAMETERS: HCPCS | Performed by: FAMILY MEDICINE

## 2025-05-12 PROCEDURE — 99213 OFFICE O/P EST LOW 20 MIN: CPT | Performed by: FAMILY MEDICINE

## 2025-05-12 PROCEDURE — 1036F TOBACCO NON-USER: CPT | Performed by: FAMILY MEDICINE

## 2025-05-12 PROCEDURE — 1090F PRES/ABSN URINE INCON ASSESS: CPT | Performed by: FAMILY MEDICINE

## 2025-05-12 PROCEDURE — 1159F MED LIST DOCD IN RCRD: CPT | Performed by: FAMILY MEDICINE

## 2025-05-12 PROCEDURE — G8399 PT W/DXA RESULTS DOCUMENT: HCPCS | Performed by: FAMILY MEDICINE

## 2025-05-12 PROCEDURE — G0439 PPPS, SUBSEQ VISIT: HCPCS | Performed by: FAMILY MEDICINE

## 2025-05-12 PROCEDURE — 1123F ACP DISCUSS/DSCN MKR DOCD: CPT | Performed by: FAMILY MEDICINE

## 2025-05-12 RX ORDER — PATIROMER 8.4 G/1
8.4 POWDER, FOR SUSPENSION ORAL
COMMUNITY

## 2025-05-12 RX ORDER — LORATADINE 10 MG/1
10 TABLET ORAL DAILY PRN
COMMUNITY

## 2025-05-12 ASSESSMENT — LIFESTYLE VARIABLES
HOW MANY STANDARD DRINKS CONTAINING ALCOHOL DO YOU HAVE ON A TYPICAL DAY: PATIENT DOES NOT DRINK
HOW OFTEN DO YOU HAVE A DRINK CONTAINING ALCOHOL: NEVER

## 2025-05-12 ASSESSMENT — PATIENT HEALTH QUESTIONNAIRE - PHQ9
SUM OF ALL RESPONSES TO PHQ QUESTIONS 1-9: 0
1. LITTLE INTEREST OR PLEASURE IN DOING THINGS: NOT AT ALL
SUM OF ALL RESPONSES TO PHQ QUESTIONS 1-9: 0
SUM OF ALL RESPONSES TO PHQ QUESTIONS 1-9: 0
2. FEELING DOWN, DEPRESSED OR HOPELESS: NOT AT ALL
SUM OF ALL RESPONSES TO PHQ QUESTIONS 1-9: 0

## 2025-05-12 NOTE — PROGRESS NOTES
Zeenat Rolle (:  1945) is a 79 y.o. female,Established patient, here for evaluation of the following chief complaint(s):  Medicare AWV         Assessment & Plan  Encounter for screening mammogram for malignant neoplasm of breast       Orders:    AZEEM DIGITAL SCREEN W OR WO CAD BILATERAL; Future    Stage 3a chronic kidney disease (HCC)    Continue followup with Dr. Edna Lang.          Hyperkalemia    Now on veltassa as per nephrology.     Orders:    Basic Metabolic Panel; Future    Magnesium; Future    Anemia, unspecified type       Orders:    CBC with Auto Differential; Future    Pure hypercholesterolemia, unspecified       Orders:    Lipid Panel; Future    Encounter for Medicare annual wellness exam              No follow-ups on file.       Subjective   HPI ws hospitalized in March with UTI, anemia, hyperkalemia, bradycardia. No dizziness. Anorexia, abdominal pain. No syncope. No complaints of chest pain, shortness of breath, TIAs, claudication or edema. No dysuria, frequency, hematuria.   Needs medicare wellness exam. Sees Dr. Edna Lang, Dr. Mackenzie (cardiology). Due for mammogram. Declines colonoscopy.  Would want daughter to be her mP{OA if she became incapacitated.       Review of Systems   HENT:  Negative for hearing loss.    Neurological:         No falls, canes. Independent in adls. Memory fair.    Psychiatric/Behavioral:          Not depressed. No alcohol          Objective   Physical Exam  Cardiovascular:      Rate and Rhythm: Normal rate and regular rhythm.      Heart sounds: Normal heart sounds. No murmur heard.  Pulmonary:      Effort: Pulmonary effort is normal.      Breath sounds: Normal breath sounds.   Abdominal:      General: Abdomen is flat. Bowel sounds are normal.      Palpations: Abdomen is soft.   Musculoskeletal:      Right lower leg: No edema.      Left lower leg: No edema.                  An electronic signature was used to authenticate this note.    --Lefty Segura MD

## 2025-05-12 NOTE — PROGRESS NOTES
Chief Complaint   Patient presents with    Medicare AWV       \"Have you been to the ER, urgent care clinic since your last visit?  Hospitalized since your last visit?\"      3/11/25 - 3/22/25 - [facility] - ADMISSION    “Have you seen or consulted any other health care providers outside of [facility] since your last visit?”      25  - NEPHROLOGY - DR. ISAIAS ESPARZA            Click Here for Release of Records Request       There were no vitals filed for this visit.   Health Maintenance Due   Topic Date Due    Shingles vaccine (1 of 2) Never done    Respiratory Syncytial Virus (RSV) Pregnant or age 60 yrs+ (1 - 1-dose 75+ series) Never done    DTaP/Tdap/Td vaccine (2 - Td or Tdap) 2024    COVID-19 Vaccine (2024- season) 2024        The patient, Zeenat Rolle, identity was verified by name and .

## 2025-05-21 ENCOUNTER — RESULTS FOLLOW-UP (OUTPATIENT)
Age: 80
End: 2025-05-21

## 2025-05-21 NOTE — TELEPHONE ENCOUNTER
Pc with pt. Recommended seeing hematology for anemia, however, she is reluctant nto do thsi at the present time. Potassium level was good.

## 2025-06-09 RX ORDER — ATORVASTATIN CALCIUM 40 MG/1
TABLET, FILM COATED ORAL
Qty: 90 TABLET | Refills: 3 | Status: SHIPPED | OUTPATIENT
Start: 2025-06-09

## 2025-07-03 ENCOUNTER — HOSPITAL ENCOUNTER (OUTPATIENT)
Facility: HOSPITAL | Age: 80
Discharge: HOME | End: 2025-07-03
Attending: FAMILY MEDICINE
Payer: MEDICARE

## 2025-07-03 VITALS — BODY MASS INDEX: 19.49 KG/M2 | WEIGHT: 110 LBS | HEIGHT: 63 IN

## 2025-07-03 DIAGNOSIS — Z12.31 ENCOUNTER FOR SCREENING MAMMOGRAM FOR MALIGNANT NEOPLASM OF BREAST: ICD-10-CM

## 2025-07-03 PROCEDURE — 77063 BREAST TOMOSYNTHESIS BI: CPT

## (undated) DEVICE — Device: Brand: MEDEX

## (undated) DEVICE — KENDALL RADIOLUCENT FOAM MONITORING ELECTRODE RECTANGULAR SHAPE: Brand: KENDALL

## (undated) DEVICE — NEEDLE HYPO 18GA L1.5IN PNK S STL HUB POLYPR SHLD REG BVL

## (undated) DEVICE — SYRINGE 50ML E/T

## (undated) DEVICE — CATH IV AUTOGRD BC PNK 20GA 25 -- INSYTE

## (undated) DEVICE — ESOPHAGEAL BALLOON DILATATION CATHETER: Brand: CRE FIXED WIRE

## (undated) DEVICE — SOLIDIFIER MEDC 1200ML -- CONVERT TO 356117

## (undated) DEVICE — BASIN EMESIS 500CC ROSE 250/CS 60/PLT: Brand: MEDEGEN MEDICAL PRODUCTS, LLC

## (undated) DEVICE — BLOCK BITE ENDOSCP AD 21 MM W/ DIL BLU LF DISP

## (undated) DEVICE — SET ADMIN 16ML TBNG L100IN 2 Y INJ SITE IV PIGGY BK DISP

## (undated) DEVICE — SYR ASSEMB INFL BLLN 60ML --

## (undated) DEVICE — NEONATAL-ADULT SPO2 SENSOR: Brand: NELLCOR

## (undated) DEVICE — Z DISCONTINUED PER MEDLINE LINE GAS SAMPLING O2/CO2 LNG AD 13 FT NSL W/ TBNG FILTERLINE

## (undated) DEVICE — Device

## (undated) DEVICE — MEDI-VAC YANK SUCT HNDL W/TPRD BULBOUS TIP: Brand: CARDINAL HEALTH

## (undated) DEVICE — TOWEL 4 PLY TISS 19X30 SUE WHT

## (undated) DEVICE — SYR 3ML LL TIP 1/10ML GRAD --

## (undated) DEVICE — 1200 GUARD II KIT W/5MM TUBE W/O VAC TUBE: Brand: GUARDIAN

## (undated) DEVICE — (D)SYR 10ML 1/5ML GRAD NSAF -- PKGING CHANGE USE ITEM 338027